# Patient Record
Sex: FEMALE | Race: WHITE | NOT HISPANIC OR LATINO | Employment: OTHER | URBAN - METROPOLITAN AREA
[De-identification: names, ages, dates, MRNs, and addresses within clinical notes are randomized per-mention and may not be internally consistent; named-entity substitution may affect disease eponyms.]

---

## 2022-03-02 ENCOUNTER — APPOINTMENT (OUTPATIENT)
Dept: GENERAL RADIOLOGY | Facility: HOSPITAL | Age: 62
End: 2022-03-02

## 2022-03-02 ENCOUNTER — APPOINTMENT (OUTPATIENT)
Dept: MRI IMAGING | Facility: HOSPITAL | Age: 62
End: 2022-03-02

## 2022-03-02 ENCOUNTER — APPOINTMENT (OUTPATIENT)
Dept: CT IMAGING | Facility: HOSPITAL | Age: 62
End: 2022-03-02

## 2022-03-02 ENCOUNTER — HOSPITAL ENCOUNTER (INPATIENT)
Facility: HOSPITAL | Age: 62
LOS: 6 days | Discharge: NURSING, REHAB OR LONG-TERM CARE | End: 2022-03-08
Attending: EMERGENCY MEDICINE | Admitting: INTERNAL MEDICINE

## 2022-03-02 DIAGNOSIS — R47.01 EXPRESSIVE APHASIA: ICD-10-CM

## 2022-03-02 DIAGNOSIS — I69.392: ICD-10-CM

## 2022-03-02 DIAGNOSIS — I63.9 ACUTE ISCHEMIC STROKE: ICD-10-CM

## 2022-03-02 DIAGNOSIS — R44.9 RIGHT-SIDED SENSORY DEFICIT PRESENT: ICD-10-CM

## 2022-03-02 DIAGNOSIS — R13.12 OROPHARYNGEAL DYSPHAGIA: Primary | ICD-10-CM

## 2022-03-02 PROBLEM — F32.A DEPRESSION: Status: ACTIVE | Noted: 2022-03-02

## 2022-03-02 PROBLEM — G40.909 SEIZURE DISORDER: Chronic | Status: ACTIVE | Noted: 2022-03-02

## 2022-03-02 PROBLEM — E78.5 DYSLIPIDEMIA: Chronic | Status: ACTIVE | Noted: 2022-03-02

## 2022-03-02 PROBLEM — F32.A DEPRESSION: Chronic | Status: ACTIVE | Noted: 2022-03-02

## 2022-03-02 PROBLEM — I10 HYPERTENSION: Status: ACTIVE | Noted: 2022-03-02

## 2022-03-02 PROBLEM — E11.9 TYPE 2 DIABETES MELLITUS: Status: ACTIVE | Noted: 2022-03-02

## 2022-03-02 PROBLEM — E11.9 TYPE 2 DIABETES MELLITUS: Chronic | Status: ACTIVE | Noted: 2022-03-02

## 2022-03-02 PROBLEM — E78.5 DYSLIPIDEMIA: Status: ACTIVE | Noted: 2022-03-02

## 2022-03-02 PROBLEM — I10 HYPERTENSION: Chronic | Status: ACTIVE | Noted: 2022-03-02

## 2022-03-02 PROBLEM — G40.909 SEIZURE DISORDER: Status: ACTIVE | Noted: 2022-03-02

## 2022-03-02 LAB
AMPHET+METHAMPHET UR QL: NEGATIVE
AMPHETAMINES UR QL: NEGATIVE
ANION GAP SERPL CALCULATED.3IONS-SCNC: 14 MMOL/L (ref 5–15)
APTT PPP: 30.7 SECONDS (ref 22–39)
BARBITURATES UR QL SCN: NEGATIVE
BASOPHILS # BLD AUTO: 0.04 10*3/MM3 (ref 0–0.2)
BASOPHILS # BLD AUTO: 0.07 10*3/MM3 (ref 0–0.2)
BASOPHILS NFR BLD AUTO: 0.4 % (ref 0–1.5)
BASOPHILS NFR BLD AUTO: 1.1 % (ref 0–1.5)
BENZODIAZ UR QL SCN: NEGATIVE
BUN SERPL-MCNC: 9 MG/DL (ref 8–23)
BUN/CREAT SERPL: 14.3 (ref 7–25)
BUPRENORPHINE SERPL-MCNC: NEGATIVE NG/ML
CALCIUM SPEC-SCNC: 9.8 MG/DL (ref 8.6–10.5)
CANNABINOIDS SERPL QL: NEGATIVE
CHLORIDE SERPL-SCNC: 98 MMOL/L (ref 98–107)
CO2 SERPL-SCNC: 23 MMOL/L (ref 22–29)
COCAINE UR QL: NEGATIVE
CREAT BLDA-MCNC: 0.5 MG/DL (ref 0.6–1.3)
CREAT SERPL-MCNC: 0.63 MG/DL (ref 0.57–1)
DEPRECATED RDW RBC AUTO: 40.3 FL (ref 37–54)
DEPRECATED RDW RBC AUTO: 40.6 FL (ref 37–54)
EGFRCR SERPLBLD CKD-EPI 2021: 100.4 ML/MIN/1.73
EOSINOPHIL # BLD AUTO: 0.05 10*3/MM3 (ref 0–0.4)
EOSINOPHIL # BLD AUTO: 0.06 10*3/MM3 (ref 0–0.4)
EOSINOPHIL NFR BLD AUTO: 0.6 % (ref 0.3–6.2)
EOSINOPHIL NFR BLD AUTO: 1 % (ref 0.3–6.2)
ERYTHROCYTE [DISTWIDTH] IN BLOOD BY AUTOMATED COUNT: 12 % (ref 12.3–15.4)
ERYTHROCYTE [DISTWIDTH] IN BLOOD BY AUTOMATED COUNT: 12.2 % (ref 12.3–15.4)
GLUCOSE BLDC GLUCOMTR-MCNC: 197 MG/DL (ref 70–130)
GLUCOSE BLDC GLUCOMTR-MCNC: 226 MG/DL (ref 70–130)
GLUCOSE SERPL-MCNC: 249 MG/DL (ref 65–99)
HCT VFR BLD AUTO: 41.1 % (ref 34–46.6)
HCT VFR BLD AUTO: 43.9 % (ref 34–46.6)
HGB BLD-MCNC: 14 G/DL (ref 12–15.9)
HGB BLD-MCNC: 14.9 G/DL (ref 12–15.9)
HOLD SPECIMEN: NORMAL
IMM GRANULOCYTES # BLD AUTO: 0.03 10*3/MM3 (ref 0–0.05)
IMM GRANULOCYTES # BLD AUTO: 0.03 10*3/MM3 (ref 0–0.05)
IMM GRANULOCYTES NFR BLD AUTO: 0.3 % (ref 0–0.5)
IMM GRANULOCYTES NFR BLD AUTO: 0.5 % (ref 0–0.5)
INR PPP: 0.9 (ref 0.8–1.2)
LYMPHOCYTES # BLD AUTO: 1.7 10*3/MM3 (ref 0.7–3.1)
LYMPHOCYTES # BLD AUTO: 1.78 10*3/MM3 (ref 0.7–3.1)
LYMPHOCYTES NFR BLD AUTO: 18.9 % (ref 19.6–45.3)
LYMPHOCYTES NFR BLD AUTO: 28.6 % (ref 19.6–45.3)
MCH RBC QN AUTO: 31 PG (ref 26.6–33)
MCH RBC QN AUTO: 31.2 PG (ref 26.6–33)
MCHC RBC AUTO-ENTMCNC: 33.9 G/DL (ref 31.5–35.7)
MCHC RBC AUTO-ENTMCNC: 34.1 G/DL (ref 31.5–35.7)
MCV RBC AUTO: 90.9 FL (ref 79–97)
MCV RBC AUTO: 92 FL (ref 79–97)
METHADONE UR QL SCN: NEGATIVE
MONOCYTES # BLD AUTO: 0.41 10*3/MM3 (ref 0.1–0.9)
MONOCYTES # BLD AUTO: 0.42 10*3/MM3 (ref 0.1–0.9)
MONOCYTES NFR BLD AUTO: 4.6 % (ref 5–12)
MONOCYTES NFR BLD AUTO: 6.8 % (ref 5–12)
NEUTROPHILS NFR BLD AUTO: 3.86 10*3/MM3 (ref 1.7–7)
NEUTROPHILS NFR BLD AUTO: 6.77 10*3/MM3 (ref 1.7–7)
NEUTROPHILS NFR BLD AUTO: 62 % (ref 42.7–76)
NEUTROPHILS NFR BLD AUTO: 75.2 % (ref 42.7–76)
NRBC BLD AUTO-RTO: 0 /100 WBC (ref 0–0.2)
NRBC BLD AUTO-RTO: 0 /100 WBC (ref 0–0.2)
OPIATES UR QL: NEGATIVE
OXYCODONE UR QL SCN: NEGATIVE
PCP UR QL SCN: NEGATIVE
PLATELET # BLD AUTO: 255 10*3/MM3 (ref 140–450)
PLATELET # BLD AUTO: 262 10*3/MM3 (ref 140–450)
PMV BLD AUTO: 10.1 FL (ref 6–12)
PMV BLD AUTO: 9.9 FL (ref 6–12)
POTASSIUM SERPL-SCNC: 4.2 MMOL/L (ref 3.5–5.2)
PROPOXYPH UR QL: NEGATIVE
PROTHROMBIN TIME: 11.4 SECONDS (ref 12.8–15.2)
RBC # BLD AUTO: 4.52 10*6/MM3 (ref 3.77–5.28)
RBC # BLD AUTO: 4.77 10*6/MM3 (ref 3.77–5.28)
SARS-COV-2 RDRP RESP QL NAA+PROBE: NORMAL
SODIUM SERPL-SCNC: 135 MMOL/L (ref 136–145)
TRICYCLICS UR QL SCN: NEGATIVE
TROPONIN T SERPL-MCNC: <0.01 NG/ML (ref 0–0.03)
WBC NRBC COR # BLD: 6.22 10*3/MM3 (ref 3.4–10.8)
WBC NRBC COR # BLD: 9 10*3/MM3 (ref 3.4–10.8)
WHOLE BLOOD HOLD SPECIMEN: NORMAL
WHOLE BLOOD HOLD SPECIMEN: NORMAL

## 2022-03-02 PROCEDURE — 61645 PERQ ART M-THROMBECT &/NFS: CPT | Performed by: STUDENT IN AN ORGANIZED HEALTH CARE EDUCATION/TRAINING PROGRAM

## 2022-03-02 PROCEDURE — 85610 PROTHROMBIN TIME: CPT

## 2022-03-02 PROCEDURE — 70496 CT ANGIOGRAPHY HEAD: CPT

## 2022-03-02 PROCEDURE — 70450 CT HEAD/BRAIN W/O DYE: CPT

## 2022-03-02 PROCEDURE — 93005 ELECTROCARDIOGRAM TRACING: CPT | Performed by: EMERGENCY MEDICINE

## 2022-03-02 PROCEDURE — 0042T HC CT CEREBRAL PERFUSION W/WO CONTRAST: CPT

## 2022-03-02 PROCEDURE — 25010000002 HEPARIN (PORCINE) 25000-0.45 UT/250ML-% SOLUTION: Performed by: NURSE PRACTITIONER

## 2022-03-02 PROCEDURE — C1773 RET DEV, INSERTABLE: HCPCS | Performed by: STUDENT IN AN ORGANIZED HEALTH CARE EDUCATION/TRAINING PROGRAM

## 2022-03-02 PROCEDURE — 25010000002 MIDAZOLAM PER 1 MG: Performed by: STUDENT IN AN ORGANIZED HEALTH CARE EDUCATION/TRAINING PROGRAM

## 2022-03-02 PROCEDURE — 99284 EMERGENCY DEPT VISIT MOD MDM: CPT

## 2022-03-02 PROCEDURE — 87635 SARS-COV-2 COVID-19 AMP PRB: CPT | Performed by: EMERGENCY MEDICINE

## 2022-03-02 PROCEDURE — C1769 GUIDE WIRE: HCPCS | Performed by: STUDENT IN AN ORGANIZED HEALTH CARE EDUCATION/TRAINING PROGRAM

## 2022-03-02 PROCEDURE — C1887 CATHETER, GUIDING: HCPCS | Performed by: STUDENT IN AN ORGANIZED HEALTH CARE EDUCATION/TRAINING PROGRAM

## 2022-03-02 PROCEDURE — 74018 RADEX ABDOMEN 1 VIEW: CPT

## 2022-03-02 PROCEDURE — 0 IOPAMIDOL PER 1 ML: Performed by: EMERGENCY MEDICINE

## 2022-03-02 PROCEDURE — B31R1ZZ FLUOROSCOPY OF INTRACRANIAL ARTERIES USING LOW OSMOLAR CONTRAST: ICD-10-PCS | Performed by: STUDENT IN AN ORGANIZED HEALTH CARE EDUCATION/TRAINING PROGRAM

## 2022-03-02 PROCEDURE — 82565 ASSAY OF CREATININE: CPT

## 2022-03-02 PROCEDURE — 80048 BASIC METABOLIC PNL TOTAL CA: CPT | Performed by: NURSE PRACTITIONER

## 2022-03-02 PROCEDURE — 0 IODIXANOL PER 1 ML: Performed by: STUDENT IN AN ORGANIZED HEALTH CARE EDUCATION/TRAINING PROGRAM

## 2022-03-02 PROCEDURE — 70551 MRI BRAIN STEM W/O DYE: CPT

## 2022-03-02 PROCEDURE — 84484 ASSAY OF TROPONIN QUANT: CPT | Performed by: EMERGENCY MEDICINE

## 2022-03-02 PROCEDURE — 25010000002 HEPARIN (PORCINE) PER 1000 UNITS: Performed by: STUDENT IN AN ORGANIZED HEALTH CARE EDUCATION/TRAINING PROGRAM

## 2022-03-02 PROCEDURE — 82962 GLUCOSE BLOOD TEST: CPT

## 2022-03-02 PROCEDURE — 85025 COMPLETE CBC W/AUTO DIFF WBC: CPT | Performed by: NURSE PRACTITIONER

## 2022-03-02 PROCEDURE — 99152 MOD SED SAME PHYS/QHP 5/>YRS: CPT | Performed by: STUDENT IN AN ORGANIZED HEALTH CARE EDUCATION/TRAINING PROGRAM

## 2022-03-02 PROCEDURE — 99223 1ST HOSP IP/OBS HIGH 75: CPT | Performed by: NURSE PRACTITIONER

## 2022-03-02 PROCEDURE — 99223 1ST HOSP IP/OBS HIGH 75: CPT | Performed by: INTERNAL MEDICINE

## 2022-03-02 PROCEDURE — 63710000001 INSULIN REGULAR HUMAN PER 5 UNITS: Performed by: INTERNAL MEDICINE

## 2022-03-02 PROCEDURE — C1894 INTRO/SHEATH, NON-LASER: HCPCS | Performed by: STUDENT IN AN ORGANIZED HEALTH CARE EDUCATION/TRAINING PROGRAM

## 2022-03-02 PROCEDURE — 85730 THROMBOPLASTIN TIME PARTIAL: CPT | Performed by: EMERGENCY MEDICINE

## 2022-03-02 PROCEDURE — 85025 COMPLETE CBC W/AUTO DIFF WBC: CPT | Performed by: EMERGENCY MEDICINE

## 2022-03-02 PROCEDURE — 70498 CT ANGIOGRAPHY NECK: CPT

## 2022-03-02 PROCEDURE — 03CG3Z7 EXTIRPATION OF MATTER FROM INTRACRANIAL ARTERY USING STENT RETRIEVER, PERCUTANEOUS APPROACH: ICD-10-PCS | Performed by: STUDENT IN AN ORGANIZED HEALTH CARE EDUCATION/TRAINING PROGRAM

## 2022-03-02 PROCEDURE — C1787 PATIENT PROGR, NEUROSTIM: HCPCS | Performed by: STUDENT IN AN ORGANIZED HEALTH CARE EDUCATION/TRAINING PROGRAM

## 2022-03-02 PROCEDURE — 25010000002 FENTANYL CITRATE (PF) 50 MCG/ML SOLUTION: Performed by: STUDENT IN AN ORGANIZED HEALTH CARE EDUCATION/TRAINING PROGRAM

## 2022-03-02 PROCEDURE — 80306 DRUG TEST PRSMV INSTRMNT: CPT | Performed by: INTERNAL MEDICINE

## 2022-03-02 PROCEDURE — 99153 MOD SED SAME PHYS/QHP EA: CPT | Performed by: STUDENT IN AN ORGANIZED HEALTH CARE EDUCATION/TRAINING PROGRAM

## 2022-03-02 RX ORDER — OXCARBAZEPINE 150 MG/1
300 TABLET, FILM COATED ORAL 2 TIMES DAILY
Status: DISCONTINUED | OUTPATIENT
Start: 2022-03-02 | End: 2022-03-08 | Stop reason: HOSPADM

## 2022-03-02 RX ORDER — OMEPRAZOLE 20 MG/1
20 CAPSULE, DELAYED RELEASE ORAL DAILY
COMMUNITY
End: 2022-03-08 | Stop reason: HOSPADM

## 2022-03-02 RX ORDER — DEXTROSE MONOHYDRATE 25 G/50ML
25 INJECTION, SOLUTION INTRAVENOUS
Status: DISCONTINUED | OUTPATIENT
Start: 2022-03-02 | End: 2022-03-03

## 2022-03-02 RX ORDER — MIDAZOLAM HYDROCHLORIDE 1 MG/ML
INJECTION INTRAMUSCULAR; INTRAVENOUS AS NEEDED
Status: DISCONTINUED | OUTPATIENT
Start: 2022-03-02 | End: 2022-03-02 | Stop reason: HOSPADM

## 2022-03-02 RX ORDER — SODIUM CHLORIDE 0.9 % (FLUSH) 0.9 %
10 SYRINGE (ML) INJECTION EVERY 12 HOURS SCHEDULED
Status: DISCONTINUED | OUTPATIENT
Start: 2022-03-02 | End: 2022-03-08 | Stop reason: HOSPADM

## 2022-03-02 RX ORDER — FAMOTIDINE 20 MG/1
20 TABLET, FILM COATED ORAL 2 TIMES DAILY
COMMUNITY

## 2022-03-02 RX ORDER — SODIUM CHLORIDE 0.9 % (FLUSH) 0.9 %
10 SYRINGE (ML) INJECTION AS NEEDED
Status: DISCONTINUED | OUTPATIENT
Start: 2022-03-02 | End: 2022-03-08 | Stop reason: HOSPADM

## 2022-03-02 RX ORDER — ASPIRIN 300 MG/1
300 SUPPOSITORY RECTAL DAILY
Status: DISCONTINUED | OUTPATIENT
Start: 2022-03-02 | End: 2022-03-04

## 2022-03-02 RX ORDER — EZETIMIBE 10 MG/1
10 TABLET ORAL DAILY
COMMUNITY

## 2022-03-02 RX ORDER — LIDOCAINE HYDROCHLORIDE 10 MG/ML
INJECTION, SOLUTION EPIDURAL; INFILTRATION; INTRACAUDAL; PERINEURAL AS NEEDED
Status: DISCONTINUED | OUTPATIENT
Start: 2022-03-02 | End: 2022-03-02 | Stop reason: HOSPADM

## 2022-03-02 RX ORDER — NICOTINE POLACRILEX 4 MG
15 LOZENGE BUCCAL
Status: DISCONTINUED | OUTPATIENT
Start: 2022-03-02 | End: 2022-03-03

## 2022-03-02 RX ORDER — HEPARIN SODIUM 10000 [USP'U]/100ML
14 INJECTION, SOLUTION INTRAVENOUS
Status: DISCONTINUED | OUTPATIENT
Start: 2022-03-02 | End: 2022-03-03

## 2022-03-02 RX ORDER — FENTANYL CITRATE 50 UG/ML
INJECTION, SOLUTION INTRAMUSCULAR; INTRAVENOUS AS NEEDED
Status: DISCONTINUED | OUTPATIENT
Start: 2022-03-02 | End: 2022-03-02 | Stop reason: HOSPADM

## 2022-03-02 RX ORDER — ACETAMINOPHEN 325 MG/1
650 TABLET ORAL EVERY 6 HOURS PRN
Status: DISCONTINUED | OUTPATIENT
Start: 2022-03-02 | End: 2022-03-08 | Stop reason: HOSPADM

## 2022-03-02 RX ORDER — ATORVASTATIN CALCIUM 40 MG/1
80 TABLET, FILM COATED ORAL NIGHTLY
Status: DISCONTINUED | OUTPATIENT
Start: 2022-03-02 | End: 2022-03-08 | Stop reason: HOSPADM

## 2022-03-02 RX ORDER — IODIXANOL 320 MG/ML
INJECTION, SOLUTION INTRAVASCULAR AS NEEDED
Status: DISCONTINUED | OUTPATIENT
Start: 2022-03-02 | End: 2022-03-02 | Stop reason: HOSPADM

## 2022-03-02 RX ORDER — DULOXETIN HYDROCHLORIDE 60 MG/1
60 CAPSULE, DELAYED RELEASE ORAL DAILY
COMMUNITY

## 2022-03-02 RX ORDER — ATORVASTATIN CALCIUM 20 MG/1
20 TABLET, FILM COATED ORAL DAILY
COMMUNITY
End: 2022-03-08 | Stop reason: HOSPADM

## 2022-03-02 RX ORDER — VERAPAMIL HYDROCHLORIDE 2.5 MG/ML
INJECTION, SOLUTION INTRAVENOUS AS NEEDED
Status: DISCONTINUED | OUTPATIENT
Start: 2022-03-02 | End: 2022-03-02 | Stop reason: HOSPADM

## 2022-03-02 RX ORDER — ASPIRIN 81 MG/1
81 TABLET, CHEWABLE ORAL DAILY
Status: DISCONTINUED | OUTPATIENT
Start: 2022-03-02 | End: 2022-03-04

## 2022-03-02 RX ORDER — OXCARBAZEPINE 300 MG/1
300 TABLET, FILM COATED ORAL 2 TIMES DAILY
COMMUNITY

## 2022-03-02 RX ADMIN — ASPIRIN 300 MG: 300 SUPPOSITORY RECTAL at 18:13

## 2022-03-02 RX ADMIN — IOPAMIDOL 115 ML: 755 INJECTION, SOLUTION INTRAVENOUS at 13:57

## 2022-03-02 RX ADMIN — Medication 10 ML: at 20:35

## 2022-03-02 RX ADMIN — ATORVASTATIN CALCIUM 80 MG: 40 TABLET, FILM COATED ORAL at 23:52

## 2022-03-02 RX ADMIN — INSULIN HUMAN 3 UNITS: 100 INJECTION, SOLUTION PARENTERAL at 18:24

## 2022-03-02 RX ADMIN — ACETAMINOPHEN 650 MG: 325 TABLET, FILM COATED ORAL at 23:53

## 2022-03-02 RX ADMIN — HEPARIN SODIUM 12 UNITS/KG/HR: 10000 INJECTION, SOLUTION INTRAVENOUS at 18:13

## 2022-03-02 RX ADMIN — NICARDIPINE HYDROCHLORIDE 10 MG/HR: 25 INJECTION, SOLUTION INTRAVENOUS at 17:10

## 2022-03-02 NOTE — H&P
"      Chief complaint:  Aphasia and right facial numbness     Subjective     Maryana Luevano is a 61 y.o. female who presents to [facility] ED on 3/2/22 with complaints of ongoing speech difficulty and right facial numbness concerning for stroke.      The patient is unable to participate in a thorough medical history based upon her expressive aphasia.  Her past medical history includes HTN, dyslipidemia, T2DM, and possible seizures (Trileptal) and depression (Cymbalta). Yesterday the patient was evaluated by [facility] for evaluation of episodic aphasia. Evidently some form of cerebral imaging was obtained with results unknown though may have represented a \"remote left frontal parietal infarct\" based upon outside records obtained by the stroke team. Today the patient was brought to the ED with ongoing severe expressive aphasia, right-sided vision loss, and right facial numbness. She presented with NIH 8. CT head negative for acute process with prior left parietal lobes infarcts. CTP showed a perfusion deficit within the left temporal and left parietal lobes. CTA head/neck reveals a left M2 occlusion. EKG NSR and -200s.  Patient was evaluated by our stroke neurology services and deemed not a candidate for thrombolytic therapy as she was outside the timeline window, therefore she was taken to the cath lab for mechanical thrombectomy by Dr. Nelson.     She presents to the ICU post thrombectomy.  NIH currently scored as an 11.  She has expressive aphasia but does follow verbal commands quite readily.  She has no clear focal deficits from a motor standpoint other than the left hand but she still has the TR band in place after catheterization.     COVID-19 testing on 3/2 negative. She has completed the Moderna vaccine series in May 2021    History  Past Medical History:   Diagnosis Date   • Depression 3/2/2022   • Dyslipidemia 3/2/2022   • Hypertension 3/2/2022   • Seizure disorder (on Trileptal) 3/2/2022 " "  • T2DM  3/2/2022     No past surgical history on file.  History reviewed. No pertinent family history.  Social History     Tobacco Use   • Smoking status: Never Smoker   • Smokeless tobacco: Never Used   Substance Use Topics   • Alcohol use: Not on file   • Drug use: Not on file     E-cigarette/Vaping     E-cigarette/Vaping Substances     Medications Prior to Admission   Medication Sig Dispense Refill Last Dose   • DULoxetine (CYMBALTA) 60 MG capsule Take 60 mg by mouth Daily.   3/2/2022 at Unknown time   • metFORMIN (GLUCOPHAGE) 500 MG tablet Take 500 mg by mouth 2 (Two) Times a Day With Meals.   3/2/2022 at Unknown time   • OXcarbazepine (TRILEPTAL) 300 MG tablet Take 300 mg by mouth 2 (Two) Times a Day.   3/2/2022 at Unknown time   • atorvastatin (LIPITOR) 20 MG tablet Take 20 mg by mouth Daily. (Patient not taking: Reported on 3/2/2022)   Not Taking at Unknown time   • ezetimibe (Zetia) 10 MG tablet Take 10 mg by mouth Daily. (Patient not taking: Reported on 3/2/2022)   Not Taking at Unknown time   • famotidine (PEPCID) 20 MG tablet Take 20 mg by mouth 2 (Two) Times a Day. (Patient not taking: Reported on 3/2/2022)   Not Taking at Unknown time   • omeprazole (priLOSEC) 20 MG capsule Take 20 mg by mouth Daily. (Patient not taking: Reported on 3/2/2022)   Not Taking at Unknown time     Allergies:  Patient has no known allergies.    Review of Systems   Review of systems could not be obtained due to   patient nonverbal.      Objective     Vital Signs  Temp:  [97.7 °F (36.5 °C)-98 °F (36.7 °C)] 98 °F (36.7 °C)  Heart Rate:  [] 87  Resp:  [16-18] 16  BP: (117-201)/() 121/70    Physical Exam:    Objective:  General Appearance:  In no acute distress.    Vital signs: (most recent): Blood pressure 121/70, pulse 87, temperature 98 °F (36.7 °C), temperature source Oral, resp. rate 16, height 167.6 cm (66\"), weight 77.1 kg (170 lb), SpO2 95 %.    HEENT: Normal HEENT exam.    Lungs:  Normal effort and normal " respiratory rate.  Breath sounds clear to auscultation.  She is not in respiratory distress.  No rales, wheezes or rhonchi.    Heart: Normal rate.  Regular rhythm.  S1 normal and S2 normal.  No murmur, gallop or friction rub.   Chest: Symmetric chest wall expansion.   Abdomen: Abdomen is soft and non-distended.  Bowel sounds are normal.   There is no abdominal tenderness.   There is no mass. There is no splenomegaly. There is no hepatomegaly.   Extremities: There is no deformity or dependent edema.    Neurological: Patient is alert and oriented to person, place and time.    Pupils:  Pupils are equal, round, and reactive to light.    Skin:  Warm and dry.             1a. Level of Consciousness: 0-->Alert, keenly responsive  1b. LOC Questions: 2-->Answers neither question correctly  1c. LOC Commands: 0-->Performs both tasks correctly  2. Best Gaze: 0-->Normal  3. Visual: 1-->Partial hemianopia  4. Facial Palsy: 1-->Minor paralysis (flattened nasolabial fold, asymmetry on smiling)  5a. Motor Arm, Left: 1-->Drift, limb holds 90 (or 45) degrees, but drifts down before full 10 seconds, does not hit bed or other support  5b. Motor Arm, Right: 0-->No drift, limb holds 90 (or 45) degrees for full 10 secs  6a. Motor Leg, Left: 0-->No drift, leg holds 30 degree position for full 5 secs  6b. Motor Leg, Right: 0-->No drift, leg holds 30 degree position for full 5 secs  7. Limb Ataxia: 0-->Absent  8. Sensory: 1-->Mild-to-moderate sensory loss, patient feels pinprick is less sharp or is dull on the affected side, or there is a loss of superficial pain with pinprick, but patient is aware of being touched  9. Best Language: 3-->Mute, global aphasia, no usable speech or auditory comprehension  10. Dysarthria: 2-->Severe dysarthria, patients speech is so slurred as to be unintelligible in the absence of or out of proportion to any dysphasia, or is mute/anarthric  11. Extinction and Inattention (formerly Neglect): 0-->No  abnormality    Total (NIH Stroke Scale): 11      Results Review:    I reviewed the patient's new clinical results.  I reviewed the patient's new imaging results and agree with the interpretation.  I personally viewed and interpreted the patient's EKG/Telemetry data    Assessment/Plan     Assessment:    Active Hospital Problems    Diagnosis    • **Acute ischemic stroke     • Hypertension    • Dyslipidemia    • T2DM     • Seizure disorder (on Trileptal)    • Depression        61-year-old female with a past medical history of hypertension, dyslipidemia, type 2 diabetes mellitus, and seizures.  She now presents with a left MCA territory stroke due to an M2 inferior division occlusion and has been taken for thrombectomy by Dr. Chang.  Plans are for a heparin drip as well as aspirin and a high-dose statin in addition to below.    Plan:    1. Neuro ICU admission  2. Heparin drip as head CT stable post procedure  3. Aspirin 81 mg  4. High-dose statin  5. Echocardiogram  6. Lipid panel and A1c  7. Follow-up head CT in a.m.  8. MRI brain  9. PT/OT/SLP  10. Goal systolic blood pressure less than 140 mmHg  11. Continue Trileptal    I discussed the patients findings and my recommendations with patient, nursing staff and niece.     Level of Risk High due to:  illness with threat to life or bodily function and abrupt change in neurological status     I have seen and examined patient, performing a face-to-face diagnostic evaluation with plan of care reviewed and developed with APRN and nursing staff. I have addended and modified the above history of present illness, physical examination, and assessment and plan to reflect my findings and impressions.    Jonnathan Loo MD  Pulmonary and Critical Care Medicine        Electronically signed by JODY Oliver, 03/02/22, 5:10 PM EST.

## 2022-03-02 NOTE — PROGRESS NOTES
HEPARIN INFUSION  Maryana Luevano is a  61 y.o. female receiving heparin infusion.             Therapy for (VTE/Cardiac):   cardiac  Patient Weight: 77.1kg  Initial Bolus (Y/N):   no  Any Bolus (Y/N):   no        Signs or Symptoms of Bleeding: new start       Cardiac or Other (Not VTE)   Initial Bolus: 60 units/kg (Max 4,000 units)  Initial rate: 12 units/kg/hr (Max 1,000 units/hr)   Anti-Xa (IU/mL) Bolus Dose Stop Infusion Rate Change Repeat Anti-Xa      ?0.19 60 units/kg 0 hrs Increase rate by   4 units/kg/hr 6 hrs       0.2 - 0.29  30 units/kg 0 hrs Increase rate by 2 units/kg/hr 6 hrs    0.3 - 0.7 0 0 hrs No change 6 hrs      0.71 - 0.99 0  0 hrs Decrease rate by 2 units/kg/hr 6 hrs            ?1 0 Hold 1 hr Decrease rate by 3 units/kg/hr 6 hrs      Recommend Xa every 6 hours.           Results from last 7 days   Lab Units 03/02/22  1338 03/02/22  1337   INR  0.9  --    HEMOGLOBIN g/dL  --  14.9   HEMATOCRIT %  --  43.9   PLATELETS 10*3/mm3  --  255          Date   Time   Anti-Xa Current Rate (Unit/kg/hr) Bolus   (Units) Rate Change   (Unit/kg/hr) New Rate (Unit/kg/hr) Next   Anti-Xa Comments  Pump Check Daily   3/2 1743 pending -- -- +12 12 0000 HEATHER Kang, PharmD  3/2/2022  17:43 EST

## 2022-03-02 NOTE — CONSULTS
"Stroke Consult Note    Patient Name: Maryana Luevano   MRN: [id number]  Age: 61 y.o.  Sex: female  : 1960    Primary Care Physician: Son Choi DO  Referring Physician:  No ref. provider found    TIME STROKE TEAM CALLED: 1335 EST     TIME PATIENT SEEN: 1340 EST    Handedness: right   Race:       Chief Complaint/Reason for Consultation: expressive aphasia, right facial droop, right visual field loss, right side sensory loss     HPI:   Maryana Luevano is a 61-year-old  female with no confirmed medical diagnoses but on review of her medication bag appears she has seizures, hypertension, hyperlipidemia and type 2 diabetes.  She presents to [facility] ED via private vehicle accompanied by her niece with concerns for severe expressive aphasia, right sided visual loss and diminished sensation to the right cheek and leg that we believe began sometime between 5 and 7 PM yesterday evening.  Per report of the knees patient lives at home with her 2 grandchildren who she cares for and they noted that she was having difficulty speaking sometime after fixing dinner.  The actual time is not known.  She was taken to a local facility in [facility] where she was evaluated and received a CT scan of her head.  No records or discs were given the patient but based on document scanned to our facility it indicated a \"remote left frontal parietal infarct\".  She was discharged yesterday evening and told to follow-up with speech-language pathology on an outpatient basis.  Niitzel said this morning when she saw how bad her speech was she decided to bring her to [place] herself.  Patient does not take any anticoagulation or antiplatelets.  She nods her head no when asked if she has a history of TIA or stroke but does indicate she has a seizure disorder.    On arrival to our facility she was taken urgently for advanced imaging.  CT head without contrast revealed a age-indeterminate infarct of the left parietal with no " hemorrhage.  CTA head and neck indicate that she has a left M2 inferior division occlusion.  CT cerebral perfusion shows approximately 20 mL of ischemic tissue at risk with no core.  Discussed these images with Dr. Nelson who is the on-call neuro interventional list and it was determined that she would be taken to [facility] to attempt a mechanical thrombectomy.  Reviewed all this information with patient and her niece including risks and benefits and they both were in agreement that they would like to pursue.  At baseline patient has an MRS by 0 and is highly functioning.  She is the primary caregiver to 2 grandchildren at this time.  Unfortunately due to last known well being greater than 4.5 hours prior to presentation TNK was not a treatment option for this patient.  She will be taken to the neuro ICU after her procedure for close monitoring.    Last Known Normal Date/Time: between 8901-5234 EST 3/1/2022     Review of Systems   Unable to perform ROS: Patient nonverbal        Temp:  [97.7 °F (36.5 °C)] 97.7 °F (36.5 °C)  Heart Rate:  [] 106  Resp:  [16-18] 16  BP: (148-201)/() 201/119    Neurological Exam  Mental Status  Awake. Oriented only to person. Orientation: Can answer most of my yes/no questions and point . Severe dysarthria present. Mixed aphasia present. Language: Expressive greater than receptive. She seems to follow most commands . Attention and concentration are normal. Fund of knowledge is appropriate for level of education.    Cranial Nerves  CN II: Visual acuity is normal. Visual fields full to confrontation.  CN III, IV, VI: Extraocular movements intact bilaterally. Normal lids and orbits bilaterally. Pupils equal round and reactive to light bilaterally.  CN V: Facial sensation is normal.  CN VII: Full and symmetric facial movement.  CN VIII: Hearing is normal to speech .  CN XI: Shoulder shrug strength is normal.  CN XII: Tongue midline without atrophy or  fasciculations.    Motor  Normal muscle bulk throughout. No fasciculations present. Normal muscle tone. Strength is 5/5 throughout all four extremities.    Sensory  Light touch is normal in upper and lower extremities. Pinprick is normal in upper and lower extremities.     Reflexes                                           Right                      Left  Plantar                           Downgoing                Downgoing    Coordination  Finger-to-nose, rapid alternating movements and heel-to-shin normal bilaterally without dysmetria.  No obvious dysmetria .    Gait  Casual gait is normal including stance, stride, and arm swing.      Physical Exam  Constitutional:       General: She is awake.   Eyes:      General: Lids are normal.      Extraocular Movements: Extraocular movements intact.      Pupils: Pupils are equal, round, and reactive to light.   Neurological:      Cranial Nerves: Dysarthria present.      Coordination: Coordination is intact.      Deep Tendon Reflexes: Strength normal.         Acute Stroke Data    TNK Inclusion / Exclusion Criteria    Time: 15:07 EST  Person Administering Scale: JODY Rodriguez    Inclusion Criteria  []   18 years of age or greater   []   Onset of symptoms < 4.5 hours before beginning treatment (stroke onset = time patient was last seen well or without symptoms).   []   Diagnosis of acute ischemic stroke causing measurable disabling deficit (Complete Hemianopia, Any Aphasia, Visual or Sensory Extinction, Any weakness limiting sustained effort against gravity)   []   Any remaining deficit considered potentially disabling in view of patient and practitioner   Exclusion criteria (Do not proceed with Alteplase if any are checked under exclusion criteria)  [x]   Onset unknown or GREATER than 4.5 hours   []   ICH on CT/MRI   []   CT demonstrates hypodensity representing acute or subacute infarct   []   Significant head trauma or prior stroke in the previous 3 months   []    Symptoms suggestive of subarachnoid hemorrhage   []   History of un-ruptured intracranial aneurysm GREATER than 10 mm   []   Recent intracranial or intraspinal surgery within the last 3 months   []   Arterial puncture at a non-compressible site in the previous 7 days   []   Active internal bleeding   []   Acute bleeding tendency   []   Platelet count LESS than 100,000 for known hematological diseases such as leukemia, thrombocytopenia or chronic cirrhosis   []   Current use of anticoagulant with INR GREATER than 1.7 or PT GREATER than 15 seconds, aPTT GREATER than 40 seconds   []   Heparin received within 48 hours, resulting in abnormally elevated aPTT GREATER than upper limit of normal   []   Current use of direct thrombin inhibitors or direct factor Xa inhibitors in the past 48 hours   []   Elevated blood pressure refractory to treatment (systolic GREATER than 185 mm/Hg or diastolic  GREATER than 110 mm/Hg   []   Suspected infective endocarditis and aortic arch dissection   []   Current use of therapeutic treatment dose of low-molecular-weight heparin (LMWH) within the previous 24 hours   []   Structural GI malignancy or bleed   Relative exclusion for all patients  []   Only minor non-disabling symptoms   []   Pregnancy   []   Seizure at onset with postictal residual neurological impairments   []   Major surgery or previous trauma within past 14 days   []   History of previous spontaneous ICH, intracranial neoplasm, or AV malformation   []   Postpartum (within previous 14 days)   []   Recent GI or urinary tract hemorrhage (within previous 21 days)   []   Recent acute MI (within previous 3 months)   []   History of un-ruptured intracranial aneurysm LESS than 10 mm   []   History of ruptured intracranial aneurysm   []   Blood glucose LESS than 50 mg/dL (2.7 mmol/L)   []   Dural puncture within the last 7 days   []   Known GREATER than 10 cerebral microbleeds   Additional exclusions for patients with symptoms onset  between 3 and 4.5 hours.  []   Age > 80.   []   On any anticoagulants regardless of INR  >>> Warfarin (Coumadin), Heparin, Enoxaparin (Lovenox), fondaparinux (Arixtra), bivalirudin (Angiomax), Argatroban, dabigatran (Pradaxa), rivaroxaban (Xarelto), or apixaban (Eliquis)   []   Severe stroke (NIHSS > 25).   []   History of BOTH diabetes and previous ischemic stroke.   []   The risks and benefits have been discussed with the patient or family related to the administration of IV Alteplase for stroke symptoms.   []   I have discussed and reviewed the patient's case and imaging with the attending prior to IV Alteplase.   Not given  Time Alteplase administered       No past medical history on file.  No past surgical history on file.  No family history on file.  Social History     Socioeconomic History   • Marital status:      No Known Allergies  Prior to Admission medications    Medication Sig Start Date End Date Taking? Authorizing Provider   atorvastatin (LIPITOR) 20 MG tablet Take 20 mg by mouth Daily.   Yes Marjorie Tan MD   DULoxetine (CYMBALTA) 60 MG capsule Take 60 mg by mouth Daily.   Yes Marjorie Tan MD   ezetimibe (Zetia) 10 MG tablet Take 10 mg by mouth Daily.   Yes Marjorie Tan MD   famotidine (PEPCID) 20 MG tablet Take 20 mg by mouth 2 (Two) Times a Day.   Yes Marjorie Tan MD   metFORMIN (GLUCOPHAGE) 500 MG tablet Take 500 mg by mouth 2 (Two) Times a Day With Meals.   Yes Marjorie Tan MD   omeprazole (priLOSEC) 20 MG capsule Take 20 mg by mouth Daily.   Yes Marjorie Tan MD   OXcarbazepine (TRILEPTAL) 300 MG tablet Take 300 mg by mouth 2 (Two) Times a Day.   Yes Marjorie Tan MD       Hospital Meds:  Scheduled-    Infusions-     PRNs- •  fentaNYL citrate (PF)  •  lidocaine PF 1%  •  midazolam  •  niCARdipine + nitroglycerin + heparin radial artery injection  •  O2  •  [MAR Hold] sodium chloride    Functional Status Prior to Current  Stroke/Kia Score: 0    NIH Stroke Scale  Time: 15:07 EST  Person Administering Scale: JODY Rodriguez       1a. Level of Consciousness: 0-->Alert, keenly responsive  1b. LOC Questions: 2-->Answers neither question correctly  1c. LOC Commands: 0-->Performs both tasks correctly  2. Best Gaze: 0-->Normal  3. Visual: 1-->Partial hemianopia  4. Facial Palsy: 1-->Minor paralysis (flattened nasolabial fold, asymmetry on smiling)  5a. Motor Arm, Left: 0-->No drift, limb holds 90 (or 45) degrees for full 10 secs  5b. Motor Arm, Right: 0-->No drift, limb holds 90 (or 45) degrees for full 10 secs  6a. Motor Leg, Left: 0-->No drift, leg holds 30 degree position for full 5 secs  6b. Motor Leg, Right: 0-->No drift, leg holds 30 degree position for full 5 secs  7. Limb Ataxia: 0-->Absent  8. Sensory: 1-->Mild-to-moderate sensory loss, patient feels pinprick is less sharp or is dull on the affected side, or there is a loss of superficial pain with pinprick, but patient is aware of being touched  9. Best Language: 3-->Mute, global aphasia, no usable speech or auditory comprehension  10. Dysarthria: 2-->Severe dysarthria, patients speech is so slurred as to be unintelligible in the absence of or out of proportion to any dysphasia, or is mute/anarthric  11. Extinction and Inattention (formerly Neglect): 0-->No abnormality    Total (NIH Stroke Scale): 10      Results Reviewed:  I have personally reviewed current lab, radiology, and data and agree with results.  Lab Results (last 24 hours)     Procedure Component Value Units Date/Time    COVID PRE-OP / PRE-PROCEDURE SCREENING ORDER (NO ISOLATION) - Swab, Nasopharynx [[id number]]  (Normal) Collected: 03/02/22 1434    Specimen: Swab from Nasopharynx Updated: 03/02/22 1456    Narrative:      The following orders were created for panel order COVID PRE-OP / PRE-PROCEDURE SCREENING ORDER (NO ISOLATION) - Swab, Nasopharynx.  Procedure                               Abnormality          Status                     ---------                               -----------         ------                     COVID-19, ABBOTT IN-HOUS...[[id number]]  Normal              Final result                 Please view results for these tests on the individual orders.    COVID-19, ABBOTT IN-HOUSE,NASAL Swab (NO TRANSPORT MEDIA) 2 HR TAT - Swab, Nasopharynx [[id number]]  (Normal) Collected: 03/02/22 1434    Specimen: Swab from Nasopharynx Updated: 03/02/22 1456     COVID19 Presumptive Negative    Narrative:      Fact sheet for providers: https://www.fda.gov/media/778244/download     Fact sheet for patients: https://www.fda.gov/media/613646/download    Test performed by PCR.  If inconsistent with clinical signs and symptoms patient should be tested with different authorized molecular test.    [place] Draw [[id number]] Collected: 03/02/22 1337    Specimen: Blood Updated: 03/02/22 1445    Narrative:      The following orders were created for panel order [place] Draw.  Procedure                               Abnormality         Status                     ---------                               -----------         ------                     Green Top (Gel)[[id number]]                                  Final result               Lavender Top[[id number]]                                     Final result               Gold Top - SST[[id number]]                                   Final result               Valdez Top[[id number]]                                         In process                 Light Blue Top[[id number]]                                   Final result                 Please view results for these tests on the individual orders.    Green Top (Gel) [[id number]] Collected: 03/02/22 1337    Specimen: Blood Updated: 03/02/22 1445     Extra Tube Hold for add-ons.     Comment: Auto resulted.       Lavender Top [[id number]] Collected: 03/02/22 1337    Specimen: Blood Updated: 03/02/22 1445     Extra Tube hold for add-on     Comment:  Auto resulted       Gold Top - SST [[id number]] Collected: 03/02/22 1337    Specimen: Blood Updated: 03/02/22 1445     Extra Tube Hold for add-ons.     Comment: Auto resulted.       Light Blue Top [[id number]] Collected: 03/02/22 1337    Specimen: Blood Updated: 03/02/22 1445     Extra Tube hold for add-on     Comment: Auto resulted       Troponin [[id number]]  (Normal) Collected: 03/02/22 1337    Specimen: Blood Updated: 03/02/22 1405     Troponin T <0.010 ng/mL     Narrative:      Troponin T Reference Range:  <= 0.03 ng/mL-   Negative for AMI  >0.03 ng/mL-     Abnormal for myocardial necrosis.  Clinicians would have to utilize clinical acumen, EKG, Troponin and serial changes to determine if it is an Acute Myocardial Infarction or myocardial injury due to an underlying chronic condition.       Results may be falsely decreased if patient taking Biotin.      aPTT [[id number]]  (Normal) Collected: 03/02/22 1337    Specimen: Blood Updated: 03/02/22 1404     PTT 30.7 seconds     Narrative:      PTT = The equivalent PTT values for the therapeutic range of heparin levels at 0.3 to 0.5 U/ml are 55 to 70 seconds.    POC Creatinine [[id number]]  (Abnormal) Collected: 03/02/22 1339    Specimen: Blood Updated: 03/02/22 1359     Creatinine 0.50 mg/dL      Comment: Serial Number: [id number]:  [id number]       CBC & Differential [[id number]]  (Abnormal) Collected: 03/02/22 1337    Specimen: Blood Updated: 03/02/22 1348    Narrative:      The following orders were created for panel order CBC & Differential.  Procedure                               Abnormality         Status                     ---------                               -----------         ------                     CBC Auto Differential[[id number]]        Abnormal            Final result                 Please view results for these tests on the individual orders.    CBC Auto Differential [[id number]]  (Abnormal) Collected: 03/02/22 1337    Specimen: Blood Updated: 03/02/22  1348     WBC 6.22 10*3/mm3      RBC 4.77 10*6/mm3      Hemoglobin 14.9 g/dL      Hematocrit 43.9 %      MCV 92.0 fL      MCH 31.2 pg      MCHC 33.9 g/dL      RDW 12.0 %      RDW-SD 40.3 fl      MPV 10.1 fL      Platelets 255 10*3/mm3      Neutrophil % 62.0 %      Lymphocyte % 28.6 %      Monocyte % 6.8 %      Eosinophil % 1.0 %      Basophil % 1.1 %      Immature Grans % 0.5 %      Neutrophils, Absolute 3.86 10*3/mm3      Lymphocytes, Absolute 1.78 10*3/mm3      Monocytes, Absolute 0.42 10*3/mm3      Eosinophils, Absolute 0.06 10*3/mm3      Basophils, Absolute 0.07 10*3/mm3      Immature Grans, Absolute 0.03 10*3/mm3      nRBC 0.0 /100 WBC     POC Protime / INR [[id number]]  (Abnormal) Collected: 03/02/22 1338    Specimen: Blood Updated: 03/02/22 1344     Protime 11.4 seconds      INR 0.9     Comment: Serial Number: [id number]:  [id number]       Valdez Top [[id number]] Collected: 03/02/22 1337    Specimen: Blood Updated: 03/02/22 1342        Imaging Results (Last 24 Hours)     Procedure Component Value Units Date/Time    XR Chest 1 View [[id number]] Resulted: 03/02/22 1338     Updated: 03/02/22 1440    CT Angiogram Head w AI Analysis of LVO [[id number]] Collected: 03/02/22 1416     Updated: 03/02/22 1428    Narrative:         DATE OF EXAM:  3/2/2022 1:43 PM     PROCEDURE:  CT ANGIOGRAM NECK-, CT ANGIOGRAM HEAD W AI ANALYSIS OF LVO-     INDICATIONS:   Stroke, follow up     COMPARISON:   No Comparisons Available     TECHNIQUE:  CTA of the head and CTA of the neck was performed after the intravenous  administration of 115 mL Isovue 370. Reconstructed coronal and sagittal  images were also obtained. In addition, a 3 D volume rendered image was  obtained after post processing. Automated exposure control and iterative  reconstruction methods were used. AI analysis of LVO was utilized for  the CTA Head imaging portion of the study.      FINDINGS:     CTA neck:  Aortic arch appears within normal limits.  Minimal calcified plaque  is  seen.  The right brachycephalic artery is patent without focal stenosis.   Visualized portions of the right subclavian artery are also patent  without focal stenosis.  The right common carotid artery is patent without focal stenosis.  No  significant plaque identified.  There is no significant plaque at the origin of the right internal  carotid artery.  No significant stenosis identified.  The remaining  portions of the right internal and external carotid arteries are patent  without focal stenosis.     The left common carotid artery is patent without focal stenosis.  Left  common carotid arises from common trunk with the brachiocephalic artery.   No significant plaque identified.  There is calcified plaque of proximal left internal carotid artery.  No  significant stenosis.  The remaining portions of the left interna and  external carotid arteries appear patent without focal stenosis.     The visualized portions of the left clavian artery are patent without  focal stenosis.     The vertebral arteries are patent bilaterally.  No focal stenosis  identified.        Soft tissues of the neck appear within normal limits.  No cervical  lymphadenopathy.        CTA head:  Intracranial portions of the internal carotid arteries are patent  without focal stenosis.  The left anterior cerebral artery is patent without focal stenosis.   There is complete occlusion of the inferior division of the left M2  segment of the middle cerebral artery.  M1 segment of the left middle  cerebral artery appears patent without focal stenosis.  The right  anterior cerebral and middle cerebral arteries appear patent without  focal stenosis.  There is fetal-type origin of the right posterior  cerebral artery seen as anatomic variant.  There is hypoplasia of the P1  segment of the left posterior cerebral artery with a prominent left  posterior communicating artery.  Posterior cerebral arteries are  otherwise patent without focal stenosis.  The  right vertebral artery appears to terminate in a PICA seen as  anatomic variant.  Left vertebral artery is patent to the basilar.   Basilar artery is patent without focal stenosis.       No pathologic intracranial contrast enhancement identified.                      Impression:         1.  Occlusion of the inferior division of the M2 segment of the left  middle cerebral artery.  2.  No other intracranial mass or stenosis or occlusion.  3.  No evidence of carotid or vertebral artery stenosis.  Findings personally called to [place] of the stroke team at 2:16 PM on  3/2/2022     This report was finalized on 3/2/2022 2:25 PM by Vignesh Scott MD.       CT Angiogram Neck [[id number]] Collected: 03/02/22 1416     Updated: 03/02/22 1428    Narrative:         DATE OF EXAM:  3/2/2022 1:43 PM     PROCEDURE:  CT ANGIOGRAM NECK-, CT ANGIOGRAM HEAD W AI ANALYSIS OF LVO-     INDICATIONS:   Stroke, follow up     COMPARISON:   No Comparisons Available     TECHNIQUE:  CTA of the head and CTA of the neck was performed after the intravenous  administration of 115 mL Isovue 370. Reconstructed coronal and sagittal  images were also obtained. In addition, a 3 D volume rendered image was  obtained after post processing. Automated exposure control and iterative  reconstruction methods were used. AI analysis of LVO was utilized for  the CTA Head imaging portion of the study.      FINDINGS:     CTA neck:  Aortic arch appears within normal limits.  Minimal calcified plaque is  seen.  The right brachycephalic artery is patent without focal stenosis.   Visualized portions of the right subclavian artery are also patent  without focal stenosis.  The right common carotid artery is patent without focal stenosis.  No  significant plaque identified.  There is no significant plaque at the origin of the right internal  carotid artery.  No significant stenosis identified.  The remaining  portions of the right internal and external carotid arteries are  patent  without focal stenosis.     The left common carotid artery is patent without focal stenosis.  Left  common carotid arises from common trunk with the brachiocephalic artery.   No significant plaque identified.  There is calcified plaque of proximal left internal carotid artery.  No  significant stenosis.  The remaining portions of the left interna and  external carotid arteries appear patent without focal stenosis.     The visualized portions of the left clavian artery are patent without  focal stenosis.     The vertebral arteries are patent bilaterally.  No focal stenosis  identified.        Soft tissues of the neck appear within normal limits.  No cervical  lymphadenopathy.        CTA head:  Intracranial portions of the internal carotid arteries are patent  without focal stenosis.  The left anterior cerebral artery is patent without focal stenosis.   There is complete occlusion of the inferior division of the left M2  segment of the middle cerebral artery.  M1 segment of the left middle  cerebral artery appears patent without focal stenosis.  The right  anterior cerebral and middle cerebral arteries appear patent without  focal stenosis.  There is fetal-type origin of the right posterior  cerebral artery seen as anatomic variant.  There is hypoplasia of the P1  segment of the left posterior cerebral artery with a prominent left  posterior communicating artery.  Posterior cerebral arteries are  otherwise patent without focal stenosis.  The right vertebral artery appears to terminate in a PICA seen as  anatomic variant.  Left vertebral artery is patent to the basilar.   Basilar artery is patent without focal stenosis.       No pathologic intracranial contrast enhancement identified.                      Impression:         1.  Occlusion of the inferior division of the M2 segment of the left  middle cerebral artery.  2.  No other intracranial mass or stenosis or occlusion.  3.  No evidence of carotid or  vertebral artery stenosis.  Findings personally called to [place] of the stroke team at 2:16 PM on  3/2/2022     This report was finalized on 3/2/2022 2:25 PM by Vignesh Scott MD.       CT CEREBRAL PERFUSION WITH & WITHOUT CONTRAST [[id number]] Collected: 03/02/22 1412     Updated: 03/02/22 1417    Narrative:      CT CEREBRAL PERFUSION W WO CONTRAST-     Date of Exam: 3/2/2022 1:43 PM     Indication: Neuro deficit, acute, stroke suspected.     Comparison Exams: None available.        TECHNIQUE:  Axial images through the head without contrast. CT perfusion  images through the brain. 100 mL Isovue 370 given IV. Low-dose CT  acquisition technique included one of the following options: 1.  Automated exposure control, 2. Adjustment of mA   and/or KV according to patient's size and/or 3. Use of iterative  reconstruction.           CT PERFUSION BRAIN:     There is increased mean transit time and time to peak involving the  posterior left temporal lobe and portions of the left parietal lobe.     CBF<30% volume: 0 mL  Tmax>6sec volume: 19 mL  Mismatch volume: 19 mL  Mismatch ratio: Infant                Impression:            1.  Abnormal increased mean transit time and time to peak involving the  left temporal and left parietal lobes compatible with ischemia.  No  blood flow less than 30% to suggest core infarct at this time.  2.  Findings communicated to Sobia of this stroke team at 2:10 PM on  3/2/2022     This report was finalized on 3/2/2022 2:14 PM by Vignesh Scott MD.       CT Head Without Contrast Stroke Protocol [[id number]] Collected: 03/02/22 1353     Updated: 03/02/22 1401    Narrative:      CT HEAD WO CONTRAST STROKE PROTOCOL-     Date of Exam: 3/2/2022 1:42 PM     Indication: Neuro deficit, acute, stroke suspected.     Comparison Exams: None available.     Technique: Multiple axial images were obtained from the skull base to  the vertex without the administration of IV contrast. The axial data was  used to  generate reformatted images in the coronal and sagittal planes.  Automated exposure control and iterative reconstruction methods were  used.     FINDINGS:  There is no evidence of acute intracranial hemorrhage.  There are patchy  areas of low-attenuation involving the left parietal lobe which are  age-indeterminate.  There appears to be a smaller area of  encephalomalacia within the posterior left parietal lobe suggesting old  infarct.  No abnormal extra-axial fluid collections are seen.   There is  no mass effect or hydrocephalus.     There is no evidence of skull fracture.   Visualized paranasal sinuses  and mastoid air cells are clear.      The globes and orbits are within normal limits.       Impression:            1.  No evidence of intracranial hemorrhage.  2.  Patchy areas of low-attenuation within the left parietal lobe cortex  compatible with age-indeterminate infarct.  3.  Small area of encephalomalacia involving the posterior left parietal  lobe compatible with old infarct.     4.  Findings personally communicated to the stroke navigator at 1:47 PM  on 3/2/2022        This report was finalized on 3/2/2022 1:56 PM by Vignesh Scott MD.               Assessment/Plan:  This is a 61-year-old  female with no confirmed medical diagnoses but on review of her medication bag appears she has seizures, hypertension, hyperlipidemia and type 2 diabetes.  She presents to [facility] ED via private vehicle accompanied by her niece with concerns for severe expressive aphasia, right sided visual loss and diminished sensation to the right cheek and leg that we believe began sometime between 5 and 7 PM yesterday evening. She is not a candidate for TNK based on time. She was found to have a L M2 occlusion and was taken to cath lab for possible mechanical thrombectomy.       1. L MCA territory stroke 2/2 L M2 inferior division occlusion   -reviewed above imaging along with Dr. Nelson and patient was subsequently taken to  the cath lab for possible thrombectomy   -Stat [employer] CT head immediately following procedure  -if CTH stable then we will proceed with heparin gtt with no bolus ever  -ASA 81mg and Lipitor 80 mg for secondary stroke prevention   -obtain TTE, lipid panel and A1C  -Dual energy CT in AM  -MRI brain w/o contrast ordered  -PT/OT/SLP consulted and may get patient up in the morning       2. HTN  -SBP goal <140   -cardene gtt PRN to achieve this       3. HLD  -check lipid panel   -recommend Lipitor 80 mg nightly when able to take PO    4. Seizure disorder?  -patient takes Triletpal 300 mg BID at home and after discussion with Dr. Loza it would be best to keep her on this   -please insert NGT so that patient can continue taking     Discussed plan of care with patient, her niece, Dr. Zepeda in ED and Dr. Nelson. Stroke neurology team will continue to follow this patient. Thank you for this consult.     Jeri Layton, APRN  March 2, 2022  15:07 EST      Addendum:   Dual energy CT head without contrast was stable and negative for any hemorrhage.  Heparin drip with no bolus ever has been ordered per Dr. Randall

## 2022-03-02 NOTE — BRIEF OP NOTE
CAROTID CEREBRAL ANGIOGRAM BILATERAL  Progress Note    Maryana EL Bassem  3/2/2022    Pre-op Diagnosis:   Left M2 occlusion       Post-Op Diagnosis Codes:  Same as preoperative    Procedure/CPT® Codes:        Procedure(s):  CAROTID CEREBRAL ANGIOGRAM  Thrombectomy    Surgeon(s):  Hamilton Nelson MD    Anesthesia: Local    Staff:   Scrub Person: Татьяна Alvarez  Documenter: Nany Hair  Invasive Nurse: Cryer, Felicia Dawn, RN         Estimated Blood Loss: minimal    Urine Voided: * No values recorded between 3/2/2022  2:39 PM and 3/2/2022  4:06 PM *    Specimens:                None         Findings: TICI III thrombectomy    Complications: None apparent          Hamilton Nelson MD     Date: 3/2/2022  Time: 16:09 EST

## 2022-03-03 ENCOUNTER — APPOINTMENT (OUTPATIENT)
Dept: CARDIOLOGY | Facility: HOSPITAL | Age: 62
End: 2022-03-03

## 2022-03-03 ENCOUNTER — ANCILLARY PROCEDURE (OUTPATIENT)
Dept: SPEECH THERAPY | Facility: HOSPITAL | Age: 62
End: 2022-03-03

## 2022-03-03 ENCOUNTER — APPOINTMENT (OUTPATIENT)
Dept: GENERAL RADIOLOGY | Facility: HOSPITAL | Age: 62
End: 2022-03-03

## 2022-03-03 LAB
ANION GAP SERPL CALCULATED.3IONS-SCNC: 12 MMOL/L (ref 5–15)
APTT PPP: 22.6 SECONDS (ref 50–95)
BH CV ECHO MEAS - AO MAX PG (FULL): 3.1 MMHG
BH CV ECHO MEAS - AO MAX PG: 9 MMHG
BH CV ECHO MEAS - AO MEAN PG (FULL): 2 MMHG
BH CV ECHO MEAS - AO MEAN PG: 5.5 MMHG
BH CV ECHO MEAS - AO ROOT AREA (BSA CORRECTED): 1.7
BH CV ECHO MEAS - AO ROOT AREA: 8.2 CM^2
BH CV ECHO MEAS - AO ROOT DIAM: 3.2 CM
BH CV ECHO MEAS - AO V2 MAX: 146.6 CM/SEC
BH CV ECHO MEAS - AO V2 MEAN: 112.6 CM/SEC
BH CV ECHO MEAS - AO V2 VTI: 26.6 CM
BH CV ECHO MEAS - ASC AORTA: 3.1 CM
BH CV ECHO MEAS - AVA(I,A): 2.9 CM^2
BH CV ECHO MEAS - AVA(I,D): 2.9 CM^2
BH CV ECHO MEAS - AVA(V,A): 2.6 CM^2
BH CV ECHO MEAS - AVA(V,D): 2.6 CM^2
BH CV ECHO MEAS - BSA(HAYCOCK): 1.9 M^2
BH CV ECHO MEAS - BSA: 1.9 M^2
BH CV ECHO MEAS - BZI_BMI: 27.4 KILOGRAMS/M^2
BH CV ECHO MEAS - BZI_METRIC_HEIGHT: 167.6 CM
BH CV ECHO MEAS - BZI_METRIC_WEIGHT: 77.1 KG
BH CV ECHO MEAS - EDV(CUBED): 58.4 ML
BH CV ECHO MEAS - EDV(MOD-SP2): 55.2 ML
BH CV ECHO MEAS - EDV(MOD-SP4): 67.6 ML
BH CV ECHO MEAS - EDV(TEICH): 65.1 ML
BH CV ECHO MEAS - EF(CUBED): 64.4 %
BH CV ECHO MEAS - EF(MOD-BP): 57.1 %
BH CV ECHO MEAS - EF(MOD-SP2): 57.4 %
BH CV ECHO MEAS - EF(MOD-SP4): 57 %
BH CV ECHO MEAS - EF(TEICH): 56.6 %
BH CV ECHO MEAS - ESV(CUBED): 20.8 ML
BH CV ECHO MEAS - ESV(MOD-SP2): 23.5 ML
BH CV ECHO MEAS - ESV(MOD-SP4): 29.1 ML
BH CV ECHO MEAS - ESV(TEICH): 28.3 ML
BH CV ECHO MEAS - FS: 29.1 %
BH CV ECHO MEAS - IVS/LVPW: 1.3
BH CV ECHO MEAS - IVSD: 0.88 CM
BH CV ECHO MEAS - LA DIMENSION: 3.4 CM
BH CV ECHO MEAS - LA/AO: 1.1
BH CV ECHO MEAS - LAD MAJOR: 4.5 CM
BH CV ECHO MEAS - LAT PEAK E' VEL: 8.1 CM/SEC
BH CV ECHO MEAS - LATERAL E/E' RATIO: 9.6
BH CV ECHO MEAS - LV DIASTOLIC VOL/BSA (35-75): 36.2 ML/M^2
BH CV ECHO MEAS - LV IVRT: 0.06 SEC
BH CV ECHO MEAS - LV MASS(C)D: 86.9 GRAMS
BH CV ECHO MEAS - LV MASS(C)DI: 46.5 GRAMS/M^2
BH CV ECHO MEAS - LV MAX PG: 5.9 MMHG
BH CV ECHO MEAS - LV MEAN PG: 3.5 MMHG
BH CV ECHO MEAS - LV SYSTOLIC VOL/BSA (12-30): 15.6 ML/M^2
BH CV ECHO MEAS - LV V1 MAX: 121.2 CM/SEC
BH CV ECHO MEAS - LV V1 MEAN: 86.9 CM/SEC
BH CV ECHO MEAS - LV V1 VTI: 23.9 CM
BH CV ECHO MEAS - LVIDD: 3.9 CM
BH CV ECHO MEAS - LVIDS: 2.8 CM
BH CV ECHO MEAS - LVLD AP2: 7.2 CM
BH CV ECHO MEAS - LVLD AP4: 7.3 CM
BH CV ECHO MEAS - LVLS AP2: 6 CM
BH CV ECHO MEAS - LVLS AP4: 5.8 CM
BH CV ECHO MEAS - LVOT AREA (M): 3.1 CM^2
BH CV ECHO MEAS - LVOT AREA: 3.2 CM^2
BH CV ECHO MEAS - LVOT DIAM: 2 CM
BH CV ECHO MEAS - LVPWD: 0.7 CM
BH CV ECHO MEAS - MED PEAK E' VEL: 6.2 CM/SEC
BH CV ECHO MEAS - MEDIAL E/E' RATIO: 12.5
BH CV ECHO MEAS - MV A MAX VEL: 111.1 CM/SEC
BH CV ECHO MEAS - MV DEC SLOPE: 260.8 CM/SEC^2
BH CV ECHO MEAS - MV DEC TIME: 0.3 SEC
BH CV ECHO MEAS - MV E MAX VEL: 77.1 CM/SEC
BH CV ECHO MEAS - MV E/A: 0.69
BH CV ECHO MEAS - PA ACC TIME: 0.08 SEC
BH CV ECHO MEAS - PA PR(ACCEL): 44.1 MMHG
BH CV ECHO MEAS - SI(AO): 116.3 ML/M^2
BH CV ECHO MEAS - SI(CUBED): 20.2 ML/M^2
BH CV ECHO MEAS - SI(LVOT): 40.9 ML/M^2
BH CV ECHO MEAS - SI(MOD-SP2): 17 ML/M^2
BH CV ECHO MEAS - SI(MOD-SP4): 20.6 ML/M^2
BH CV ECHO MEAS - SI(TEICH): 19.7 ML/M^2
BH CV ECHO MEAS - SV(AO): 217 ML
BH CV ECHO MEAS - SV(CUBED): 37.6 ML
BH CV ECHO MEAS - SV(LVOT): 76.4 ML
BH CV ECHO MEAS - SV(MOD-SP2): 31.7 ML
BH CV ECHO MEAS - SV(MOD-SP4): 38.5 ML
BH CV ECHO MEAS - SV(TEICH): 36.9 ML
BH CV ECHO MEAS - TAPSE (>1.6): 1.7 CM
BH CV ECHO MEASUREMENTS AVERAGE E/E' RATIO: 10.78
BH CV VAS TCD LEFT DISTAL M1: 117 CM/SEC
BH CV VAS TCD LEFT MID M1: 54 CM/SEC
BH CV VAS TCD LEFT PROXIMAL M1: 62 CM/SEC
BH CV VAS TCD LEFT TERMINAL ICA: 55 CM/SEC
BH CV VAS TCD RIGHT DISTAL M1: 42 CM/SEC
BH CV VAS TCD RIGHT MID M1: 35 CM/SEC
BH CV VAS TCD RIGHT PROXIMAL M1: 53 CM/SEC
BH CV VAS TCD RIGHT TERMINAL ICA: 40 CM/SEC
BH CV XLRA - RV BASE: 2.8 CM
BH CV XLRA - RV LENGTH: 6.6 CM
BH CV XLRA - RV MID: 2 CM
BH CV XLRA - TDI S': 13.1 CM/SEC
BUN SERPL-MCNC: 8 MG/DL (ref 8–23)
BUN/CREAT SERPL: 15.7 (ref 7–25)
CALCIUM SPEC-SCNC: 9.5 MG/DL (ref 8.6–10.5)
CHLORIDE SERPL-SCNC: 95 MMOL/L (ref 98–107)
CHOLEST SERPL-MCNC: 268 MG/DL (ref 0–200)
CO2 SERPL-SCNC: 23 MMOL/L (ref 22–29)
CREAT SERPL-MCNC: 0.51 MG/DL (ref 0.57–1)
DEPRECATED RDW RBC AUTO: 41.7 FL (ref 37–54)
EGFRCR SERPLBLD CKD-EPI 2021: 105.7 ML/MIN/1.73
ERYTHROCYTE [DISTWIDTH] IN BLOOD BY AUTOMATED COUNT: 12.1 % (ref 12.3–15.4)
GLUCOSE BLDC GLUCOMTR-MCNC: 167 MG/DL (ref 70–130)
GLUCOSE BLDC GLUCOMTR-MCNC: 180 MG/DL (ref 70–130)
GLUCOSE BLDC GLUCOMTR-MCNC: 188 MG/DL (ref 70–130)
GLUCOSE BLDC GLUCOMTR-MCNC: 201 MG/DL (ref 70–130)
GLUCOSE BLDC GLUCOMTR-MCNC: 221 MG/DL (ref 70–130)
GLUCOSE BLDC GLUCOMTR-MCNC: 243 MG/DL (ref 70–130)
GLUCOSE SERPL-MCNC: 214 MG/DL (ref 65–99)
HBA1C MFR BLD: 11 % (ref 4.8–5.6)
HCT VFR BLD AUTO: 38 % (ref 34–46.6)
HDLC SERPL-MCNC: 49 MG/DL (ref 40–60)
HGB BLD-MCNC: 12.9 G/DL (ref 12–15.9)
INR PPP: 0.96 (ref 0.85–1.16)
LDLC SERPL CALC-MCNC: 184 MG/DL (ref 0–100)
LDLC/HDLC SERPL: 3.71 {RATIO}
LEFT ATRIUM VOLUME INDEX: 22 ML/M^2
LEFT ATRIUM VOLUME: 41 ML
LV EF 2D ECHO EST: 60 %
MAGNESIUM SERPL-MCNC: 1.8 MG/DL (ref 1.6–2.4)
MCH RBC QN AUTO: 31.5 PG (ref 26.6–33)
MCHC RBC AUTO-ENTMCNC: 33.9 G/DL (ref 31.5–35.7)
MCV RBC AUTO: 92.9 FL (ref 79–97)
PHOSPHATE SERPL-MCNC: 3.1 MG/DL (ref 2.5–4.5)
PLATELET # BLD AUTO: 243 10*3/MM3 (ref 140–450)
PMV BLD AUTO: 10 FL (ref 6–12)
POTASSIUM SERPL-SCNC: 3.5 MMOL/L (ref 3.5–5.2)
POTASSIUM SERPL-SCNC: 4.1 MMOL/L (ref 3.5–5.2)
PROTHROMBIN TIME: 12.5 SECONDS (ref 11.4–14.4)
QT INTERVAL: 390 MS
QTC INTERVAL: 455 MS
RBC # BLD AUTO: 4.09 10*6/MM3 (ref 3.77–5.28)
SODIUM SERPL-SCNC: 130 MMOL/L (ref 136–145)
TRIGL SERPL-MCNC: 187 MG/DL (ref 0–150)
UFH PPP CHRO-ACNC: 0.1 IU/ML (ref 0.3–0.7)
UFH PPP CHRO-ACNC: 0.73 IU/ML (ref 0.3–0.7)
VLDLC SERPL-MCNC: 35 MG/DL (ref 5–40)
WBC NRBC COR # BLD: 6.95 10*3/MM3 (ref 3.4–10.8)

## 2022-03-03 PROCEDURE — 63710000001 INSULIN REGULAR HUMAN PER 5 UNITS: Performed by: INTERNAL MEDICINE

## 2022-03-03 PROCEDURE — 85520 HEPARIN ASSAY: CPT | Performed by: INTERNAL MEDICINE

## 2022-03-03 PROCEDURE — 97530 THERAPEUTIC ACTIVITIES: CPT

## 2022-03-03 PROCEDURE — 84100 ASSAY OF PHOSPHORUS: CPT | Performed by: INTERNAL MEDICINE

## 2022-03-03 PROCEDURE — 80061 LIPID PANEL: CPT | Performed by: INTERNAL MEDICINE

## 2022-03-03 PROCEDURE — 84132 ASSAY OF SERUM POTASSIUM: CPT | Performed by: NURSE PRACTITIONER

## 2022-03-03 PROCEDURE — 85520 HEPARIN ASSAY: CPT

## 2022-03-03 PROCEDURE — 71045 X-RAY EXAM CHEST 1 VIEW: CPT

## 2022-03-03 PROCEDURE — 93306 TTE W/DOPPLER COMPLETE: CPT

## 2022-03-03 PROCEDURE — 93306 TTE W/DOPPLER COMPLETE: CPT | Performed by: INTERNAL MEDICINE

## 2022-03-03 PROCEDURE — 99291 CRITICAL CARE FIRST HOUR: CPT | Performed by: STUDENT IN AN ORGANIZED HEALTH CARE EDUCATION/TRAINING PROGRAM

## 2022-03-03 PROCEDURE — 80048 BASIC METABOLIC PNL TOTAL CA: CPT | Performed by: INTERNAL MEDICINE

## 2022-03-03 PROCEDURE — 83735 ASSAY OF MAGNESIUM: CPT | Performed by: INTERNAL MEDICINE

## 2022-03-03 PROCEDURE — 99233 SBSQ HOSP IP/OBS HIGH 50: CPT | Performed by: INTERNAL MEDICINE

## 2022-03-03 PROCEDURE — 92612 ENDOSCOPY SWALLOW (FEES) VID: CPT | Performed by: SPEECH-LANGUAGE PATHOLOGIST

## 2022-03-03 PROCEDURE — 97535 SELF CARE MNGMENT TRAINING: CPT

## 2022-03-03 PROCEDURE — 85610 PROTHROMBIN TIME: CPT | Performed by: INTERNAL MEDICINE

## 2022-03-03 PROCEDURE — 82962 GLUCOSE BLOOD TEST: CPT

## 2022-03-03 PROCEDURE — 85730 THROMBOPLASTIN TIME PARTIAL: CPT | Performed by: INTERNAL MEDICINE

## 2022-03-03 PROCEDURE — 83036 HEMOGLOBIN GLYCOSYLATED A1C: CPT | Performed by: INTERNAL MEDICINE

## 2022-03-03 PROCEDURE — 93893 TCD STD ICR ART VEN-ART SHNT: CPT

## 2022-03-03 PROCEDURE — 85027 COMPLETE CBC AUTOMATED: CPT | Performed by: INTERNAL MEDICINE

## 2022-03-03 PROCEDURE — 97166 OT EVAL MOD COMPLEX 45 MIN: CPT

## 2022-03-03 PROCEDURE — 97162 PT EVAL MOD COMPLEX 30 MIN: CPT

## 2022-03-03 PROCEDURE — 25010000002 HEPARIN (PORCINE) 25000-0.45 UT/250ML-% SOLUTION

## 2022-03-03 PROCEDURE — 63710000001 INSULIN LISPRO (HUMAN) PER 5 UNITS: Performed by: INTERNAL MEDICINE

## 2022-03-03 PROCEDURE — 92523 SPEECH SOUND LANG COMPREHEN: CPT

## 2022-03-03 PROCEDURE — 63710000001 INSULIN DETEMIR PER 5 UNITS: Performed by: INTERNAL MEDICINE

## 2022-03-03 PROCEDURE — 92610 EVALUATE SWALLOWING FUNCTION: CPT | Performed by: SPEECH-LANGUAGE PATHOLOGIST

## 2022-03-03 RX ORDER — POTASSIUM CHLORIDE 750 MG/1
40 CAPSULE, EXTENDED RELEASE ORAL AS NEEDED
Status: DISCONTINUED | OUTPATIENT
Start: 2022-03-03 | End: 2022-03-08 | Stop reason: HOSPADM

## 2022-03-03 RX ADMIN — ASPIRIN 81 MG 81 MG: 81 TABLET ORAL at 11:24

## 2022-03-03 RX ADMIN — INSULIN HUMAN 5 UNITS: 100 INJECTION, SOLUTION PARENTERAL at 11:24

## 2022-03-03 RX ADMIN — POTASSIUM CHLORIDE 40 MEQ: 750 CAPSULE, EXTENDED RELEASE ORAL at 12:05

## 2022-03-03 RX ADMIN — INSULIN DETEMIR 10 UNITS: 100 INJECTION, SOLUTION SUBCUTANEOUS at 20:52

## 2022-03-03 RX ADMIN — ACETAMINOPHEN 650 MG: 325 TABLET, FILM COATED ORAL at 06:23

## 2022-03-03 RX ADMIN — OXCARBAZEPINE 300 MG: 300 TABLET, FILM COATED ORAL at 00:43

## 2022-03-03 RX ADMIN — INSULIN LISPRO 3 UNITS: 100 INJECTION, SOLUTION INTRAVENOUS; SUBCUTANEOUS at 16:54

## 2022-03-03 RX ADMIN — INSULIN DETEMIR 10 UNITS: 100 INJECTION, SOLUTION SUBCUTANEOUS at 10:08

## 2022-03-03 RX ADMIN — INSULIN HUMAN 3 UNITS: 100 INJECTION, SOLUTION PARENTERAL at 00:46

## 2022-03-03 RX ADMIN — HEPARIN SODIUM 16 UNITS/KG/HR: 10000 INJECTION, SOLUTION INTRAVENOUS at 00:05

## 2022-03-03 RX ADMIN — ACETAMINOPHEN 650 MG: 325 TABLET, FILM COATED ORAL at 16:05

## 2022-03-03 RX ADMIN — OXCARBAZEPINE 300 MG: 300 TABLET, FILM COATED ORAL at 20:45

## 2022-03-03 RX ADMIN — Medication 10 ML: at 20:50

## 2022-03-03 RX ADMIN — INSULIN HUMAN 2 UNITS: 100 INJECTION, SOLUTION PARENTERAL at 06:23

## 2022-03-03 RX ADMIN — OXCARBAZEPINE 300 MG: 300 TABLET, FILM COATED ORAL at 11:24

## 2022-03-03 RX ADMIN — INSULIN LISPRO 2 UNITS: 100 INJECTION, SOLUTION INTRAVENOUS; SUBCUTANEOUS at 20:52

## 2022-03-03 RX ADMIN — ATORVASTATIN CALCIUM 80 MG: 40 TABLET, FILM COATED ORAL at 20:45

## 2022-03-03 RX ADMIN — NICARDIPINE HYDROCHLORIDE 2.5 MG/HR: 25 INJECTION, SOLUTION INTRAVENOUS at 11:07

## 2022-03-03 NOTE — PROGRESS NOTES
INTENSIVIST   PROGRESS NOTE     Hospital:  LOS: 1 day      MYNOR Mijares 61 y.o. female is followed for: Speech Problem       AIS    Hypertension    Dyslipidemia    T2DM     Seizure disorder (on Trileptal)    Depression    As an Intensivist, we provide an integrated approach to the ICU patient and family, medical management of comorbid conditions, including but not limited to electrolytes, glycemic control, organ dysfunction, lead interdisciplinary rounds and coordinate the care with all other services, including those from other specialists.     Interval History:  POD: 1 Day Post-Op (Cerebral angiogram)    Doing fair. Still aphasic.    The patient qualifies to receive the vaccine, but they have not yet received it.     Temp  Min: 98 °F (36.7 °C)  Max: 98.6 °F (37 °C)       History     Last Reviewed by Ron Alvarez, PT on 3/3/2022 at 10:54 AM    Sections Reviewed    Medical, Surgical, Family, Tobacco, Custom, Alcohol, Drug Use, Sexual   Activity    Problem list reviewed by Kailash Conrad MD on 3/3/2022 at  7:46 AM  Medicines reviewed by Kailash Conrad MD on 3/3/2022 at  7:46 AM  Allergies reviewed by Kailash Conrad MD on 3/3/2022 at  7:46 AM       The patient's relevant past medical, surgical and social history were reviewed and updated in Epic as appropriate.        O     Vitals:  Temp: 98.4 °F (36.9 °C) (03/03/22 1200) Temp  Min: 98 °F (36.7 °C)  Max: 98.6 °F (37 °C)   Temp core:      BP: 135/69 (03/03/22 1245) BP  Min: 113/67  Max: 201/119   Pulse: 93 (03/03/22 1300) Pulse  Min: 79  Max: 106   Resp: 16 (03/03/22 1200) Resp  Min: 16  Max: 20   SpO2: 95 % (03/03/22 1300) SpO2  Min: 92 %  Max: 99 %   Device: room air (03/03/22 1200)    Flow Rate:   No data recorded     Intake/Ouptut 24 hrs (7:00AM - 6:59 AM)  Intake & Output (last 3 days)       02/28 0701 03/01 0700 03/01 0701  03/02 0700 03/02 0701 03/03 0700 03/03 0701  03/04 0700    I.V. (mL/kg)   490.9 (6.4) 50 (0.6)    Other   40     Total Intake(mL/kg)    530.9 (6.9) 50 (0.6)    Urine (mL/kg/hr)   900 600 (1.2)    Total Output   900 600    Net   -369.2 -550            Urine Unmeasured Occurrence    1 x          Wt Readings from Last 3 Encounters:   03/03/22 77.1 kg (169 lb 15.6 oz)       Medications (drips):  niCARdipine, Last Rate: 2.5 mg/hr (03/03/22 1107)        Physical Examination  Telemetry:  Rhythm: normal sinus rhythm (03/03/22 1200)         Constitutional:  No acute distress.   Cardiovascular: RRR.   Normal heart sounds.  No murmurs, gallop or rub.   Respiratory: Normal breath sounds  No adventitious sounds.   Abdominal:  Soft with no tenderness.  No distension.   No HSM.   Extremities: Warm.  Dry.  No cyanosis.  No Edema   Neurological:   Awake.   Aphasia.  Best Eye Response: 4-->(E4) spontaneous (03/03/22 1200)  Best Motor Response: 6-->(M6) obeys commands (03/03/22 1200)  Best Verbal Response: 2-->(V2) incomprehensible speech (03/03/22 1200)  Marshall Coma Scale Score: 12 (03/03/22 1200)       NIH Stroke Scale  1a. Level of Consciousness: 0-->Alert, keenly responsive (03/03/22 0700)  1b. LOC Questions: 2-->Answers neither question correctly (03/03/22 0700)  1c. LOC Commands: 0-->Performs both tasks correctly (03/03/22 0700)  2. Best Gaze: 0-->Normal (03/03/22 0700)  3. Visual: 0-->No visual loss (03/03/22 0700)  4. Facial Palsy: 1-->Minor paralysis (flattened nasolabial fold, asymmetry on smiling) (03/03/22 0700)  5a. Motor Arm, Left: 0-->No drift, limb holds 90 (or 45) degrees for full 10 secs (03/03/22 0700)  5b. Motor Arm, Right: 0-->No drift, limb holds 90 (or 45) degrees for full 10 secs (03/03/22 0700)  6a. Motor Leg, Left: 0-->No drift, leg holds 30 degree position for full 5 secs (03/03/22 0700)  6b. Motor Leg, Right: 0-->No drift, leg holds 30 degree position for full 5 secs (03/03/22 0700)  7. Limb Ataxia: 0-->Absent (03/03/22 0700)  8. Sensory: 0-->Normal, no sensory loss (03/03/22 0700)  9. Best Language: 3-->Mute, global aphasia, no usable  speech or auditory comprehension (03/03/22 0700)  10. Dysarthria: 2-->Severe dysarthria, patients speech is so slurred as to be unintelligible in the absence of or out of proportion to any dysphasia, or is mute/anarthric (03/03/22 0700)  11. Extinction and Inattention (formerly Neglect): 0-->No abnormality (03/03/22 0700)  Total (NIH Stroke Scale): 8 (03/03/22 0700)    Results Reviewed:  Laboratory  Microbiology  Radiology  Pathology    Hematology:  Results from last 7 days   Lab Units 03/03/22  0712 03/02/22  1855 03/02/22  1337 03/02/22  1337   WBC 10*3/mm3 6.95 9.00   < > 6.22   HEMOGLOBIN g/dL 12.9 14.0   < > 14.9   MCV fL 92.9 90.9   < > 92.0   PLATELETS 10*3/mm3 243 262   < > 255   NEUTROS ABS 10*3/mm3  --  6.77  --  3.86   LYMPHS ABS 10*3/mm3  --  1.70  --  1.78   EOS ABS 10*3/mm3  --  0.05  --  0.06    < > = values in this interval not displayed.     Chemistry:  Estimated Creatinine Clearance: 119.9 mL/min (A) (by C-G formula based on SCr of 0.51 mg/dL (L)).  Results from last 7 days   Lab Units 03/03/22  0712 03/02/22  1339 03/02/22  1337   SODIUM mmol/L 130*  --  135*   POTASSIUM mmol/L 3.5  --  4.2   CHLORIDE mmol/L 95*  --  98   CO2 mmol/L 23.0  --  23.0   BUN mg/dL 8  --  9   CREATININE mg/dL 0.51* 0.50* 0.63   GLUCOSE mg/dL 214*  --  249*     Results from last 7 days   Lab Units 03/03/22  0712 03/02/22  1337   CALCIUM mg/dL 9.5 9.8   MAGNESIUM mg/dL 1.8  --    PHOSPHORUS mg/dL 3.1  --          Coagulation Labs:  Results from last 7 days   Lab Units 03/03/22  0019 03/02/22  1338 03/02/22  1337   PROTIME Seconds 12.5 11.4*  --    INR  0.96 0.9  --    APTT seconds 22.6*  --  30.7      COVID-19  Lab Results   Component Value Date    COVID19 Presumptive Negative 03/02/2022       Images:  CT Head Without Contrast    Result Date: 3/2/2022  Generally stable appearance of the patient's likely recent/subacute left parietal infarct with trace amount of adjacent iodine contrast staining. No significant new  intracranial disease compared to 1:44 PM exam.   This report was finalized on 3/2/2022 5:28 PM by Dr. Markell Briscoe MD.      CT Angiogram Neck    Result Date: 3/2/2022   1.  Occlusion of the inferior division of the M2 segment of the left middle cerebral artery. 2.  No other intracranial mass or stenosis or occlusion. 3.  No evidence of carotid or vertebral artery stenosis. Findings personally called to Sobia of the stroke team at 2:16 PM on 3/2/2022  This report was finalized on 3/2/2022 2:25 PM by Vignesh Scott MD.      MRI Brain Without Contrast    Result Date: 3/3/2022  Extensive left MCA territory infarct as noted and generally corresponding to patient's CT images. No evidence of involvement of other infarct territory. No evidence of hemorrhage.  This report was finalized on 3/3/2022 8:56 AM by Dr. Markell Briscoe MD.      XR Chest 1 View    Result Date: 3/3/2022  1. Esophagogastric tube below the diaphragm. 2. Clear lungs.  This report was finalized on 3/3/2022 8:10 AM by Rudi Ochoa MD.      CT Head Without Contrast Stroke Protocol    Result Date: 3/2/2022    1.  No evidence of intracranial hemorrhage. 2.  Patchy areas of low-attenuation within the left parietal lobe cortex compatible with age-indeterminate infarct. 3.  Small area of encephalomalacia involving the posterior left parietal lobe compatible with old infarct.  4.  Findings personally communicated to the stroke navigator at 1:47 PM on 3/2/2022   This report was finalized on 3/2/2022 1:56 PM by Vignesh Scott MD.      XR Abdomen KUB    Result Date: 3/2/2022  Feeding tube redundantly coiled in the stomach.    This report was finalized on 3/2/2022 10:24 PM by Dr. Markell Briscoe MD.      CT Angiogram Head w AI Analysis of LVO    Result Date: 3/2/2022   1.  Occlusion of the inferior division of the M2 segment of the left middle cerebral artery. 2.  No other intracranial mass or stenosis or occlusion. 3.  No evidence of carotid or vertebral artery stenosis.  Findings personally called to Sobia of the stroke team at 2:16 PM on 3/2/2022  This report was finalized on 3/2/2022 2:25 PM by Vignesh Scott MD.      CT CEREBRAL PERFUSION WITH & WITHOUT CONTRAST    Result Date: 3/2/2022    1.  Abnormal increased mean transit time and time to peak involving the left temporal and left parietal lobes compatible with ischemia.  No blood flow less than 30% to suggest core infarct at this time. 2.  Findings communicated to Sobia of this stroke team at 2:10 PM on 3/2/2022  This report was finalized on 3/2/2022 2:14 PM by Vignesh Scott MD.      Invasive peripheral vascular study    Result Date: 3/3/2022   TICI 3 thrombectomy of an acute left M2 occlusion.  Additionally, successful vasospasm treatment of the cervical segment of the ICA with intra-arterial verapamil.  No complications noted.       Echo:      Results: Reviewed.  I reviewed the patient's new laboratory and imaging results.  I independently reviewed the patient's new images.    Medications: Reviewed.    Assessment/Plan   A / P     Maryana is a 61 y.o. female admitted on 3/2/2022 with Acute CVA (cerebrovascular accident) (HCC) [I63.9]:    1. AIS: Left temporal and left parietal lobes.  1. S/P Cerebral angiogram: Mechanical thrombectomy 03/02/22  2. HTN  3. Dyslipidemia - statin  4. PMH: Seizure on Oxcarbazepine  5. Depression on  Cymbalta.  6. T2 Diabetes      Results from last 7 days   Lab Units 03/03/22  1124 03/03/22  0515 03/03/22  0012 03/02/22  2336 03/02/22  1742   GLUCOSE mg/dL 243* 180* 201* 197* 226*     Lab Results   Lab Value Date/Time    HGBA1C 11.00 (H) 03/03/2022 0712       Nutrition Support: Patient isn't on Tube Feeding   Modulars: Patient doesn't have any tube feeding modular orders   Diet: Diet Dysphagia; IV - Mechanical Soft No Mixed Consistencies; Nectar / Syrup Thick; Cardiac, Consistent Carbohydrate   Advance Directives: Code Status and Medical Interventions:   Ordered at: 03/02/22 1082     Code Status  (Patient has no pulse and is not breathing):    CPR (Attempt to Resuscitate)     Medical Interventions (Patient has pulse or is breathing):    Full Support        Plan:    1. Discussed with Dr. Jose Eduardo Garcia (Vascular Neurologist).   1. Discontinue anticoagulation with UFH  2. Start PO intake as per ST.  3. LINDA  4. Goal: Glucose < 180 mg/dL.  1. Insulin basal + bolus. Re-assess when she starts eating.  5. Disposition: Keep in ICU.  } To floor when OK with Stroke Team and Neurosurgery.    Plan of care and goals reviewed during interdisciplinary rounds.  I discussed the patient's findings and my recommendations with patient    Level of Risk is High due to:  illness with threat to life or bodily function and abrupt change in neurological status.     Time: 35 minutes, in direct patient care, with the patient and/or on the tripathi coordinating care with other health care providers.     I have spent > 50% percent of this time, counseling and discussing treatment options.     [x]  Primary Attending  []  Consultant

## 2022-03-03 NOTE — PLAN OF CARE
Goal Outcome Evaluation:  Plan of Care Reviewed With: patient        Progress: no change  Outcome Summary: PT evaluation complete on patient s/p thrombectomy presenting with R visual field deficits, decreased balance, and decreased independence. Pt ambulated 60 ft with CGA x1 +1, frequent VCs for R visual scanning. Pt decreased independence warrants skilled IP PT interventions. Recommend D/C to IRF.

## 2022-03-03 NOTE — PLAN OF CARE
Neurointerventional Metrics    Last Known Well:  1700 on 3/1/22    [facility] Arrival (ED/transfer):  1242 on 3/2/22    Code Stroke Initiated (Inpatient):  n/a    Initial NIHSS: 10    Baseline MRS:  0    IV tPA:  No    CT Head:  1344    CT Perfusion: 1350    Arrival to Cath Lab:  1439    Groin Access: 1453    Initial Thrombectomy/Intervention (stent retriever deployment/aspiration/IA tPA):  1530    Reperfusion:  1535    Final Angiogram:  1554    End of Procedure:  1606    Pre-Procedure TICI flow:  0    Post-Procedure TICI flow:  3    Emboli to New Vascular Territory:  No

## 2022-03-03 NOTE — PLAN OF CARE
Goal Outcome Evaluation:    Patient passed speech and FEES. Placed on Dys 4 [place] diet. Pills in applesauce/pudding.     LINDA set for tomorrow. Consent signed and in chart. NPO at midnight.     Potassium 3.5. Replaced. Awaiting redraw results.

## 2022-03-03 NOTE — DISCHARGE INSTR - DIET
3/3/22  FEES Reason for Referral  Patient was referred for a FEES to assess the efficiency of his/her swallow function, rule out aspiration and make recommendations regarding safe dietary consistencies, effective compensatory strategies, and safe eating environment.         Recommendations/Treatment  SLP Swallowing Diagnosis: mild-moderate, oral dysphagia, pharyngeal dysphagia (03/03/22 1100)  Functional Impact: risk of aspiration/pneumonia (03/03/22 1100)  Rehab Potential/Prognosis, Swallowing: good, to achieve stated therapy goals (03/03/22 1100)  Swallow Criteria for Skilled Therapeutic Interventions Met: demonstrates skilled criteria (03/03/22 1100)  Therapy Frequency (Swallow): 5 days per week (03/03/22 1100)  SLP Diet Recommendation: mechanical soft with no mixed consistencies, nectar thick liquids (03/03/22 1100)  Recommended Diagnostics: FEES (03/03/22 0900)  Recommended Precautions and Strategies: upright posture during/after eating, small bites of food and sips of liquid, general aspiration precautions, reflux precautions, check mouth frequently for oral residue/pocketing (03/03/22 1100)  SLP Rec. for Method of Medication Administration: meds whole, meds crushed, with pudding or applesauce, as tolerated (03/03/22 1100)  Monitor for Signs of Aspiration: yes, notify SLP if any concerns (03/03/22 1100)  Anticipated Discharge Disposition (SLP): unknown, anticipate therapy at next level of care (03/03/22 1100)    Instrumental Set-up  Risks/Benefits Reviewed: risks/benefits explained, patient, agreed to eval (03/03/22 1100)  Nasal Entry: left: (03/03/22 1100)  Anatomic Considerations: no anatomic structural deviation (03/03/22 1100)  Consistencies Trialed: ice chips, thin liquids, nectar-thick liquids, pudding/puree, regular textures, spoon, cup, straw (03/03/22 1100)    Oral Preparation/ Oral Phase  Oral Phase: prolonged manipulation, anterior loss, prespill of liquids into pharynx (03/03/22 1100)    Pharyngeal  Phase  Initiation of Pharyngeal Swallow: bolus in pyriform sinuses (03/03/22 1100)  Pharyngeal Phase: impaired pharyngeal phase of swallowing (03/03/22 1100)  Aspiration During the Swallow: thin liquids, secondary to delayed swallow initiation or mistiming, secondary to reduced laryngeal elevation, secondary to reduced vestibular closure (03/03/22 1100)  Aspiration After the Swallow: thin liquids, all consistencies tested, in laryngeal vestibule, in pyriform sinuses (03/03/22 1100)  Response to Aspiration: no response, silent aspiration, response with cue only, could not clear subglottic material (03/03/22 1100)  Rosenbek's Scale: thin:, 7-->Level 7, nectar:, pudding/puree:, regular textures:, 1-->Level 1 (03/03/22 1100)  Residue: thin liquids, secondary to reduced posterior pharyngeal wall stripping, secondary to reduced laryngeal elevation, secondary to reduced hyolaryngeal excursion (03/03/22 1100)  Response to Residue: other (see comments) (partially cleared) (03/03/22 1100)  Attempted Compensatory Maneuvers: bolus presentation style, bolus size, additional subsequent swallow, multiple swallows, throat clear after swallow (03/03/22 1100)  Response to Attempted Compensatory Maneuvers: did not prevent penetration, did not prevent aspiration, reduced residue (03/03/22 1100)  FEES Summary: FEES completed this am. Mrs. Luevano is positioned upright and centered in bedside chair to accept po presentations of puree, solid cracker thin and nectar thic liquids. Oral phase is mild-moderately impaired w/ anterior loss w/ thin liquids. Prolonged manipulation w/ solids. Prespill w/ thin liquids to the pyriforms. Silent aspiration w/ thin liquids occurring during the swallow. Continued aspiration of thin liquid residue after the swallow. Cued cough ineffective to clear. No penetration or aspiration evidenced w/ puree solid or nectar thick liquids even when pushed. Per this evaluation Mrs. Luevano presents w/a mild-moderate  oropharyngeal dysphagia. Will most benefit from modified po diet of dysphagia level IV w/ nectar thick liquids. Meds whole in puree. SLP to f/u for dysphagia tx (03/03/22 1100)

## 2022-03-03 NOTE — MBS/VFSS/FEES
Acute Care - Speech Language Pathology   Swallow Initial Evaluation [facility]   Clinical Swallow Evaluation  Fiberoptic Endoscopic Evaluation of Swallowing (FEES)       Patient Name: Maryana Luevano  : 1960  MRN: [id number]  Today's Date: 3/3/2022               Admit Date: 3/2/2022    Visit Dx:     ICD-10-CM ICD-9-CM   1. Oropharyngeal dysphagia  R13.12 787.22     Patient Active Problem List   Diagnosis   • AIS   • Hypertension   • Dyslipidemia   • T2DM    • Seizure disorder (on Trileptal)   • Depression     Past Medical History:   Diagnosis Date   • Depression 3/2/2022   • Dyslipidemia 3/2/2022   • Hypertension 3/2/2022   • Seizure disorder (on Trileptal) 3/2/2022   • T2DM  3/2/2022     Past Surgical History:   Procedure Laterality Date   • INTERVENTIONAL RADIOLOGY PROCEDURE Bilateral 3/2/2022    Procedure: CAROTID CEREBRAL ANGIOGRAM BILATERAL;  Surgeon: Hamilton Nelson MD;  Location: [facility] INVASIVE LOCATION;  Service: Interventional Radiology;  Laterality: Bilateral;       SLP Recommendation and Plan  SLP Swallowing Diagnosis: mild-moderate, oral dysphagia, pharyngeal dysphagia (22 1100)  SLP Diet Recommendation: mechanical soft with no mixed consistencies, nectar thick liquids (22 1100)  Recommended Precautions and Strategies: upright posture during/after eating, small bites of food and sips of liquid, general aspiration precautions, reflux precautions, check mouth frequently for oral residue/pocketing (22 1100)  SLP Rec. for Method of Medication Administration: meds whole, meds crushed, with pudding or applesauce, as tolerated (22 1100)     Monitor for Signs of Aspiration: yes, notify SLP if any concerns (22 1100)  Recommended Diagnostics: FEES (22 0900)  Swallow Criteria for Skilled Therapeutic Interventions Met: demonstrates skilled criteria (22 1100)  Anticipated Discharge Disposition (SLP): unknown, anticipate therapy at next level of care (22  1100)  Rehab Potential/Prognosis, Swallowing: good, to achieve stated therapy goals (03/03/22 1100)  Therapy Frequency (Swallow): 5 days per week (03/03/22 1100)                                     Plan of Care Reviewed With: patient  Progress:  (initial eval)      SWALLOW EVALUATION (last 72 hours)     SLP Adult Swallow Evaluation     Row Name 03/03/22 1100       Rehab Evaluation    Document Type evaluation  -    Subjective Information no complaints  -CJ    Patient Observations alert; cooperative  -    Patient/Family/Caregiver Comments/Observations no family present  -CJ    Patient Effort good  -CJ    Symptoms Noted During/After Treatment none  -            General Information    Patient Profile Reviewed yes  -CJ    Pertinent History Of Current Problem see am eval; referred for FEES  -    Current Method of Nutrition NPO; nasogastric feedings  -    Precautions/Limitations, Vision other (see comments)  -    Precautions/Limitations, Hearing WFL; for purposes of eval  -    Prior Level of Function-Communication WFL  -    Prior Level of Function-Swallowing no diet consistency restrictions  -    Plans/Goals Discussed with patient; agreed upon  -    Barriers to Rehab none identified  -    Patient's Goals for Discharge patient did not state  -            Pain    Additional Documentation --            Pain Scale: FACES Pre/Post-Treatment    Pain: FACES Scale, Pretreatment 0-->no hurt  -CJ    Posttreatment Pain Rating 0-->no hurt  -            Oral Motor Structure and Function    Dentition Assessment --    Secretion Management --    Mucosal Quality --                    Fiberoptic Endoscopic Evaluation of Swallowing (FEES)    Risks/Benefits Reviewed risks/benefits explained; patient; agreed to eval  -    Nasal Entry left:  -    Scope serial number/identification [id number]  -    Special Considerations keofeed  -            Anatomy and Physiology    Anatomic Considerations no anatomic structural  deviation  -CJ    Velopharyngeal WFL  -CJ    Base of Tongue symmetrical  -CJ    Epiglottis WFL  -CJ    Laryngeal Function Breathing symmetrical  -CJ    Laryngeal Function Phonation symmetrical  -CJ    Laryngeal Function to Breath Hold TVF contact; sustained closure  -CJ    Secretion Rating Scale (Benito et al. 1996) 1- secretions present around the laryngeal vestibule  -CJ    Secretion Description thin; clear  -CJ    Ice Chips elicited swallow; partially cleared secretions  -CJ    Spontaneous Swallow frequency reduced  -CJ    Sensory sensed scope  -CJ    Consistencies Trialed ice chips; thin liquids; nectar-thick liquids; pudding/puree; regular textures; spoon; cup; straw  -CJ            FEES Interpretation    Oral Phase prolonged manipulation; anterior loss; prespill of liquids into pharynx  -CJ            Initiation of Pharyngeal Swallow    Initiation of Pharyngeal Swallow bolus in pyriform sinuses  -CJ    Pharyngeal Phase impaired pharyngeal phase of swallowing  -CJ    Aspiration During the Swallow thin liquids; secondary to delayed swallow initiation or mistiming; secondary to reduced laryngeal elevation; secondary to reduced vestibular closure  -CJ    Aspiration After the Swallow thin liquids; all consistencies tested; in laryngeal vestibule; in pyriform sinuses  -CJ    Response to Penetration deep; no response  -CJ    Response to Aspiration no response, silent aspiration; response with cue only; could not clear subglottic material  -CJ    Rosenbek's Scale thin:; 7-->Level 7; nectar:; pudding/puree:; regular textures:; 1-->Level 1  -CJ    Residue thin liquids; secondary to reduced posterior pharyngeal wall stripping; secondary to reduced laryngeal elevation; secondary to reduced hyolaryngeal excursion  -CJ    Response to Residue other (see comments)  partially cleared  -CJ    Attempted Compensatory Maneuvers bolus presentation style; bolus size; additional subsequent swallow; multiple swallows; throat clear  after swallow  -    Response to Attempted Compensatory Maneuvers did not prevent penetration; did not prevent aspiration; reduced residue  -    FEES Summary FEES completed this am. Mrs. Luevano is positioned upright and centered in bedside chair to accept po presentations of puree, solid cracker thin and nectar thic liquids. Oral phase is mild-moderately impaired w/ anterior loss w/ thin liquids. Prolonged manipulation w/ solids. Prespill w/ thin liquids to the pyriforms. Silent aspiration w/ thin liquids occurring during the swallow. Continued aspiration of thin liquid residue after the swallow. Cued cough ineffective to clear. No penetration or aspiration evidenced w/ puree solid or nectar thick liquids even when pushed. Per this evaluation Mrs. Luevano presents w/a mild-moderate oropharyngeal dysphagia. Will most benefit from modified po diet of dysphagia level IV w/ nectar thick liquids. Meds whole in puree. SLP to f/u for dysphagia tx  -            SLP Evaluation Clinical Impression    SLP Swallowing Diagnosis mild-moderate; oral dysphagia; pharyngeal dysphagia  -    Functional Impact risk of aspiration/pneumonia  -    Rehab Potential/Prognosis, Swallowing good, to achieve stated therapy goals  -    Swallow Criteria for Skilled Therapeutic Interventions Met demonstrates skilled criteria  -            Recommendations    Therapy Frequency (Swallow) 5 days per week  -    SLP Diet Recommendation mechanical soft with no mixed consistencies; nectar thick liquids  -    Recommended Diagnostics --    Recommended Precautions and Strategies upright posture during/after eating; small bites of food and sips of liquid; general aspiration precautions; reflux precautions; check mouth frequently for oral residue/pocketing  -    Oral Care Recommendations Oral Care BID/PRN  -    SLP Rec. for Method of Medication Administration meds whole; meds crushed; with pudding or applesauce; as tolerated  -    Monitor for  Signs of Aspiration yes; notify SLP if any concerns  -    Anticipated Discharge Disposition (SLP) unknown; anticipate therapy at next level of care  -          User Key  (r) = Recorded By, (t) = Taken By, (c) = Cosigned By    Initials Name Effective Dates    Linnea Gutierrez, MS CCC-SLP 06/16/21 -                 EDUCATION  The patient has been educated in the following areas:   Dysphagia (Swallowing Impairment) Oral Care/Hydration Modified Diet Instruction.        SLP GOALS     Row Name 03/03/22 1100             Oral Nutrition/Hydration Goal 1 (SLP)    Oral Nutrition/Hydration Goal 1, SLP LTG: Pt will tolerate least restrictive level of po intake w/o overt s/s of aspiration/distress given no cues w/ 100% acc  -CJ      Time Frame (Oral Nutrition/Hydration Goal 1, SLP) by discharge  -              Oral Nutrition/Hydration Goal 2 (SLP)    Oral Nutrition/Hydration Goal 2, SLP STG: Pt will accept trials of soft solids and nectar thick liquids w/o overt s/s of aspiration/distress given no cues w/ 100% acc  -CJ      Time Frame (Oral Nutrition/Hydration Goal 2, SLP) short term goal (STG)  -              Oral Nutrition/Hydration Goal (SLP)    Oral Nutrition/Hydration Goal, SLP STG: Pt will accept therapeutic trials of ice chips/thin water w/o overt s/s of aspiraton/distress given no cues w/ 70% acc to determine pt readiness for repeat instrumental dysphagia evaluation  -      Time Frame (Oral Nutrition/Hydration Goal, SLP) short term goal (STG)  -              Labial Strengthening Goal 1 (SLP)    Activity (Labial Strengthening Goal 1, SLP) increase labial tone  -      Increase Labial Tone swallow trials; labial resistance exercises  -      [place]/Accuracy (Labial Strengthening Goal 1, SLP) with moderate cues (50-74% accuracy)  -      Time Frame (Labial Strengthening Goal 1, SLP) short term goal (STG)  -              Lingual Strengthening Goal 1 (SLP)    Activity (Lingual Strengthening Goal 1,  SLP) increase lingual tone/sensation/control/coordination/movement; increase tongue back strength  -CJ      Increase Lingual Tone/Sensation/Control/Coordination/Movement swallow trials; lingual resistance exercises  -CJ      Increase Tongue Back Strength lingual resistance exercises  -CJ      [place]/Accuracy (Lingual Strengthening Goal 1, SLP) with moderate cues (50-74% accuracy)  -CJ      Time Frame (Lingual Strengthening Goal 1, SLP) short term goal (STG)  -CJ              Pharyngeal Strengthening Exercise Goal 1 (SLP)    Activity (Pharyngeal Strengthening Goal 1, SLP) increase timing; increase superior movement of the hyolaryngeal complex; increase anterior movement of the hyolaryngeal complex; increase squeeze/positive pressure generation; increase closure at entrance to airway/closure of airway at glottis  -CJ      Increase Timing prepping - 3 second prep or suck swallow or 3-step swallow  -CJ      Increase Superior Movement of the Hyolaryngeal Complex effortful pitch glide (falsetto + pharyngeal squeeze)  -CJ      Increase Anterior Movement of the Hyolaryngeal Complex chin tuck against resistance (CTAR)  -CJ      Increase Closure at Entrance to Airway/Closure of Airway at Glottis supraglottic swallow  -CJ      Increase Squeeze/Positive Pressure Generation hard effortful swallow  -CJ      [place]/Accuracy (Pharyngeal Strengthening Goal 1, SLP) with moderate cues (50-74% accuracy)  -CJ      Time Frame (Pharyngeal Strengthening Goal 1, SLP) short term goal (STG)  -CJ              Swallow Compensatory Strategies Goal 1 (SLP)    Activity (Swallow Compensatory Strategies/Techniques Goal 1, SLP) compensatory strategies; during meal intake; during p.o. trials; small bites; small cup sips; small straw sips; food/liquid placed on stronger left side  -CJ      [place]/Accuracy (Swallow Compensatory Strategies/Techniques Goal 1, SLP) with minimal cues (75-90% accuracy)  -CJ      Time Frame (Swallow  Compensatory Strategies/Techniques Goal 1, SLP) short term goal (STG)  -            User Key  (r) = Recorded By, (t) = Taken By, (c) = Cosigned By    Initials Name Provider Type    Linnea Gutierrez MS CCC-ABDOUL Speech and Language Pathologist                   Time Calculation:    Time Calculation- SLP     Row Name 03/03/22 1405             Time Calculation- SLP    SLP Start Time 1100  -CJ      SLP Received On 03/03/22  -              Untimed Charges    42643-TI Eval Oral Pharyng Swallow Minutes 40  -CJ      75091-MD Fiberoptic Endo Eval Swallow Minutes 68  -CJ              Total Minutes    Untimed Charges Total Minutes 108  -CJ       Total Minutes 108  -CJ            User Key  (r) = Recorded By, (t) = Taken By, (c) = Cosigned By    Initials Name Provider Type    Linnea Gutierrez MS CCC-ABDOUL Speech and Language Pathologist                Therapy Charges for Today     Code Description Service Date Service Provider Modifiers Qty    52860340591 HC ST EVAL ORAL PHARYNG SWALLOW 3 3/3/2022 Linnea France MS CCC-SLP GN 1    [id number] HC ST FIBEROPTIC ENDO EVAL SWALL 5 3/3/2022 Linnea France MS CCC-ABDOUL GN 1        Patient was not wearing a face mask and did exhibit coughing during this therapy encounter.  Procedure performed was aerosolizing, involved close contact (within 6 feet for at least 15 minutes or longer), and did not involve contact with infectious secretions or specimens.  Therapist used appropriate personal protective equipment including gloves, standard procedure mask and eye protection.  Appropriate PPE was worn during the entire therapy session.  Hand hygiene was completed before and after therapy session.            MS AZAEL Tilley  3/3/2022

## 2022-03-03 NOTE — PROGRESS NOTES
"Stroke Progress Note       Chief Complaint:  Speech difficulty    Subjective    Subjective     Subjective:  Weakness improved.       Review of Systems   UTO   Objective      Temp:  [98 °F (36.7 °C)-98.6 °F (37 °C)] 98.4 °F (36.9 °C)  Heart Rate:  [] 93  Resp:  [16-20] 16  BP: (113-158)/(61-99) 135/69    NEURO    MENTAL STATUS: alert, follows commands on verbal cues,  Mixed aphasia. Says few words like \"thank you \"      CRANIAL NERVES:    Pupils equal and reactive, EOMI intact, no gaze deviation, no nystagmus  R facial droop, cough and gag intact, shoulder shrug intact, tongue midline     MOTOR:  Moves all extremities equally    SENSORY: Normal to light touch all throughout     COORDINATION: Normal finger to nose and heel to shin, no tremor, no dysmetria    STATION: Not assessed due to patient condition    GAIT: Not assessed due to patient condition  Results Review:    I reviewed the patient's new clinical results.    Lab Results (last 24 hours)     Procedure Component Value Units Date/Time    POC Glucose Once [[id number]]  (Abnormal) Collected: 03/03/22 1124    Specimen: Blood Updated: 03/03/22 1133     Glucose 243 mg/dL      Comment: Meter: [id number] : [id number] Jordan LOWE       Heparin Anti-Xa [[id number]]  (Abnormal) Collected: 03/03/22 0935    Specimen: Blood Updated: 03/03/22 1038     Heparin Anti-Xa (UFH) 0.73 IU/ml     Lipid Panel [[id number]]  (Abnormal) Collected: 03/03/22 0712    Specimen: Blood Updated: 03/03/22 0806     Total Cholesterol 268 mg/dL      Triglycerides 187 mg/dL      HDL Cholesterol 49 mg/dL      LDL Cholesterol  184 mg/dL      VLDL Cholesterol 35 mg/dL      LDL/HDL Ratio 3.71    Narrative:      Cholesterol Reference Ranges  (U.[place]. Department of Health and Human Services ATP III Classifications)    Desirable          <200 mg/dL  Borderline High    200-239 mg/dL  High Risk          >240 mg/dL      Triglyceride Reference Ranges  (U.S. Department of Health and Human Services ATP " III Classifications)    Normal           <150 mg/dL  Borderline High  150-199 mg/dL  High             200-499 mg/dL  Very High        >500 mg/dL    HDL Reference Ranges  (U.S. Department of Health and Human Services ATP III Classifications)    Low     <40 mg/dl (major risk factor for CHD)  High    >60 mg/dl ('negative' risk factor for CHD)        LDL Reference Ranges  (U.S. Department of Health and Human Services ATP III Classifications)    Optimal          <100 mg/dL  Near Optimal     100-129 mg/dL  Borderline High  130-159 mg/dL  High             160-189 mg/dL  Very High        >189 mg/dL    Basic Metabolic Panel [[id number]]  (Abnormal) Collected: 03/03/22 0712    Specimen: Blood Updated: 03/03/22 0806     Glucose 214 mg/dL      BUN 8 mg/dL      Creatinine 0.51 mg/dL      Sodium 130 mmol/L      Potassium 3.5 mmol/L      Chloride 95 mmol/L      CO2 23.0 mmol/L      Calcium 9.5 mg/dL      BUN/Creatinine Ratio 15.7     Anion Gap 12.0 mmol/L      eGFR 105.7 mL/min/1.73      Comment: National Kidney Foundation and American Society of Nephrology (ASN) Task Force recommended calculation based on the Chronic Kidney Disease Epidemiology Collaboration (CKD-EPI) equation refit without adjustment for race.       Narrative:      GFR Normal >60  Chronic Kidney Disease <60  Kidney Failure <15      Magnesium [[id number]]  (Normal) Collected: 03/03/22 0712    Specimen: Blood Updated: 03/03/22 0806     Magnesium 1.8 mg/dL     Phosphorus [[id number]]  (Normal) Collected: 03/03/22 0712    Specimen: Blood Updated: 03/03/22 0806     Phosphorus 3.1 mg/dL     Hemoglobin A1c [[id number]]  (Abnormal) Collected: 03/03/22 0712    Specimen: Blood Updated: 03/03/22 0737     Hemoglobin A1C 11.00 %     Narrative:      Hemoglobin A1C Ranges:    Increased Risk for Diabetes  5.7% to 6.4%  Diabetes                     >= 6.5%  Diabetic Goal                < 7.0%    CBC (No Diff) [[id number]]  (Abnormal) Collected: 03/03/22 0712    Specimen: Blood  Updated: 03/03/22 0721     WBC 6.95 10*3/mm3      RBC 4.09 10*6/mm3      Hemoglobin 12.9 g/dL      Hematocrit 38.0 %      MCV 92.9 fL      MCH 31.5 pg      MCHC 33.9 g/dL      RDW 12.1 %      RDW-SD 41.7 fl      MPV 10.0 fL      Platelets 243 10*3/mm3     POC Glucose Once [[id number]]  (Abnormal) Collected: 03/03/22 0515    Specimen: Blood Updated: 03/03/22 0517     Glucose 180 mg/dL      Comment: Meter: [id number] : [id number] Ramiro Worrell       Heparin Anti-Xa [[id number]]  (Abnormal) Collected: 03/03/22 0019    Specimen: Blood Updated: 03/03/22 0123     Heparin Anti-Xa (UFH) 0.10 IU/ml     Protime-INR [[id number]]  (Normal) Collected: 03/03/22 0019    Specimen: Blood Updated: 03/03/22 0122     Protime 12.5 Seconds      INR 0.96    aPTT [[id number]]  (Abnormal) Collected: 03/03/22 0019    Specimen: Blood Updated: 03/03/22 0122     PTT 22.6 seconds     Narrative:      PTT = The equivalent PTT values for the therapeutic range of heparin levels at 0.3 to 0.5 U/ml are 55 to 70 seconds.    POC Glucose Once [[id number]]  (Abnormal) Collected: 03/03/22 0012    Specimen: Blood Updated: 03/03/22 0013     Glucose 201 mg/dL      Comment: Meter: [id number] : [id number] Dainna Gallegos       POC Glucose Once [[id number]]  (Abnormal) Collected: 03/02/22 2336    Specimen: Blood Updated: 03/02/22 2337     Glucose 197 mg/dL      Comment: Meter: [id number] : [id number] Ramiro Worrell       Urine Drug Screen - Urine, Clean Catch [[id number]]  (Normal) Collected: 03/02/22 2032    Specimen: Urine, Clean Catch Updated: 03/02/22 2053     THC, Screen, Urine Negative     Phencyclidine (PCP), Urine Negative     Cocaine Screen, Urine Negative     Methamphetamine, Ur Negative     Opiate Screen Negative     Amphetamine Screen, Urine Negative     Benzodiazepine Screen, Urine Negative     Tricyclic Antidepressants Screen Negative     Methadone Screen, Urine Negative     Barbiturates Screen, Urine Negative     Oxycodone Screen, Urine  Negative     Propoxyphene Screen Negative     Buprenorphine, Screen, Urine Negative    Narrative:      Cutoff For Drugs Screened:    Amphetamines               500 ng/ml  Barbiturates               200 ng/ml  Benzodiazepines            150 ng/ml  Cocaine                    150 ng/ml  Methadone                  200 ng/ml  Opiates                    100 ng/ml  Phencyclidine               25 ng/ml  THC                            50 ng/ml  Methamphetamine            500 ng/ml  Tricyclic Antidepressants  300 ng/ml  Oxycodone                  100 ng/ml  Propoxyphene               300 ng/ml  Buprenorphine               10 ng/ml    The normal value for all drugs tested is negative. This report includes unconfirmed screening results, with the cutoff values listed, to be used for medical treatment purposes only.  Unconfirmed results must not be used for non-medical purposes such as employment or legal testing.  Clinical consideration should be applied to any drug of abuse test, particularly when unconfirmed results are used.      CBC & Differential [[id number]]  (Abnormal) Collected: 03/02/22 1855    Specimen: Blood Updated: 03/02/22 1900    Narrative:      The following orders were created for panel order CBC & Differential.  Procedure                               Abnormality         Status                     ---------                               -----------         ------                     CBC Auto Differential[[id number]]        Abnormal            Final result                 Please view results for these tests on the individual orders.    CBC Auto Differential [[id number]]  (Abnormal) Collected: 03/02/22 1855    Specimen: Blood Updated: 03/02/22 1900     WBC 9.00 10*3/mm3      RBC 4.52 10*6/mm3      Hemoglobin 14.0 g/dL      Hematocrit 41.1 %      MCV 90.9 fL      MCH 31.0 pg      MCHC 34.1 g/dL      RDW 12.2 %      RDW-SD 40.6 fl      MPV 9.9 fL      Platelets 262 10*3/mm3      Neutrophil % 75.2 %      Lymphocyte %  18.9 %      Monocyte % 4.6 %      Eosinophil % 0.6 %      Basophil % 0.4 %      Immature Grans % 0.3 %      Neutrophils, Absolute 6.77 10*3/mm3      Lymphocytes, Absolute 1.70 10*3/mm3      Monocytes, Absolute 0.41 10*3/mm3      Eosinophils, Absolute 0.05 10*3/mm3      Basophils, Absolute 0.04 10*3/mm3      Immature Grans, Absolute 0.03 10*3/mm3      nRBC 0.0 /100 WBC     POC Glucose Once [[id number]]  (Abnormal) Collected: 03/02/22 1742    Specimen: Blood Updated: 03/02/22 1753     Glucose 226 mg/dL      Comment: Meter: [id number] : [id number] Cooksey Brenda       [facility] [[id number]] Collected: 03/02/22 1337    Specimen: Blood Updated: 03/02/22 1745    Narrative:      The following orders were created for panel order [facility].  Procedure                               Abnormality         Status                     ---------                               -----------         ------                     Green Top (Gel)[[id number]]                                  Final result               Lavender Top[[id number]]                                     Final result               Gold Top - SST[[id number]]                                   Final result               Valdez Top[[id number]]                                         Final result               Light Blue Top[[id number]]                                   Final result                 Please view results for these tests on the individual orders.    Gray Top [[id number]] Collected: 03/02/22 1337    Specimen: Blood Updated: 03/02/22 1745     Extra Tube Hold for add-ons.     Comment: Auto resulted.       Basic Metabolic Panel [[id number]]  (Abnormal) Collected: 03/02/22 1337    Specimen: Blood Updated: 03/02/22 1731     Glucose 249 mg/dL      BUN 9 mg/dL      Creatinine 0.63 mg/dL      Sodium 135 mmol/L      Potassium 4.2 mmol/L      Chloride 98 mmol/L      CO2 23.0 mmol/L      Calcium 9.8 mg/dL      BUN/Creatinine Ratio 14.3     Anion Gap 14.0 mmol/L      eGFR  100.4 mL/min/1.73      Comment: National Kidney Foundation and American Society of Nephrology (ASN) Task Force recommended calculation based on the Chronic Kidney Disease Epidemiology Collaboration (CKD-EPI) equation refit without adjustment for race.       Narrative:      GFR Normal >60  Chronic Kidney Disease <60  Kidney Failure <15          CT Head Without Contrast    Result Date: 3/2/2022  Generally stable appearance of the patient's likely recent/subacute left parietal infarct with trace amount of adjacent iodine contrast staining. No significant new intracranial disease compared to 1:44 PM exam.   This report was finalized on 3/2/2022 5:28 PM by Dr. Markell Briscoe MD.      CT Angiogram Neck    Result Date: 3/2/2022   1.  Occlusion of the inferior division of the M2 segment of the left middle cerebral artery. 2.  No other intracranial mass or stenosis or occlusion. 3.  No evidence of carotid or vertebral artery stenosis. Findings personally called to [place] of the stroke team at 2:16 PM on 3/2/2022  This report was finalized on 3/2/2022 2:25 PM by Vignesh Scott MD.      MRI Brain Without Contrast    Result Date: 3/3/2022  Extensive left MCA territory infarct as noted and generally corresponding to patient's CT images. No evidence of involvement of other infarct territory. No evidence of hemorrhage.  This report was finalized on 3/3/2022 8:56 AM by Dr. Markell Briscoe MD.      XR Chest 1 View    Result Date: 3/3/2022  1. Esophagogastric tube below the diaphragm. 2. Clear lungs.  This report was finalized on 3/3/2022 8:10 AM by Rudi Ochoa MD.      CT Head Without Contrast Stroke Protocol    Result Date: 3/2/2022    1.  No evidence of intracranial hemorrhage. 2.  Patchy areas of low-attenuation within the left parietal lobe cortex compatible with age-indeterminate infarct. 3.  Small area of encephalomalacia involving the posterior left parietal lobe compatible with old infarct.  4.  Findings personally communicated to  the stroke navigator at 1:47 PM on 3/2/2022   This report was finalized on 3/2/2022 1:56 PM by Vignesh Scott MD.      XR Abdomen KUB    Result Date: 3/2/2022  Feeding tube redundantly coiled in the stomach.    This report was finalized on 3/2/2022 10:24 PM by Dr. Markell Briscoe MD.      CT Angiogram Head w AI Analysis of LVO    Result Date: 3/2/2022   1.  Occlusion of the inferior division of the M2 segment of the left middle cerebral artery. 2.  No other intracranial mass or stenosis or occlusion. 3.  No evidence of carotid or vertebral artery stenosis. Findings personally called to Sobia of the stroke team at 2:16 PM on 3/2/2022  This report was finalized on 3/2/2022 2:25 PM by Vignesh Scott MD.      CT CEREBRAL PERFUSION WITH & WITHOUT CONTRAST    Result Date: 3/2/2022    1.  Abnormal increased mean transit time and time to peak involving the left temporal and left parietal lobes compatible with ischemia.  No blood flow less than 30% to suggest core infarct at this time. 2.  Findings communicated to Sobia of this stroke team at 2:10 PM on 3/2/2022  This report was finalized on 3/2/2022 2:14 PM by Vignesh Scott MD.      Invasive peripheral vascular study    Result Date: 3/3/2022   TICI 3 thrombectomy of an acute left M2 occlusion.  Additionally, successful vasospasm treatment of the cervical segment of the ICA with intra-arterial verapamil.  No complications noted.               Assessment/Plan     Assessment/Plan:      Acute ischemic stroke, left MCA, left inferior division occlusion s/p MT s/p TICI3    likely etiology cardio embolism vs atheroembolism    -discontinue heparin  -continue full dose of aspirin  -stroke work up pending.( TCD with bubble, LINDA tomorrow- left atrial appendage thrombus)   -PT/OT/Speech continue to work with patient.   -stable to be transferred to the floor.             Hypertension-normalize blood pressure goals   Type 2 diabetes-strict glycemic control, goal less than 7    Hyperlipidemia- LDL goal less than < 70. Start high intensity statin    Neurology will continue to follow the patient.     This was a high complex patient and nature of presentation was acute necessitating evaluation for life/limb saving condition. The test results were discussed with the patient/ family and with admitting/primary physician taking care in the hospital and recommended further diagnostics and management plans in a collaborative fashion.         Jose Eduardo Garcia MD  03/03/22  15:07 EST

## 2022-03-03 NOTE — THERAPY EVALUATION
Acute Care - Speech Language Pathology Initial Evaluation  [facility]      Cognitive-Communication Evaluation    Patient Name: Maryana Luevano  : 1960  MRN: [id number]  Today's Date: 3/3/2022               Admit Date: 3/2/2022     Visit Dx:    ICD-10-CM ICD-9-CM   1. Oropharyngeal dysphagia  R13.12 787.22     Patient Active Problem List   Diagnosis    AIS    Hypertension    Dyslipidemia    T2DM     Seizure disorder (on Trileptal)    Depression     Past Medical History:   Diagnosis Date    Depression 3/2/2022    Dyslipidemia 3/2/2022    Hypertension 3/2/2022    Seizure disorder (on Trileptal) 3/2/2022    T2DM  3/2/2022     Past Surgical History:   Procedure Laterality Date    INTERVENTIONAL RADIOLOGY PROCEDURE Bilateral 3/2/2022    Procedure: CAROTID CEREBRAL ANGIOGRAM BILATERAL;  Surgeon: Hamilton Nelson MD;  Location: [facility] INVASIVE LOCATION;  Service: Interventional Radiology;  Laterality: Bilateral;       SLP Recommendation and Plan  SLP Diagnosis: Pt presents with severe aphasia. Receptive language appears to be more intact than expressive. Gropping; consistient with Apraxia observed. Cognitive status could not be fully assessed 2/2 severe language deficits. (22 1350)        [facility] [place] Interventions Met: yes (22 1350)  Anticipated Discharge Disposition (SLP): unknown, anticipate therapy at next level of care (22 1350)        Predicted Duration Therapy Intervention (Days): until discharge (22 1350)                           SLP EVALUATION (last 72 hours)       SLP SLC Evaluation       Row Name 22       Communication Assessment/Intervention    Document Type evaluation  -AKSHAT (r) CARON (t) CJ (c)    Subjective Information no complaints  -CJ (r) CARON (t) CJ (c)    Patient Observations alert; cooperative  -AKSHAT (r) CARON (t) CJ (c)    Patient/Family/Caregiver Comments/Observations Niece present  -AKSHAT (r) CARON (t) CJ (c)    Patient Effort good  -AKSHAT (r) CARON (t) CJ  (c)    Symptoms Noted During/After Treatment none  -CJ (r) MH (t) CJ (c)            General Information    Patient Profile Reviewed yes  -CJ (r) MH (t) CJ (c)    Pertinent History Of Current Problem See previous eval  -CJ (r) MH (t) CJ (c)    Precautions/Limitations, Vision other (see comments)  Right field cut  -CJ (r) MH (t) CJ (c)    Precautions/Limitations, Hearing WFL; for purposes of eval  -CJ (r) MH (t) CJ (c)    Prior Level of Function-Communication unknown  -CJ (r) MH (t) CJ (c)    Plans/Goals Discussed with patient; agreed upon; other (see comments)  Niece  -CJ (r) MH (t) CJ (c)    Barriers to Rehab medically complex; cognitive status  -CJ (r) MH (t) CJ (c)    Patient's Goals for Discharge patient did not state  -CJ (r) MH (t) CJ (c)    Family Goals for Discharge family did not state  -CJ (r) MH (t) CJ (c)            Pain    Additional Documentation Pain Scale: FACES Pre/Post-Treatment (Group)  -CJ (r) MH (t) CJ (c)            Pain Scale: FACES Pre/Post-Treatment    Pain: FACES Scale, Pretreatment 0-->no hurt  -CJ (r) MH (t) CJ (c)    Posttreatment Pain Rating 0-->no hurt  -CJ (r) MH (t) CJ (c)            Comprehension Assessment/Intervention    Comprehension Assessment/Intervention Auditory Comprehension; Reading Comprehension  -CJ (r) MH (t) CJ (c)            Auditory Comprehension Assessment/Intervention    Auditory Comprehension (Communication) moderate impairment  -CJ (r) MH (t) CJ (c)    Able to Identify Objects/Pictures (Communication) body part; familiar objects; moderate impairment  -CJ (r) MH (t) CJ (c)    Answers Questions (Communication) yes/no; wh questions; personal; simple; moderate impairment; severe impairment  -CJ (r) MH (t) CJ (c)    Able to Follow Commands (Communication) 1-step; moderate impairment  -CJ (r) MH (t) CJ (c)    Narrative Discourse unable/difficult to assess  -CJ (r) MH (t) CJ (c)            Reading Comprehension Assessment/Intervention    Reading Comprehension  (Communication) unable/difficult to assess  -CJ (r) MH (t) CJ (c)            Expression Assessment/Intervention    Expression Assessment/Intervention verbal expression; graphic expression  -CJ (r) MH (t) CJ (c)            Verbal Expression Assessment/Intervention    Verbal Expression severe impairment  -CJ (r) MH (t) CJ (c)    Automatic Speech (Communication) counting 1-20; days of week; severe impairment  Perseveration w/ counting 1-5  -CJ (r) MH (t) CJ (c)    Repetition sounds; words; severe impairment; perseverations  -CJ (r) MH (t) CJ (c)    Phrase Completion unable/difficult to assess  -CJ (r) MH (t) CJ (c)    Responsive Naming unable/difficult to assess  -CJ (r) MH (t) CJ (c)    Confrontational Naming high frequency; severe impairment  -CJ (r) MH (t) CJ (c)    Spontaneous/Functional Words simple; severe impairment  -CJ (r) MH (t) CJ (c)    Sentence Formulation unable/difficult to assess  -CJ (r) MH (t) CJ (c)    Conversational Discourse/Fluency unable/difficult to assess  -CJ (r) MH (t) CJ (c)            Graphic Expression Assessment/Intervention    Graphic Expression unable/difficult to assess  Writing deficts as of last PM per neice  -CJ (r) MH (t) CJ (c)            Motor Speech Assessment/Intervention    Motor Speech Function severe impairment  -CJ (r) MH (t) CJ (c)    Characteristics Consistent with Apraxia groping  -CJ (r) MH (t) CJ (c)    Automatic Speech (Communication) counting 1-20; days of week; severe impairment  -CJ (r) MH (t) CJ (c)    Verbal Repetition (Communication) polysyllabic words; severe impairment  -CJ (r) MH (t) CJ (c)    Phase Completion unable/difficult to assess  -CJ (r) MH (t) CJ (c)    Conversational Speech (Communication) simple; severe impairment  -CJ (r) MH (t) CJ (c)            Cognitive Assessment Intervention- SLP    Cognitive Function (Cognition) unable/difficult to assess  Could not fully assess 2/2 severe expressive/receptive deficits  -CJ (r) MH (t) CJ (c)            SLP  Evaluation Clinical Impressions    SLP Diagnosis Pt presents with severe aphasia. Receptive language appears to be more intact than expressive. Gropping; consistient with Apraxia observed. Cognitive status could not be fully assessed 2/2 severe language deficits.  -AKSHAT (r) CARON (anne) AKSHAT (melchor)    Rehab Potential/Prognosis good  -AKSHAT (r) CARON (anne) AKSHAT (c)    SLC Criteria for Skilled Therapy Interventions Met yes  -AKSHAT (r) CARON (anne) AKSHAT (c)    Functional Impact difficulty communicating wants, needs; difficulty communicating in an emergency  -AKSHAT (r) CARON (anne) AKSHAT (c)            Recommendations    Therapy Frequency (SLP SLC) 5 days per week  -KASHAT (r) CARON (anne) AKSHAT (c)    Predicted Duration Therapy Intervention (Days) until discharge  -AKSHAT (r) CARON (anne) AKSHAT (c)    Anticipated Discharge Disposition (SLP) unknown; anticipate therapy at next level of care  -AKSHAT Triplettr) CARON (anne) AKSHAT (c)              User Key  (r) = Recorded By, (t) = Taken By, (c) = Cosigned By      Initials Name Effective Dates    Linnea Gutierrez, MS CCC-SLP 06/16/21 -     Claudia Cat, Speech Therapy Student 01/24/22 -                        EDUCATION  The patient has been educated in the following areas:     Communication Impairment.           SLP GOALS       Row Name 03/03/22 7756          Oral Nutrition/Hydration Goal 1 (SLP)    Oral Nutrition/Hydration Goal 1, SLP --     Time Frame (Oral Nutrition/Hydration Goal 1, SLP) --            Oral Nutrition/Hydration Goal 2 (SLP)    Oral Nutrition/Hydration Goal 2, SLP --     Time Frame (Oral Nutrition/Hydration Goal 2, SLP) --            Oral Nutrition/Hydration Goal (SLP)    Oral Nutrition/Hydration Goal, SLP --     Time Frame (Oral Nutrition/Hydration Goal, SLP) --            Labial Strengthening Goal 1 (SLP)    Activity (Labial Strengthening Goal 1, SLP) --     Increase Labial Tone --     [place]/Accuracy (Labial Strengthening Goal 1, SLP) --     Time Frame (Labial Strengthening Goal 1, SLP) --            Lingual Strengthening  Goal 1 (SLP)    Activity (Lingual Strengthening Goal 1, SLP) --     Increase Lingual Tone/Sensation/Control/Coordination/Movement --     Increase Tongue Back Strength --     [place]/Accuracy (Lingual Strengthening Goal 1, SLP) --     Time Frame (Lingual Strengthening Goal 1, SLP) --            Pharyngeal Strengthening Exercise Goal 1 (SLP)    Activity (Pharyngeal Strengthening Goal 1, SLP) --     Increase Timing --     Increase Superior Movement of the Hyolaryngeal Complex --     Increase Anterior Movement of the Hyolaryngeal Complex --     Increase Closure at Entrance to Airway/Closure of Airway at Glottis --     Increase Squeeze/Positive Pressure Generation --     [place]/Accuracy (Pharyngeal Strengthening Goal 1, SLP) --     Time Frame (Pharyngeal Strengthening Goal 1, SLP) --            Swallow Compensatory Strategies Goal 1 (SLP)    Activity (Swallow Compensatory Strategies/Techniques Goal 1, SLP) --     [place]/Accuracy (Swallow Compensatory Strategies/Techniques Goal 1, SLP) --     Time Frame (Swallow Compensatory Strategies/Techniques Goal 1, SLP) --            Words/Phrases/Sentences Goal 1 (SLP)    Improve Ability to Comprehend Words/Phrases/Sentences Through: Goal 1 (SLP) identify body part; identify familiar objects; identified by name; 80%; with moderate cues (50-74%)  -CJ (r) MH (t) CJ (c)     Time Frame (Identify Objects and Pictures Goal 1, SLP) short term goal (STG)  -CJ (r) MH (t) CJ (c)            Comprehend Questions Goal 1 (SLP)    Improve Ability to Comprehend Questions Goal 1 (SLP) questions about personal information; 80%; with moderate cues (50-74%)  -CJ (r) MH (t) CJ (c)     Time Frame (Comprehend Questions Goal 1, SLP) short term goal (STG)  -CJ (r) MH (t) CJ (c)            Word Retrieval Skills Goal 1 (SLP)    Improve Word Retrieval Skills By Goal 1 (SLP) completing automatic speech task, counting; completing automatic speech task, alphabet; completing automatic speech  task, days of the week; completing automatic speech task, months; confrontational naming task; 80%; with moderate cues (50-74%)  -CJ (r) MH (t) CJ (c)     Time Frame (Word Retrieval Goal 1, SLP) short term goal (STG)  -CJ (r) MH (t) CJ (c)            Motor Planning Goal 1 (SLP)    Improve Motor Planning to Reduce Apraxia by Goal 1 (SLP) imitating mouth postures; imitating vowels; following isolated oral commands; 80%; with moderate cues (50-74%)  -CJ (r) MH (t) CJ (c)     Time Frame (Motor Planning Goal 1, SLP) short term goal (STG)  -CJ (r) MH (t) CJ (c)            Additional Goal (SLP)    Additional Goal, SLP LTG: Pt will improve language skills to functionally communicate her wants and needs to be able to participate in care.  -CJ (r) MH (t) CJ (c)     Time Frame (Additional Goal, SLP) by discharge  -CJ (r) MH (t) CJ (c)               User Key  (r) = Recorded By, (t) = Taken By, (c) = Cosigned By      Initials Name Provider Type    Linnea Gutierrez MS CCC-SLP Speech and Language Pathologist    Claudia Cat, Speech Therapy Student Speech Therapy Student                             Time Calculation:      Time Calculation- SLP       Row Name 03/03/22 1442 03/03/22 1405          Time Calculation- SLP    SLP Start Time 1350 (P)   - 1100  -     SLP Received On 03/03/22 (P)   - 03/03/22  -            Untimed Charges    52009-PR Eval Speech and Production w/ Language Minutes 50 (P)   - --     [id number] Eval Oral Pharyng Swallow Minutes -- 40  -     [id number] Fiberoptic Endo Eval Swallow Minutes -- 68  -            Total Minutes    Untimed Charges Total Minutes 50 (P)   - 108  -CJ      Total Minutes 50 (P)   - 108  -               User Key  (r) = Recorded By, (t) = Taken By, (c) = Cosigned By      Initials Name Provider Type    Linnea Gutierrez MS CCC-SLP Speech and Language Pathologist    Claudia Cat, Speech Therapy Student Speech Therapy Student                    Therapy  Charges for Today       Code Description Service Date Service Provider Modifiers Qty    [id number] HC ST EVAL SPEECH AND PROD W LANG  3 3/3/2022 Claudia Morse, Speech Therapy Student GN 1                  Patient was not wearing a face mask and did not exhibit coughing during this therapy encounter.  Procedure performed was not aerosolizing, did not involve close contact (within 6 feet for at least 15 minutes or longer), and did not involve contact with infectious secretions or specimens.  Therapist used appropriate personal protective equipment including gloves, standard procedure mask and eye protection.  Appropriate PPE was worn during the entire therapy session.  Hand hygiene was completed before and after therapy session.  ABDOUL Eng was also present during this encounter and the aforementioned applies to them, as well.         Claudia Morse, Speech Therapy Student  3/3/2022

## 2022-03-03 NOTE — CASE MANAGEMENT/SOCIAL WORK
Discharge Planning Assessment  [facility]     Patient Name: Maryana Luevano  MRN: [id number]  Today's Date: 3/3/2022    Admit Date: 3/2/2022     Discharge Needs Assessment     Row Name 03/03/22 1140       Living Environment    Lives With grandchild(kareem)    Current Living Arrangements home/apartment/condo  Lives in [place]    Primary Care Provided by self    Provides Primary Care For grandchild(kareem)    Caregiving Concerns Has twin grandchildren who are 17 and a 3 year old great grandchild she is raising.    Family Caregiver if Needed none    Quality of Family Relationships involved; supportive    Able to Return to Prior Arrangements other (see comments)       Resource/Environmental Concerns    Resource/Environmental Concerns none    Transportation Concerns car, none       Transition Planning    Patient/Family Anticipates Transition to inpatient rehabilitation facility; home with help/services    Patient/Family Anticipated Services at Transition ; rehabilitation services    Transportation Anticipated family or friend will provide       Discharge Needs Assessment    Equipment Currently Used at Home none    Concerns to be Addressed discharge planning; adjustment to diagnosis/illness    Anticipated Changes Related to Illness inability to care for someone else    Equipment Needed After Discharge none    Discharge Facility/Level of Care Needs acute rehab    Current Discharge Risk chronically ill               Discharge Plan     Row Name 03/03/22 1142       Plan    Plan Rehab vs outpatient therapy    Patient/Family in Agreement with Plan yes    Plan Comments Pt very aphasic, called her niece Laura Collett (who lives in [place]). Pt does not have any living children or . Pt's niece states she is her poa and will try to find the paperwork to bring to the hospital. Pt is raising her 2 grandchildren (twins), age 17 and a great grandchild that will soon be 4 in [facility]. The mother of the 4 year old has her  currently. Pt's has [facility] medicaid on file here, but her niece thinks she has medicare since she is retired and will verify once she arrives today. PT recommended inpatient rehab, pt walked 60 ft. Will discuss with pt. CM will follow. Avis ext. [phone]              Continued Care and Services - Admitted Since 3/2/2022    Coordination has not been started for this encounter.       Expected Discharge Date and Time     Expected Discharge Date Expected Discharge Time    Mar 7, 2022          Demographic Summary     Row Name 03/03/22 1138       General Information    Referral Source admission list    Preferred Language English     Used During This Interaction no    General Information Comments PCP is Son Choi in [place]. Laura Collett-niece is POA, will attempt to find paperwork and bring to hospital.       Contact Information    Permission Granted to Share Info With                Functional Status     Row Name 03/03/22 1139       Functional Status    Usual Activity Tolerance good    Current Activity Tolerance good       Functional Status, IADL    Medications independent    Meal Preparation independent    Housekeeping independent    Laundry independent    Shopping independent       Employment/    Employment Status retired    Employment/ Comments Has formerly [employer] Medicaid, thinks she has Medicare, will confirm when niece gets to hospital. Pt very aphasic and difficult to understand.               Psychosocial    No documentation.                Abuse/Neglect    No documentation.                Legal    No documentation.                Substance Abuse    No documentation.                Patient Forms    No documentation.                   Avis Gaspar, LAURA

## 2022-03-03 NOTE — PROGRESS NOTES
"                                              Clinical Nutrition     Nutrition Assessment  Reason for Visit:   MDR, Difficulty chewing/swallowing      Patient Name: Maryana Luevano  YOB: 1960  MRN: [id number]  Date of Encounter: 03/03/22 15:40 EST  Admission date: 3/2/2022      Comments:  Oral intake at lunch reported at 100% of dysphagia level 4 diet. Nutrition education for DM when appropriate - Hgb A1C = 11.0% on adm.      Assessment      Admission Diagnosis    Acute CVA (cerebrovascular accident) (HCC) [I63.9]     Hospital Problem List    AIS    Hypertension    Dyslipidemia    T2DM     Seizure disorder (on Trileptal)    Depression        Applicable Interval History:  3/2  s/p Mechanical thrombectomy    Applicable PMH/PSxH:     PMH: She  has a past medical history of Depression (3/2/2022), Dyslipidemia (3/2/2022), Hypertension (3/2/2022), Seizure disorder (on Trileptal) (3/2/2022), and T2DM  (3/2/2022).   PSxH: She  has a past surgical history that includes Interventional radiology procedure (Bilateral, 3/2/2022).       Diet/Nutrition Related History:   RN's reports pt very unhappy w/ corpak tube placement but anti-seizure med had to be given.  SLP to do FEES evaluation this morning.  Pt remains almost mute able to say or 2 words. But tries to say more.    Pt niece reports she is not a picky eater eats most everything. She reports she ate all of her lunch today.       Anthropometrics     Admission Height 167.6 cm (66\") Documented at 03/02/2022 1242   Admission Weight 77.1 kg (170 lb) Documented at 03/02/2022 1242     Last filed wt: Weight: 77.1 kg (169 lb 15.6 oz) (03/03/22 1314)  Weight Method: Stated    BMI: BMI (Calculated): 27.4  Overweight: 25.0-29.9kg/m2     Ideal Body Weight (IBW) (kg): 59.54  Adjustments:    Weight Change   UBW:  Weight change:   % wt change:   Time frame of weight loss:     Labs reviewed   Yes  Pertinent: Elev BG, Hgb A1C, low sodium?  r/t hyperglycemia    Results from last 7 " days   Lab Units 03/03/22  0712 03/02/22  1339 03/02/22  1337   SODIUM mmol/L 130*  --  135*   POTASSIUM mmol/L 3.5  --  4.2   CHLORIDE mmol/L 95*  --  98   CO2 mmol/L 23.0  --  23.0   BUN mg/dL 8  --  9   CREATININE mg/dL 0.51* 0.50* 0.63   GLUCOSE mg/dL 214*  --  249*   CALCIUM mg/dL 9.5  --  9.8   PHOSPHORUS mg/dL 3.1  --   --    MAGNESIUM mg/dL 1.8  --   --    CHOLESTEROL mg/dL 268*  --   --    TRIGLYCERIDES mg/dL 187*  --   --      Results from last 7 days   Lab Units 03/03/22  0712 03/02/22  1855 03/02/22  1337   WBC 10*3/mm3 6.95 9.00 6.22   CHOLESTEROL mg/dL 268*  --   --    TRIGLYCERIDES mg/dL 187*  --   --        Lab Results   Lab Value Date/Time    HGBA1C 11.00 (H) 03/03/2022 0712       Medications reviewed   Yes  Pertinent: ASA, insulin, trileptal  Infusion: cardene    Intake & Ouptut  Past 24 hrs  reviewed     Intake & Output (last day)       03/02 0701 03/03 0700 03/03 0701 03/04 0700    I.V. (mL/kg) 490.9 (6.4) 191 (2.5)    Other 40     Total Intake(mL/kg) 530.9 (6.9) 191 (2.5)    Urine (mL/kg/hr) 900 1100 (1.6)    Total Output 900 1100    Net -369.2 -909          Urine Unmeasured Occurrence  1 x         Behind   1.2 L since adm  Needs Assessment     Height used:   Weight used:   IBW:     Estimated Energy needs:    25 kcal/kg    Estimated Protein needs:    1.0 gm/kg    Estimated Fluid needs:   30 ml/kg        Current Nutrition Prescription     PO: Diet Dysphagia; IV - Mechanical Soft No Mixed Consistencies; Nectar / Syrup Thick; Cardiac, Consistent Carbohydrate  ONS:No active supplement orders      Evaluation of Received Nutrient/Fluid Intake:     Oral Intake:  Days evaluated:  Insufficient data  Meals reviewed :  1 reported  Average Intake %:  100%      Nutrition Diagnosis     3/3  Problem Food and nutrition knowledge deficit   Etiology  DM mgt   Signs/Symptoms Hgb A1C=11.0%, elevated BG on adm   Status: ongoing, diabetes educator consulted    3/3  Problem Swallowing difficulty   Etiology stroke    Signs/Symptoms SLP FEES evaluation 3/3 mild-moderate oral-pharyngeal dysphagia   Status: improved : modified texture and consistency diet ordered    Goal:   General: Nutrition to support treatment, Improve nutrition related labs, Provide information regarding MNT therapy  PO: Tolerate PO, Meet estimated needs, Modify texture/consistency    Additional goals:      Nutrition Intervention     Follow treatment progress, Care plan reviewed, Advise alternate selection, Interview for preferences, Await begin PO      Monitoring/Evaluation:   Per protocol, PO intake, Pertinent labs, Weight, Symptoms, POC/GOC, Swallow function, Provide education when appropriate        Robyn Stein MS,RD,LD,   Time Spent: 20 mins

## 2022-03-03 NOTE — PLAN OF CARE
Goal Outcome Evaluation:  Plan of Care Reviewed With: patient        Progress:  (initial eval)   SLP evaluation completed. Will continue to address dysphagia. FEES completed, initiated on dysphagia level IV w/ nectar thick liquids. Please see note for further details and recommendations.

## 2022-03-03 NOTE — CONSULTS
Diabetes Education    Patient Name:  Maryana Luevano  YOB: 1960  MRN: [id number]  Admit Date:  3/2/2022        Consult rec'd per stroke protocol; chart reviewed. Noted pt with current CGS of 12. Will cont to follow and reassess for appropriate time for education. Thank you for this consult.      Electronically signed by:  Bambi Castro RN  03/03/22 09:31 EST

## 2022-03-03 NOTE — MBS/VFSS/FEES
Acute Care - Speech Language Pathology   Swallow Initial Evaluation [facility]   Clinical Swallow Evaluation  Fiberoptic Endoscopic Evaluation of Swallowing (FEES)       Patient Name: Maryana Luevano  : 1960  MRN: [id number]  Today's Date: 3/3/2022               Admit Date: 3/2/2022    Visit Dx:     ICD-10-CM ICD-9-CM   1. Oropharyngeal dysphagia  R13.12 787.22     Patient Active Problem List   Diagnosis   • AIS   • Hypertension   • Dyslipidemia   • T2DM    • Seizure disorder (on Trileptal)   • Depression     Past Medical History:   Diagnosis Date   • Depression 3/2/2022   • Dyslipidemia 3/2/2022   • Hypertension 3/2/2022   • Seizure disorder (on Trileptal) 3/2/2022   • T2DM  3/2/2022     Past Surgical History:   Procedure Laterality Date   • INTERVENTIONAL RADIOLOGY PROCEDURE Bilateral 3/2/2022    Procedure: CAROTID CEREBRAL ANGIOGRAM BILATERAL;  Surgeon: Hamilton Nelson MD;  Location: [facility] INVASIVE LOCATION;  Service: Interventional Radiology;  Laterality: Bilateral;       SLP Recommendation and Plan  SLP Swallowing Diagnosis: mild-moderate, oral dysphagia, pharyngeal dysphagia (22 1100)  SLP Diet Recommendation: mechanical soft with no mixed consistencies, nectar thick liquids (22 1100)  Recommended Precautions and Strategies: upright posture during/after eating, small bites of food and sips of liquid, general aspiration precautions, reflux precautions, check mouth frequently for oral residue/pocketing (22 1100)  SLP Rec. for Method of Medication Administration: meds whole, meds crushed, with pudding or applesauce, as tolerated (22 1100)     Monitor for Signs of Aspiration: yes, notify SLP if any concerns (22 1100)  Recommended Diagnostics: FEES (22 0900)  Swallow Criteria for Skilled Therapeutic Interventions Met: demonstrates skilled criteria (22 1100)  Anticipated Discharge Disposition (SLP): unknown, anticipate therapy at next level of care (22  1100)  Rehab Potential/Prognosis, Swallowing: good, to achieve stated therapy goals (03/03/22 1100)  Therapy Frequency (Swallow): 5 days per week (03/03/22 1100)                                     Plan of Care Reviewed With: patient  Progress:  (initial eval)      SWALLOW EVALUATION (last 72 hours)     SLP Adult Swallow Evaluation     Row Name 03/03/22 1100 03/03/22 0900       Rehab Evaluation    Document Type evaluation  -CJ evaluation  -CJ    Subjective Information no complaints  -CJ no complaints  -CJ    Patient Observations alert; cooperative  -CJ alert; cooperative  -CJ    Patient/Family/Caregiver Comments/Observations no family present  -CJ no family present  -CJ    Patient Effort good  -CJ good  -CJ    Symptoms Noted During/After Treatment none  -CJ none  -CJ             General Information    Patient Profile Reviewed yes  -CJ yes  -CJ    Pertinent History Of Current Problem see am eval; referred for FEES  -CJ 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 Failed RN dysphagia screen  -CJ    Current Method of Nutrition NPO; nasogastric feedings  -CJ NPO; nasogastric feedings  -CJ    Precautions/Limitations, Vision other (see comments)  -CJ other (see comments)  field cut Right  -CJ    Precautions/Limitations, Hearing WFL; for purposes of eval  -CJ WFL; for purposes of eval  -CJ    Prior Level of Function-Communication WFL  -CJ WFL  -CJ    Prior Level of Function-Swallowing no diet consistency restrictions  -CJ no diet consistency restrictions  -CJ    Plans/Goals Discussed with patient; agreed upon  -CJ patient; agreed upon  -CJ    Barriers to Rehab none identified  -CJ none identified  -    Patient's Goals for Discharge patient did not state  -CJ patient could not state  -CJ             Pain    Additional Documentation -- Pain Scale: FACES Pre/Post-Treatment (Group)  -             Pain Scale: FACES Pre/Post-Treatment    Pain: FACES Scale, Pretreatment 0-->no hurt  -CJ  0-->no hurt  -    Posttreatment Pain Rating 0-->no hurt  -CJ 0-->no hurt  -             Oral Motor Structure and Function    Dentition Assessment -- natural, present and adequate  -    Secretion Management -- WNL/WFL  -    Mucosal Quality -- moist, healthy  -             Oral Musculature and Cranial Nerve Assessment    Oral Motor General Assessment -- oral labial or buccal impairment; lingual impairment  -    Oral Labial or Buccal Impairment, Detail, Cranial Nerve VII (Facial): -- right labial droop  -CJ    Lingual Impairment, Detail. Cranial Nerves IX, XII (Glossopharyngeal and Hypoglossal) -- reduced strength right  -             General Eating/Swallowing Observations    Respiratory Support Currently in Use -- room air  -    Eating/Swallowing Skills -- fed by SLP  -    Positioning During Eating -- upright 90 degree; upright in bed  -    Utensils Used -- spoon; cup; straw  -    Consistencies Trialed -- regular textures; thin liquids; ice chips; pureed  -             Clinical Swallow Eval    Pharyngeal Phase -- suspected pharyngeal impairment  -    Clinical Swallow Evaluation Summary -- CSE completed this am. Mrs. Luevano is positioned upright and centered in bed to accept po presentations. Overt s/s of aspiration w/ thin liquids w/ hard cough response elicited. Intermittent multiple swallows w/ puree presentations. Recommend continue NPO until FEES later this am.  -             Pharyngeal Phase Concerns    Pharyngeal Phase Concerns -- cough; multiple swallows  -    Multiple Swallows -- pudding  -    Cough -- thin  -CJ             Fiberoptic Endoscopic Evaluation of Swallowing (FEES)    Risks/Benefits Reviewed risks/benefits explained; patient; agreed to eval  - --    Nasal Entry left:  -CJ --    Scope serial number/identification [id number]  - --    Special Considerations keofeed  - --             Anatomy and Physiology    Anatomic Considerations no anatomic structural deviation  - --     Velopharyngeal WFL  -CJ --    Base of Tongue symmetrical  -CJ --    Epiglottis WFL  -CJ --    Laryngeal Function Breathing symmetrical  -CJ --    Laryngeal Function Phonation symmetrical  -CJ --    Laryngeal Function to Breath Hold TVF contact; sustained closure  -CJ --    Secretion Rating Scale (Benito et al. 1996) 1- secretions present around the laryngeal vestibule  -CJ --    Secretion Description thin; clear  -CJ --    Ice Chips elicited swallow; partially cleared secretions  -CJ --    Spontaneous Swallow frequency reduced  -CJ --    Sensory sensed scope  -CJ --    Consistencies Trialed ice chips; thin liquids; nectar-thick liquids; pudding/puree; regular textures; spoon; cup; straw  -CJ --             FEES Interpretation    Oral Phase prolonged manipulation; anterior loss; prespill of liquids into pharynx  -CJ --             Initiation of Pharyngeal Swallow    Initiation of Pharyngeal Swallow bolus in pyriform sinuses  -CJ --    Pharyngeal Phase impaired pharyngeal phase of swallowing  -CJ --    Aspiration During the Swallow thin liquids; secondary to delayed swallow initiation or mistiming; secondary to reduced laryngeal elevation; secondary to reduced vestibular closure  -CJ --    Aspiration After the Swallow thin liquids; all consistencies tested; in laryngeal vestibule; in pyriform sinuses  -CJ --    Response to Penetration deep; no response  -CJ --    Response to Aspiration no response, silent aspiration; response with cue only; could not clear subglottic material  -CJ --    Rosenbek's Scale thin:; 7-->Level 7; nectar:; pudding/puree:; regular textures:; 1-->Level 1  -CJ --    Residue thin liquids; secondary to reduced posterior pharyngeal wall stripping; secondary to reduced laryngeal elevation; secondary to reduced hyolaryngeal excursion  -CJ --    Response to Residue other (see comments)  partially cleared  -CJ --    Attempted Compensatory Maneuvers bolus presentation style; bolus size; additional  subsequent swallow; multiple swallows; throat clear after swallow  -CJ --    Response to Attempted Compensatory Maneuvers did not prevent penetration; did not prevent aspiration; reduced residue  -CJ --    FEES Summary FEES completed this am. Mrs. Luevano is positioned upright and centered in bedside chair to accept po presentations of puree, solid cracker thin and nectar thic liquids. Oral phase is mild-moderately impaired w/ anterior loss w/ thin liquids. Prolonged manipulation w/ solids. Prespill w/ thin liquids to the pyriforms. Silent aspiration w/ thin liquids occurring during the swallow. Continued aspiration of thin liquid residue after the swallow. Cued cough ineffective to clear. No penetration or aspiration evidenced w/ puree solid or nectar thick liquids even when pushed. Per this evaluation Mrs. Luevano presents w/a mild-moderate oropharyngeal dysphagia. Will most benefit from modified po diet of dysphagia level IV w/ nectar thick liquids. Meds whole in puree. SLP to f/u for dysphagia tx  -CJ --             SLP Evaluation Clinical Impression    SLP Swallowing Diagnosis mild-moderate; oral dysphagia; pharyngeal dysphagia  -CJ suspected pharyngeal dysphagia  -CJ    Functional Impact risk of aspiration/pneumonia  -CJ risk of aspiration/pneumonia  -    Rehab Potential/Prognosis, Swallowing good, to achieve stated therapy goals  -CJ good, to achieve stated therapy goals  -    Swallow Criteria for Skilled Therapeutic Interventions Met demonstrates skilled criteria  -CJ demonstrates skilled criteria  -             Recommendations    Therapy Frequency (Swallow) 5 days per week  -CJ --    SLP Diet Recommendation mechanical soft with no mixed consistencies; nectar thick liquids  -CJ NPO  -CJ    Recommended Diagnostics -- FEES  -CJ    Recommended Precautions and Strategies upright posture during/after eating; small bites of food and sips of liquid; general aspiration precautions; reflux precautions; check mouth  frequently for oral residue/pocketing  - general aspiration precautions  -    Oral Care Recommendations Oral Care BID/PRN  -CJ Oral Care BID/PRN  -CJ    SLP Rec. for Method of Medication Administration meds whole; meds crushed; with pudding or applesauce; as tolerated  - meds via alternate route  -    Monitor for Signs of Aspiration yes; notify SLP if any concerns  -CJ yes; notify SLP if any concerns  -CJ    Anticipated Discharge Disposition (SLP) unknown; anticipate therapy at next level of care  -CJ unknown; anticipate therapy at next level of care  -          User Key  (r) = Recorded By, (t) = Taken By, (c) = Cosigned By    Initials Name Effective Dates    Linnea Gutierrez, MS CCC-SLP 06/16/21 -                 EDUCATION  The patient has been educated in the following areas:   Dysphagia (Swallowing Impairment) Oral Care/Hydration Modified Diet Instruction.        SLP GOALS     Row Name 03/03/22 1350 03/03/22 1100          Oral Nutrition/Hydration Goal 1 (SLP)    Oral Nutrition/Hydration Goal 1, SLP -- LTG: Pt will tolerate least restrictive level of po intake w/o overt s/s of aspiration/distress given no cues w/ 100% acc  -     Time Frame (Oral Nutrition/Hydration Goal 1, SLP) -- by discharge  -            Oral Nutrition/Hydration Goal 2 (SLP)    Oral Nutrition/Hydration Goal 2, SLP -- STG: Pt will accept trials of soft solids and nectar thick liquids w/o overt s/s of aspiration/distress given no cues w/ 100% acc  -     Time Frame (Oral Nutrition/Hydration Goal 2, SLP) -- short term goal (STG)  -            Oral Nutrition/Hydration Goal (SLP)    Oral Nutrition/Hydration Goal, SLP -- STG: Pt will accept therapeutic trials of ice chips/thin water w/o overt s/s of aspiraton/distress given no cues w/ 70% acc to determine pt readiness for repeat instrumental dysphagia evaluation  -     Time Frame (Oral Nutrition/Hydration Goal, SLP) -- short term goal (STG)  -            Labial Strengthening  Goal 1 (SLP)    Activity (Labial Strengthening Goal 1, SLP) -- increase labial tone  -CJ     Increase Labial Tone -- swallow trials; labial resistance exercises  -CJ     Atkinson/Accuracy (Labial Strengthening Goal 1, SLP) -- with moderate cues (50-74% accuracy)  -CJ     Time Frame (Labial Strengthening Goal 1, SLP) -- short term goal (STG)  -            Lingual Strengthening Goal 1 (SLP)    Activity (Lingual Strengthening Goal 1, SLP) -- increase lingual tone/sensation/control/coordination/movement; increase tongue back strength  -CJ     Increase Lingual Tone/Sensation/Control/Coordination/Movement -- swallow trials; lingual resistance exercises  -CJ     Increase Tongue Back Strength -- lingual resistance exercises  -CJ     Atkinson/Accuracy (Lingual Strengthening Goal 1, SLP) -- with moderate cues (50-74% accuracy)  -CJ     Time Frame (Lingual Strengthening Goal 1, SLP) -- short term goal (STG)  -CJ            Pharyngeal Strengthening Exercise Goal 1 (SLP)    Activity (Pharyngeal Strengthening Goal 1, SLP) -- increase timing; increase superior movement of the hyolaryngeal complex; increase anterior movement of the hyolaryngeal complex; increase squeeze/positive pressure generation; increase closure at entrance to airway/closure of airway at glottis  -CJ     Increase Timing -- prepping - 3 second prep or suck swallow or 3-step swallow  -CJ     Increase Superior Movement of the Hyolaryngeal Complex -- effortful pitch glide (falsetto + pharyngeal squeeze)  -CJ     Increase Anterior Movement of the Hyolaryngeal Complex -- chin tuck against resistance (CTAR)  -CJ     Increase Closure at Entrance to Airway/Closure of Airway at Glottis -- supraglottic swallow  -CJ     Increase Squeeze/Positive Pressure Generation -- hard effortful swallow  -CJ     Atkinson/Accuracy (Pharyngeal Strengthening Goal 1, SLP) -- with moderate cues (50-74% accuracy)  -CJ     Time Frame (Pharyngeal Strengthening Goal 1, SLP) --  short term goal (STG)  -CJ            Swallow Compensatory Strategies Goal 1 (SLP)    Activity (Swallow Compensatory Strategies/Techniques Goal 1, SLP) -- compensatory strategies; during meal intake; during p.o. trials; small bites; small cup sips; small straw sips; food/liquid placed on stronger left side  -CJ     Lee/Accuracy (Swallow Compensatory Strategies/Techniques Goal 1, SLP) -- with minimal cues (75-90% accuracy)  -CJ     Time Frame (Swallow Compensatory Strategies/Techniques Goal 1, SLP) -- short term goal (STG)  -CJ            Words/Phrases/Sentences Goal 1 (SLP)    Improve Ability to Comprehend Words/Phrases/Sentences Through: Goal 1 (SLP) identify body part; identify familiar objects; identified by name; 80%; with moderate cues (50-74%)  -CJ (r) MH (t) CJ (c) --     Time Frame (Identify Objects and Pictures Goal 1, SLP) short term goal (STG)  -CJ (r) MH (t) CJ (c) --            Comprehend Questions Goal 1 (SLP)    Improve Ability to Comprehend Questions Goal 1 (SLP) questions about personal information; 80%; with moderate cues (50-74%)  -CJ (r) MH (t) CJ (c) --     Time Frame (Comprehend Questions Goal 1, SLP) short term goal (STG)  -CJ (r) MH (t) CJ (c) --            Word Retrieval Skills Goal 1 (SLP)    Improve Word Retrieval Skills By Goal 1 (SLP) completing automatic speech task, counting; completing automatic speech task, alphabet; completing automatic speech task, days of the week; completing automatic speech task, months; confrontational naming task; 80%; with moderate cues (50-74%)  -CJ (r) MH (t) CJ (c) --     Time Frame (Word Retrieval Goal 1, SLP) short term goal (STG)  -CJ (r) MH (t) CJ (c) --            Motor Planning Goal 1 (SLP)    Improve Motor Planning to Reduce Apraxia by Goal 1 (SLP) imitating mouth postures; imitating vowels; following isolated oral commands; 80%; with moderate cues (50-74%)  -CJ (r) MH (t) CJ (c) --     Time Frame (Motor Planning Goal 1, SLP) short term  goal (STG)  -CJ (r) MH (t) CJ (c) --            Additional Goal (SLP)    Additional Goal, SLP LTG: Pt will improve language skills to functionally communicate her wants and needs to be able to participate in care.  -CJ (r) MH (t) CJ (c) --     Time Frame (Additional Goal, SLP) by discharge  -CJ (r) MH (t) CJ (c) --           User Key  (r) = Recorded By, (t) = Taken By, (c) = Cosigned By    Initials Name Provider Type    Linnea Gutierrez MS CCC-SLP Speech and Language Pathologist    Claudia Cat, Speech Therapy Student Speech Therapy Student                   Time Calculation:    Time Calculation- SLP     Row Name 03/03/22 1442 03/03/22 1405          Time Calculation- SLP    SLP Start Time 1350  -CJ (r) MH (t) CJ (c) 1100  -CJ     SLP Received On 03/03/22  -CJ (r) MH (t) CJ (c) 03/03/22  -CJ            Untimed Charges    53439-MK Eval Speech and Production w/ Language Minutes 50  -CJ (r)  (t) CJ (c) --     05581-TV Eval Oral Pharyng Swallow Minutes -- 40  -CJ     17184-RJ Fiberoptic Endo Eval Swallow Minutes -- 68  -CJ            Total Minutes    Untimed Charges Total Minutes 50  -CJ (r) MH (t) 108  -CJ      Total Minutes 50  -CJ (r) MH (t) 108  -CJ           User Key  (r) = Recorded By, (t) = Taken By, (c) = Cosigned By    Initials Name Provider Type    Linnea Gutierrez MS CCC-SLP Speech and Language Pathologist    Claudia Cat, Speech Therapy Student Speech Therapy Student                Therapy Charges for Today     Code Description Service Date Service Provider Modifiers Qty    55941886959 HC ST EVAL ORAL PHARYNG SWALLOW 3 3/3/2022 Linnea France MS CCC-SLP GN 1    [id number] HC ST FIBEROPTIC ENDO EVAL SWALL 5 3/3/2022 Linnea France MS CCC-SLP GN 1        Patient was not wearing a face mask and did exhibit coughing during this therapy encounter.  Procedure performed was aerosolizing, involved close contact (within 6 feet for at least 15 minutes or longer), and did not involve  contact with infectious secretions or specimens.  Therapist used appropriate personal protective equipment including gloves, standard procedure mask and eye protection.  Appropriate PPE was worn during the entire therapy session.  Hand hygiene was completed before and after therapy session.          Linnea France MS CCC-SLP  3/3/2022

## 2022-03-03 NOTE — PLAN OF CARE
Goal Outcome Evaluation:  Plan of Care Reviewed With: patient           Outcome Summary: OT eval complete. Pt presents w/ expressive aphasia, R coordination deficits, R visual field cut, and balance deficits limiting her ADL independence. Pt would benefit from continued skilled IPOT services to address current functional deficits. Recommend IRF at d/c.

## 2022-03-03 NOTE — THERAPY EVALUATION
Patient Name: Maryana Luevano  : 1960    MRN: [id number]                              Today's Date: 3/3/2022       Admit Date: 3/2/2022    Visit Dx: No diagnosis found.  Patient Active Problem List   Diagnosis   • AIS   • Hypertension   • Dyslipidemia   • T2DM    • Seizure disorder (on Trileptal)   • Depression     Past Medical History:   Diagnosis Date   • Depression 3/2/2022   • Dyslipidemia 3/2/2022   • Hypertension 3/2/2022   • Seizure disorder (on Trileptal) 3/2/2022   • T2DM  3/2/2022     No past surgical history on file.   General Information     Row Name 22 1004          OT Time and Intention    Document Type evaluation  -MA     Mode of Treatment occupational therapy  -MA     Row Name 22 1004          General Information    Patient Profile Reviewed yes  -MA     Prior Level of Function independent:; bed mobility; ADL's; transfer; all household mobility; community mobility  Pt limited d/t aphasia, per CR, pt I at baseline and is primary caregiver for her 2 grandchildren  -MA     Existing Precautions/Restrictions fall; other (see comments)  R coordination deficits, R visual field cut, expressive aphasia  -MA     Barriers to Rehab medically complex  -MA     Row Name 22 1004          Living Environment    Lives With grandchild(kareem)  -MA     Row Name 22 1004          Home Main Entrance    Number of Stairs, Main Entrance --  Unable to gather home info d/t pt aphasia, TBA further  -MA     Row Name 22 1004          Cognition    Orientation Status (Cognition) oriented to; person; place; time; verbal cues/prompts needed for orientation  Pt able to answer yes/no questions appropriately  -MA     Row Name 22 1004          Safety Issues, Functional Mobility    Safety Issues Affecting Function (Mobility) ability to follow commands; awareness of need for assistance; insight into deficits/self-awareness; safety precaution awareness; safety precautions follow-through/compliance;  sequencing abilities  -MA     Impairments Affecting Function (Mobility) balance; coordination; motor planning; sensation/sensory awareness; visual/perceptual  -MA           User Key  (r) = Recorded By, (t) = Taken By, (c) = Cosigned By    Initials Name Provider Type    Kailee Escobar OT Occupational Therapist                 Mobility/ADL's     Row Name 03/03/22 1007          Bed Mobility    Bed Mobility supine-sit  -MA     Supine-Sit [place] (Bed Mobility) supervision; verbal cues  -MA     Assistive Device (Bed Mobility) bed rails; head of bed elevated  -MA     Row Name 03/03/22 1007          Transfers    Transfers sit-stand transfer; toilet transfer; other (see comments)  -MA     Comment (Transfers) Pt CGA for STS, SPT from bed-to-BSC, [id number]. VC's for sequencing and safety.  -MA     Sit-Stand [place] (Transfers) contact guard; verbal cues  -MA     [place] Level (Toilet Transfer) contact guard; verbal cues  -MA     Assistive Device (Toilet Transfer) commode, bedside without drop arms  -MA     Row Name 03/03/22 1007          Toilet Transfer    Type (Toilet Transfer) stand pivot/stand step  -MA     Row Name 03/03/22 1007          Functional Mobility    Functional Mobility- Comment Deferred to PT  -MA     Row Name 03/03/22 1007          Activities of Daily Living    BADL Assessment/Intervention upper body dressing; lower body dressing; grooming; toileting  -MA     Row Name 03/03/22 1007          Upper Body Dressing Assessment/Training    [place] Level (Upper Body Dressing) doff; don; other (see comments); maximum assist (25% patient effort); verbal cues  hospital gown  -MA     Position (Upper Body Dressing) sitting up in bed  -MA     Row Name 03/03/22 1007          Lower Body Dressing Assessment/Training    [place] Level (Lower Body Dressing) don; socks; maximum assist (25% patient effort); verbal cues  -MA     Position (Lower Body Dressing) sitting up in bed  -MA     Row Name  03/03/22 1007          Grooming Assessment/Training    [place] Level (Grooming) wash face, hands; hair care, combing/brushing; minimum assist (75% patient effort); verbal cues  -MA     Position (Grooming) sitting up in bed  -MA     Row Name 03/03/22 1007          Toileting Assessment/Training    [place] Level (Toileting) adjust/manage clothing; perform perineal hygiene; supervision; verbal cues  -MA     Assistive Devices (Toileting) commode, bedside without drop arms  -MA     Position (Toileting) supported sitting  -MA           User Key  (r) = Recorded By, (t) = Taken By, (c) = Cosigned By    Initials Name Provider Type    Kailee Escobar OT Occupational Therapist               Obj/Interventions     Row Name 03/03/22 1009          Sensory Assessment (Somatosensory)    Sensory Assessment (Somatosensory) unable/difficult to assess  -MA     Row Name 03/03/22 1009          Vision Assessment/Intervention    Visual Impairment/Limitations peripheral vision impaired right  -MA     Visual Processing Deficit visual attention, right  -MA     Row Name 03/03/22 1009          Range of Motion Comprehensive    General Range of Motion bilateral upper extremity ROM WFL  -MA     Row Name 03/03/22 1009          Strength Comprehensive (MMT)    General Manual Muscle Testing (MMT) Assessment upper extremity strength deficits identified  -MA     Comment, General Manual Muscle Testing (MMT) Assessment BUE grossly 4/5, formal assessment limited d/t difficulty w/ command following.  -MA     Row Name 03/03/22 1009          Balance    Balance Assessment sitting static balance; sitting dynamic balance; standing static balance; standing dynamic balance  -MA     Static Sitting Balance WFL; unsupported; sitting in chair; sitting, edge of bed  -MA     Dynamic Sitting Balance WFL; unsupported; sitting in chair; sitting, edge of bed  -MA     Static Standing Balance mild impairment; unsupported; standing  -MA     Dynamic Standing Balance  mild impairment; unsupported; standing  -MA     Balance Interventions sit to stand; occupation based/functional task  -MA           User Key  (r) = Recorded By, (t) = Taken By, (c) = Cosigned By    Initials Name Provider Type    Kailee Escobar OT Occupational Therapist               Goals/Plan     [facility] Name 03/03/22 1012          Transfer Goal 1 (OT)    Activity/Assistive Device (Transfer Goal 1, OT) bed-to-chair/chair-to-bed; toilet; commode  -MA     [place] Level/Cues Needed (Transfer Goal 1, OT) supervision required  -MA     Time Frame (Transfer Goal 1, OT) long term goal (LTG); 10 days  -MA     Progress/Outcome (Transfer Goal 1, OT) goal ongoing  -[facility] Name 03/03/22 1012          Dressing Goal 1 (OT)    Activity/Device (Dressing Goal 1, OT) lower body dressing  don/doff socks w/ AAD  -MA     [place]/Cues Needed (Dressing Goal 1, OT) minimum assist (75% or more patient effort); verbal cues required  -MA     Time Frame (Dressing Goal 1, OT) long term goal (LTG); 10 days  -MA     Progress/Outcome (Dressing Goal 1, OT) goal ongoing  -[facility] Name 03/03/22 1012          Grooming Goal 1 (OT)    Activity/Device (Grooming Goal 1, OT) hair care; oral care; wash face, hands  s[place]ide  -MA     [place] (Grooming Goal 1, OT) set-up required; verbal cues required  -MA     Time Frame (Grooming Goal 1, OT) long term goal (LTG); 10 days  -MA     Progress/Outcome (Grooming Goal 1, OT) goal ongoing  -MA           User Key  (r) = Recorded By, (t) = Taken By, (c) = Cosigned By    Initials Name Provider Type    Kailee Escobar OT Occupational Therapist               Clinical Impression     Row Name 03/03/22 1010          Pain Assessment    Additional Documentation Pain Scale: Numbers Pre/Post-Treatment (Group)  -MA     Row Name 03/03/22 1010          Pain Scale: Numbers Pre/Post-Treatment    Pretreatment Pain Rating 0/10 - no pain  -MA     Posttreatment Pain Rating 0/10 - no pain  -MA     Row Name  03/03/22 1010          Plan of Care Review    Plan of Care Reviewed With patient  -MA     Outcome Summary OT eval complete. Pt presents w/ expressive aphasia, R coordination deficits, R visual field cut, and balance deficits limiting her ADL independence. Pt would benefit from continued skilled IPOT services to address current functional deficits. Recommend IRF at d/c.  -MA     Row Name 03/03/22 1010          Therapy Assessment/Plan (OT)    Rehab Potential (OT) good, to achieve stated therapy goals  -MA     Criteria for Skilled Therapeutic Interventions Met (OT) yes; skilled treatment is necessary  -MA     Therapy Frequency (OT) daily  -MA     Row Name 03/03/22 1010          Therapy Plan Review/Discharge Plan (OT)    Anticipated Discharge Disposition (OT) inpatient rehabilitation facility  -MA     Row Name 03/03/22 1010          Vital Signs    Pre Systolic BP Rehab 138  -MA     Pre Treatment Diastolic BP 78  -MA     Post Systolic BP Rehab 143  -MA     Post Treatment Diastolic BP 61  -MA     Pretreatment Heart Rate (beats/min) 86  -MA     Posttreatment Heart Rate (beats/min) 90  -MA     Pre SpO2 (%) 94  -MA     O2 Delivery Pre Treatment room air  -MA     O2 Delivery Intra Treatment room air  -MA     Post SpO2 (%) 98  -MA     O2 Delivery Post Treatment room air  -MA     Pre Patient Position Supine  -MA     Intra Patient Position Standing  -MA     Post Patient Position Sitting  -MA     Row Name 03/03/22 1010          Positioning and Restraints    Pre-Treatment Position in bed  -MA     Post Treatment Position chair  -MA     In Chair notified nsg; reclined; call light within reach; encouraged to call for assist; exit alarm on; waffle cushion; legs elevated  -MA           User Key  (r) = Recorded By, (t) = Taken By, (c) = Cosigned By    Initials Name Provider Type    Kailee Escobar, OT Occupational Therapist               Outcome Measures     Row Name 03/03/22 1013          How much help from another is currently  needed...    Putting on and taking off regular lower body clothing? 2  -MA     Bathing (including washing, rinsing, and drying) 2  -MA     Toileting (which includes using toilet bed pan or urinal) 3  -MA     Putting on and taking off regular upper body clothing 2  -MA     Taking care of personal grooming (such as brushing teeth) 3  -MA     Eating meals 3  -MA     AM-PAC 6 Clicks Score (OT) 15  -MA     Row Name 03/03/22 0800          How much help from another person do you currently need...    Turning from your back to your side while in flat bed without using bedrails? 4  -SW     Moving from lying on back to sitting on the side of a flat bed without bedrails? 4  -SW     Moving to and from a bed to a chair (including a wheelchair)? 3  -SW     Standing up from a chair using your arms (e.g., wheelchair, bedside chair)? 3  -SW     Climbing 3-5 steps with a railing? 2  -SW     To walk in hospital room? 2  -SW     AM-PAC 6 Clicks Score (PT) 18  -SW     Row Name 03/03/22 1013          Functional Assessment    Outcome Measure Options AM-PAC 6 Clicks Daily Activity (OT)  -MA           User Key  (r) = Recorded By, (t) = Taken By, (c) = Cosigned By    Initials Name Provider Type    Carine Cross RN Registered Nurse    Kailee Escobar OT Occupational Therapist                Occupational Therapy Education                 Title: PT OT SLP Therapies (In Progress)     Topic: Occupational Therapy (In Progress)     Point: ADL training (In Progress)     Description:   Instruct learner(s) on proper safety adaptation and remediation techniques during self care or transfers.   Instruct in proper use of assistive devices.              Learning Progress Summary           Patient Acceptance, E, NR by MA at 3/3/2022 1013                   Point: Home exercise program (Not Started)     Description:   Instruct learner(s) on appropriate technique for monitoring, assisting and/or progressing therapeutic exercises/activities.               Learner Progress:  Not documented in this visit.          Point: Precautions (In Progress)     Description:   Instruct learner(s) on prescribed precautions during self-care and functional transfers.              Learning Progress Summary           Patient Acceptance, E, NR by MA at 3/3/2022 1013                   Point: Body mechanics (In Progress)     Description:   Instruct learner(s) on proper positioning and spine alignment during self-care, functional mobility activities and/or exercises.              Learning Progress Summary           Patient Acceptance, E, NR by MA at 3/3/2022 1013                               User Key     Initials Effective Dates Name Provider Type Discipline    MA 11/19/20 -  Kailee Alcaraz OT Occupational Therapist OT              OT Recommendation and Plan  Therapy Frequency (OT): daily  Plan of Care Review  Plan of Care Reviewed With: patient  Outcome Summary: OT eval complete. Pt presents w/ expressive aphasia, R coordination deficits, R visual field cut, and balance deficits limiting her ADL independence. Pt would benefit from continued skilled IPOT services to address current functional deficits. Recommend IRF at d/c.     Time Calculation:    Time Calculation- OT     Row Name 03/03/22 1014             Time Calculation- OT    OT Start Time 0909  -MA      OT Received On 03/03/22  -MA      OT Goal Re-Cert Due Date 03/13/22  -MA              Timed Charges    22767 - OT Therapeutic Activity Minutes 8  -MA      64850 - OT Self Care/Mgmt Minutes 16  -MA              Untimed Charges    OT Eval/Re-eval Minutes 31  -MA              Total Minutes    Timed Charges Total Minutes 24  -MA      Untimed Charges Total Minutes 31  -MA       Total Minutes 55  -MA            User Key  (r) = Recorded By, (t) = Taken By, (c) = Cosigned By    Initials Name Provider Type    Kailee Escobar OT Occupational Therapist              Therapy Charges for Today     Code Description Service Date Service  Provider Modifiers Qty    16290220692 HC OT THERAPEUTIC ACT EA 15 MIN 3/3/2022 Kailee Alcaraz OT GO 1    64698021858 HC OT SELF CARE/MGMT/TRAIN EA 15 MIN 3/3/2022 Kailee Alcaraz OT GO 1    30970576377 HC OT EVAL MOD COMPLEXITY 3 3/3/2022 Kailee Alcaraz OT GO 1               Kailee Alcaraz OT  3/3/2022

## 2022-03-03 NOTE — PLAN OF CARE
SLP evaluation completed. Will continue to address speech/language. Please see note for further details and recommendations.  Goal Outcome Evaluation:

## 2022-03-03 NOTE — THERAPY EVALUATION
Patient Name: Maryana Luevano  : 1960    MRN: [id number]                              Today's Date: 3/3/2022       Admit Date: 3/2/2022    Visit Dx: No diagnosis found.  Patient Active Problem List   Diagnosis   • AIS   • Hypertension   • Dyslipidemia   • T2DM    • Seizure disorder (on Trileptal)   • Depression     Past Medical History:   Diagnosis Date   • Depression 3/2/2022   • Dyslipidemia 3/2/2022   • Hypertension 3/2/2022   • Seizure disorder (on Trileptal) 3/2/2022   • T2DM  3/2/2022     History reviewed. No pertinent surgical history.   General Information     Row Name 22 1054          Physical Therapy Time and Intention    Document Type evaluation  -AE     Mode of Treatment physical therapy  -AE     Row Name 22 1054          General Information    Patient Profile Reviewed yes  -AE     Prior Level of Function independent:; all household mobility; gait; transfer; bed mobility; ADL's; dressing; bathing  Pt limited d/t expressive aphasia.  -AE     Existing Precautions/Restrictions fall; other (see comments)  R visual field cut; expressive aphasia  -AE     Barriers to Rehab medically complex  -AE     Row Name 22 1054          Living Environment    Lives With grandchild(kareem)  -AE     Row Name 22 1054          Home Main Entrance    Number of Stairs, Main Entrance two  -AE     Stair Railings, Main Entrance railings on both sides of stairs  -AE     Row Name 22 1054          Stairs Within Home, Primary    Number of Stairs, Within Home, Primary none  -AE     Stair Railings, Within Home, Primary none  -AE     Row Name 22 1054          Cognition    Orientation Status (Cognition) oriented x 3  Pt able to answer yes/no questions appropriately  -AE     Row Name 22 1054          Safety Issues, Functional Mobility    Safety Issues Affecting Function (Mobility) awareness of need for assistance; insight into deficits/self-awareness; safety precaution awareness; safety precautions  follow-through/compliance; sequencing abilities  -AE     Impairments Affecting Function (Mobility) balance; motor planning; visual/perceptual  -AE     Comment, Safety Issues/Impairments (Mobility) Pt with R visual field cut and decreased balance.  -AE           User Key  (r) = Recorded By, (t) = Taken By, (c) = Cosigned By    Initials Name Provider Type    AE Ron Alvarez PT Physical Therapist               Mobility     Row Name 03/03/22 1059          Bed Mobility    Comment (Bed Mobility) UIC  -AE     Row Name 03/03/22 1059          Transfers    Comment (Transfers) Pt CGA for STS, VCs for hand placement and sequencing.  -AE     Row Name 03/03/22 1059          Sit-Stand Transfer    Sit-Stand [place] (Transfers) contact guard; verbal cues  -AE     Row Name 03/03/22 1059          Gait/Stairs (Locomotion)    [place] Level (Gait) contact guard; 1 person assist; 1 person to manage equipment; verbal cues  -AE     Assistive Device (Gait) other (see comments)  BUE support on tele monitor  -AE     Distance in Feet (Gait) 60  -AE     Deviations/Abnormal Patterns (Gait) bilateral deviations; base of support, narrow; keily decreased; gait speed decreased; stride length decreased  -AE     Bilateral Gait Deviations forward flexed posture; heel strike decreased  -AE     Comment (Gait/Stairs) Pt demonstrated step through gait pattern with decreased keily, decreased step length, and R visual field cut. Pt unable to recognize obstacles on R side, requiring frequent VCs for R visual scanning. Pt also with forward flexed posture and decreased balance with ambulation.  -AE           User Key  (r) = Recorded By, (t) = Taken By, (c) = Cosigned By    Initials Name Provider Type    AE Ron Alvarez PT Physical Therapist               Obj/Interventions     Row Name 03/03/22 1106          Range of Motion Comprehensive    General Range of Motion bilateral lower extremity ROM WFL  -AE     Row Name 03/03/22 1106           Strength Comprehensive (MMT)    Comment, General Manual Muscle Testing (MMT) Assessment Pt with slight difficulty with command following, BLE 4/5  -AE     Row Name 03/03/22 1106          Balance    Balance Assessment sitting static balance; sitting dynamic balance; standing static balance; standing dynamic balance  -AE     Static Sitting Balance WFL; unsupported; sitting in chair  -AE     Dynamic Sitting Balance WFL; unsupported; sitting in chair  -AE     Static Standing Balance WFL; supported; standing  -AE     Dynamic Standing Balance mild impairment; supported; standing  -AE     Row Name 03/03/22 1106          Sensory Assessment (Somatosensory)    Sensory Assessment (Somatosensory) LE sensation intact  -AE           User Key  (r) = Recorded By, (t) = Taken By, (c) = Cosigned By    Initials Name Provider Type    AE Ron Alvarez, PT Physical Therapist               Goals/Plan     Row Name 03/03/22 1113          Bed Mobility Goal 1 (PT)    Activity/Assistive Device (Bed Mobility Goal 1, PT) sit to supine/supine to sit  -AE     [place] Level/Cues Needed (Bed Mobility Goal 1, PT) supervision required  -AE     Time Frame (Bed Mobility Goal 1, PT) long term goal (LTG); 2 weeks  -AE     Progress/Outcomes (Bed Mobility Goal 1, PT) goal ongoing  -AE     Row Name 03/03/22 1113          Transfer Goal 1 (PT)    Activity/Assistive Device (Transfer Goal 1, PT) sit-to-stand/stand-to-sit; bed-to-chair/chair-to-bed  -AE     [place] Level/Cues Needed (Transfer Goal 1, PT) supervision required  -AE     Time Frame (Transfer Goal 1, PT) long term goal (LTG); 2 weeks  -AE     Progress/Outcome (Transfer Goal 1, PT) goal ongoing  -AE     Row Name 03/03/22 1113          Gait Training Goal 1 (PT)    Activity/Assistive Device (Gait Training Goal 1, PT) gait (walking locomotion)  -AE     [place] Level (Gait Training Goal 1, PT) contact guard assist; 1 person assist  -AE     Distance (Gait Training Goal 1, PT) 250  -AE      Time Frame (Gait Training Goal 1, PT) long term goal (LTG); 2 weeks  -AE     Progress/Outcome (Gait Training Goal 1, PT) goal ongoing  -AE           User Key  (r) = Recorded By, (t) = Taken By, (c) = Cosigned By    Initials Name Provider Type    AE Ron Alvarez, PT Physical Therapist               Clinical Impression     Row Name 03/03/22 1108          Pain    Additional Documentation Pain Scale: Numbers Pre/Post-Treatment (Group)  -AE     Row Name 03/03/22 1108          Pain Scale: Numbers Pre/Post-Treatment    Pretreatment Pain Rating 0/10 - no pain  -AE     Posttreatment Pain Rating 0/10 - no pain  -AE     Row Name 03/03/22 1108          Plan of Care Review    Plan of Care Reviewed With patient  -AE     Progress no change  -AE     Outcome Summary PT evaluation complete on patient s/p thrombectomy presenting with R visual field deficits, decreased balance, and decreased independence. Pt ambulated 60 ft with CGA x1 +1, frequent VCs for R visual scanning. Pt decreased independence warrants skilled IP PT interventions. Recommend D/C to IRF.  -AE     Row Name 03/03/22 1108          Therapy Assessment/Plan (PT)    Patient/Family Therapy Goals Statement (PT) Unable to assess; TBA at later date  -AE     Rehab Potential (PT) good, to achieve stated therapy goals  -AE     Criteria for Skilled Interventions Met (PT) yes  -AE     Row Name 03/03/22 1108          Vital Signs    Pre Systolic BP Rehab 117  -AE     Pre Treatment Diastolic BP 99  -AE     Post Systolic BP Rehab 132  -AE     Post Treatment Diastolic BP 71  -AE     Pretreatment Heart Rate (beats/min) 86  -AE     Posttreatment Heart Rate (beats/min) 89  -AE     Pre SpO2 (%) 96  -AE     O2 Delivery Pre Treatment room air  -AE     Post SpO2 (%) 98  -AE     O2 Delivery Post Treatment room air  -AE     Pre Patient Position Sitting  -AE     Intra Patient Position Standing  -AE     Post Patient Position Sitting  -AE     Row Name 03/03/22 1108          Positioning and  Restraints    Pre-Treatment Position sitting in chair/recliner  -AE     Post Treatment Position chair  -AE     In Chair notified nsg; reclined; call light within reach; encouraged to call for assist; exit alarm on; LUE elevated; waffle cushion; legs elevated  -AE           User Key  (r) = Recorded By, (t) = Taken By, (c) = Cosigned By    Initials Name Provider Type    Ron Courtney PT Physical Therapist               Outcome Measures     Row Name 03/03/22 1114 03/03/22 0800       How much help from another person do you currently need...    Turning from your back to your side while in flat bed without using bedrails? 3  -AE 4  -SW    Moving from lying on back to sitting on the side of a flat bed without bedrails? 3  -AE 4  -SW    Moving to and from a bed to a chair (including a wheelchair)? 3  -AE 3  -SW    Standing up from a chair using your arms (e.g., wheelchair, bedside chair)? 3  -AE 3  -SW    Climbing 3-5 steps with a railing? 2  -AE 2  -SW    To walk in hospital room? 3  -AE 2  -SW    AM-PAC 6 Clicks Score (PT) 17  -AE 18  -SW    Row Name 03/03/22 1114 03/03/22 1013       Functional Assessment    Outcome Measure Options AM-PAC 6 Clicks Basic Mobility (PT)  -AE AM-PAC 6 Clicks Daily Activity (OT)  -MA          User Key  (r) = Recorded By, (t) = Taken By, (c) = Cosigned By    Initials Name Provider Type    Carine Cross RN Registered Nurse    Kailee Escobar OT Occupational Therapist    Ron Courtney, PT Physical Therapist                             Physical Therapy Education                 Title: PT OT SLP Therapies (In Progress)     Topic: Physical Therapy (In Progress)     Point: Mobility training (In Progress)     Learning Progress Summary           Patient Acceptance, E, NR by AE at 3/3/2022 1020                   Point: Home exercise program (In Progress)     Learning Progress Summary           Patient Acceptance, E, NR by AE at 3/3/2022 1020                   Point: Body  mechanics (In Progress)     Learning Progress Summary           Patient Acceptance, E, NR by AE at 3/3/2022 1020                   Point: Precautions (In Progress)     Learning Progress Summary           Patient Acceptance, E, NR by AE at 3/3/2022 1020                               User Key     Initials Effective Dates Name Provider Type Discipline    AE 09/21/21 -  Ron Alvarez PT Physical Therapist PT              PT Recommendation and Plan  Planned Therapy Interventions (PT): balance training, bed mobility training, gait training, home exercise program, patient/family education, postural re-education, ROM (range of motion), stair training, strengthening, transfer training  Plan of Care Reviewed With: patient  Progress: no change  Outcome Summary: PT evaluation complete on patient s/p thrombectomy presenting with R visual field deficits, decreased balance, and decreased independence. Pt ambulated 60 ft with CGA x1 +1, frequent VCs for R visual scanning. Pt decreased independence warrants skilled IP PT interventions. Recommend D/C to IRF.     Time Calculation:    PT Charges     Row Name 03/03/22 1115             Time Calculation    Start Time 1020  -AE      PT Received On 03/03/22  -AE      PT Goal Re-Cert Due Date 03/13/22  -AE              Time Calculation- PT    Total Timed Code Minutes- PT 8 minute(s)  -AE              Timed Charges    01442 - PT Therapeutic Activity Minutes 8  -AE              Untimed Charges    PT Eval/Re-eval Minutes 46  -AE              Total Minutes    Timed Charges Total Minutes 8  -AE      Untimed Charges Total Minutes 46  -AE       Total Minutes 54  -AE            User Key  (r) = Recorded By, (t) = Taken By, (c) = Cosigned By    Initials Name Provider Type    AE Ron Alvarez PT Physical Therapist              Therapy Charges for Today     Code Description Service Date Service Provider Modifiers Qty    [id number]  PT THERAPEUTIC ACT EA 15 MIN 3/3/2022 Ron Alvarez PT GP  1    [id number]  PT EVAL MOD COMPLEXITY 4 3/3/2022 Ron Alvarez, PT GP 1          PT G-Codes  Outcome Measure Options: AM-PAC 6 Clicks Basic Mobility (PT)  AM-PAC 6 Clicks Score (PT): 17  AM-PAC 6 Clicks Score (OT): 15    Ron Alvarez PT  3/3/2022

## 2022-03-04 ENCOUNTER — APPOINTMENT (OUTPATIENT)
Dept: CARDIOLOGY | Facility: HOSPITAL | Age: 62
End: 2022-03-04

## 2022-03-04 LAB
ALBUMIN SERPL-MCNC: 4.1 G/DL (ref 3.5–5.2)
ALP SERPL-CCNC: 113 U/L (ref 39–117)
ALT SERPL W P-5'-P-CCNC: 10 U/L (ref 1–33)
ANION GAP SERPL CALCULATED.3IONS-SCNC: 12 MMOL/L (ref 5–15)
AST SERPL-CCNC: 15 U/L (ref 1–32)
BASOPHILS # BLD AUTO: 0.04 10*3/MM3 (ref 0–0.2)
BASOPHILS NFR BLD AUTO: 0.5 % (ref 0–1.5)
BH CV ECHO MEAS - BSA(HAYCOCK): 2 M^2
BH CV ECHO MEAS - BSA: 1.9 M^2
BH CV ECHO MEAS - BZI_BMI: 28.9 KILOGRAMS/M^2
BH CV ECHO MEAS - BZI_METRIC_HEIGHT: 167.6 CM
BH CV ECHO MEAS - BZI_METRIC_WEIGHT: 81.2 KG
BILIRUB CONJ SERPL-MCNC: <0.2 MG/DL (ref 0–0.3)
BILIRUB INDIRECT SERPL-MCNC: NORMAL MG/DL
BILIRUB SERPL-MCNC: 0.4 MG/DL (ref 0–1.2)
BUN SERPL-MCNC: 9 MG/DL (ref 8–23)
BUN/CREAT SERPL: 16.1 (ref 7–25)
CALCIUM SPEC-SCNC: 9.7 MG/DL (ref 8.6–10.5)
CHLORIDE SERPL-SCNC: 100 MMOL/L (ref 98–107)
CLUMPED PLATELETS: PRESENT
CO2 SERPL-SCNC: 21 MMOL/L (ref 22–29)
CREAT SERPL-MCNC: 0.56 MG/DL (ref 0.57–1)
DEPRECATED RDW RBC AUTO: 40.8 FL (ref 37–54)
EGFRCR SERPLBLD CKD-EPI 2021: 103.3 ML/MIN/1.73
EOSINOPHIL # BLD AUTO: 0.11 10*3/MM3 (ref 0–0.4)
EOSINOPHIL NFR BLD AUTO: 1.4 % (ref 0.3–6.2)
ERYTHROCYTE [DISTWIDTH] IN BLOOD BY AUTOMATED COUNT: 12.2 % (ref 12.3–15.4)
GLUCOSE BLDC GLUCOMTR-MCNC: 109 MG/DL (ref 70–130)
GLUCOSE BLDC GLUCOMTR-MCNC: 114 MG/DL (ref 70–130)
GLUCOSE BLDC GLUCOMTR-MCNC: 156 MG/DL (ref 70–130)
GLUCOSE BLDC GLUCOMTR-MCNC: 190 MG/DL (ref 70–130)
GLUCOSE SERPL-MCNC: 185 MG/DL (ref 65–99)
HCT VFR BLD AUTO: 40.7 % (ref 34–46.6)
HGB BLD-MCNC: 13.7 G/DL (ref 12–15.9)
IMM GRANULOCYTES # BLD AUTO: 0.03 10*3/MM3 (ref 0–0.05)
IMM GRANULOCYTES NFR BLD AUTO: 0.4 % (ref 0–0.5)
LV EF 2D ECHO EST: 60 %
LYMPHOCYTES # BLD AUTO: 1.76 10*3/MM3 (ref 0.7–3.1)
LYMPHOCYTES NFR BLD AUTO: 22.7 % (ref 19.6–45.3)
MAGNESIUM SERPL-MCNC: 1.9 MG/DL (ref 1.6–2.4)
MAXIMAL PREDICTED HEART RATE: 158 BPM
MCH RBC QN AUTO: 30.9 PG (ref 26.6–33)
MCHC RBC AUTO-ENTMCNC: 33.7 G/DL (ref 31.5–35.7)
MCV RBC AUTO: 91.7 FL (ref 79–97)
MONOCYTES # BLD AUTO: 0.57 10*3/MM3 (ref 0.1–0.9)
MONOCYTES NFR BLD AUTO: 7.4 % (ref 5–12)
NEUTROPHILS NFR BLD AUTO: 5.24 10*3/MM3 (ref 1.7–7)
NEUTROPHILS NFR BLD AUTO: 67.6 % (ref 42.7–76)
NRBC BLD AUTO-RTO: 0 /100 WBC (ref 0–0.2)
PLATELET # BLD AUTO: 243 10*3/MM3 (ref 140–450)
PMV BLD AUTO: 10.1 FL (ref 6–12)
POTASSIUM SERPL-SCNC: 4.4 MMOL/L (ref 3.5–5.2)
PROT SERPL-MCNC: 6.9 G/DL (ref 6–8.5)
RBC # BLD AUTO: 4.44 10*6/MM3 (ref 3.77–5.28)
RBC MORPH BLD: NORMAL
SODIUM SERPL-SCNC: 133 MMOL/L (ref 136–145)
STRESS TARGET HR: 134 BPM
WBC MORPH BLD: NORMAL
WBC NRBC COR # BLD: 7.75 10*3/MM3 (ref 3.4–10.8)

## 2022-03-04 PROCEDURE — 99232 SBSQ HOSP IP/OBS MODERATE 35: CPT | Performed by: INTERNAL MEDICINE

## 2022-03-04 PROCEDURE — 93325 DOPPLER ECHO COLOR FLOW MAPG: CPT | Performed by: INTERNAL MEDICINE

## 2022-03-04 PROCEDURE — 93312 ECHO TRANSESOPHAGEAL: CPT

## 2022-03-04 PROCEDURE — 99232 SBSQ HOSP IP/OBS MODERATE 35: CPT | Performed by: CLINICAL NURSE SPECIALIST

## 2022-03-04 PROCEDURE — 25010000002 FENTANYL CITRATE (PF) 50 MCG/ML SOLUTION: Performed by: INTERNAL MEDICINE

## 2022-03-04 PROCEDURE — 99152 MOD SED SAME PHYS/QHP 5/>YRS: CPT | Performed by: INTERNAL MEDICINE

## 2022-03-04 PROCEDURE — 93321 DOPPLER ECHO F-UP/LMTD STD: CPT

## 2022-03-04 PROCEDURE — 99152 MOD SED SAME PHYS/QHP 5/>YRS: CPT

## 2022-03-04 PROCEDURE — 82962 GLUCOSE BLOOD TEST: CPT

## 2022-03-04 PROCEDURE — 99153 MOD SED SAME PHYS/QHP EA: CPT

## 2022-03-04 PROCEDURE — 83735 ASSAY OF MAGNESIUM: CPT | Performed by: INTERNAL MEDICINE

## 2022-03-04 PROCEDURE — 93312 ECHO TRANSESOPHAGEAL: CPT | Performed by: INTERNAL MEDICINE

## 2022-03-04 PROCEDURE — 93321 DOPPLER ECHO F-UP/LMTD STD: CPT | Performed by: INTERNAL MEDICINE

## 2022-03-04 PROCEDURE — 80076 HEPATIC FUNCTION PANEL: CPT | Performed by: INTERNAL MEDICINE

## 2022-03-04 PROCEDURE — 93325 DOPPLER ECHO COLOR FLOW MAPG: CPT

## 2022-03-04 PROCEDURE — 25010000002 MIDAZOLAM PER 1 MG

## 2022-03-04 PROCEDURE — 85025 COMPLETE CBC W/AUTO DIFF WBC: CPT | Performed by: INTERNAL MEDICINE

## 2022-03-04 PROCEDURE — 63710000001 INSULIN DETEMIR PER 5 UNITS: Performed by: INTERNAL MEDICINE

## 2022-03-04 PROCEDURE — 85007 BL SMEAR W/DIFF WBC COUNT: CPT | Performed by: INTERNAL MEDICINE

## 2022-03-04 PROCEDURE — 63710000001 INSULIN LISPRO (HUMAN) PER 5 UNITS: Performed by: INTERNAL MEDICINE

## 2022-03-04 PROCEDURE — 25010000002 FENTANYL CITRATE (PF) 50 MCG/ML SOLUTION

## 2022-03-04 PROCEDURE — 80048 BASIC METABOLIC PNL TOTAL CA: CPT | Performed by: INTERNAL MEDICINE

## 2022-03-04 RX ORDER — MIDAZOLAM HYDROCHLORIDE 1 MG/ML
INJECTION INTRAMUSCULAR; INTRAVENOUS
Status: COMPLETED
Start: 2022-03-04 | End: 2022-03-04

## 2022-03-04 RX ORDER — FENTANYL CITRATE 50 UG/ML
INJECTION, SOLUTION INTRAMUSCULAR; INTRAVENOUS
Status: COMPLETED
Start: 2022-03-04 | End: 2022-03-04

## 2022-03-04 RX ORDER — ASPIRIN 81 MG/1
81 TABLET, CHEWABLE ORAL DAILY
Status: DISCONTINUED | OUTPATIENT
Start: 2022-03-05 | End: 2022-03-08 | Stop reason: HOSPADM

## 2022-03-04 RX ORDER — FENTANYL CITRATE 50 UG/ML
50 INJECTION, SOLUTION INTRAMUSCULAR; INTRAVENOUS ONCE
Status: COMPLETED | OUTPATIENT
Start: 2022-03-04 | End: 2022-03-04

## 2022-03-04 RX ORDER — CLOPIDOGREL BISULFATE 75 MG/1
75 TABLET ORAL DAILY
Status: DISCONTINUED | OUTPATIENT
Start: 2022-03-04 | End: 2022-03-08 | Stop reason: HOSPADM

## 2022-03-04 RX ORDER — ASPIRIN 300 MG/1
300 SUPPOSITORY RECTAL DAILY
Status: DISCONTINUED | OUTPATIENT
Start: 2022-03-05 | End: 2022-03-04

## 2022-03-04 RX ORDER — ASPIRIN 325 MG
324 TABLET ORAL DAILY
Status: DISCONTINUED | OUTPATIENT
Start: 2022-03-05 | End: 2022-03-04

## 2022-03-04 RX ADMIN — INSULIN DETEMIR 10 UNITS: 100 INJECTION, SOLUTION SUBCUTANEOUS at 20:37

## 2022-03-04 RX ADMIN — OXCARBAZEPINE 300 MG: 300 TABLET, FILM COATED ORAL at 08:24

## 2022-03-04 RX ADMIN — MIDAZOLAM 2 MG: 1 INJECTION INTRAMUSCULAR; INTRAVENOUS at 10:20

## 2022-03-04 RX ADMIN — ATORVASTATIN CALCIUM 80 MG: 40 TABLET, FILM COATED ORAL at 20:30

## 2022-03-04 RX ADMIN — ASPIRIN 81 MG 81 MG: 81 TABLET ORAL at 08:24

## 2022-03-04 RX ADMIN — FENTANYL CITRATE 50 MCG: 50 INJECTION, SOLUTION INTRAMUSCULAR; INTRAVENOUS at 10:20

## 2022-03-04 RX ADMIN — Medication 10 ML: at 20:30

## 2022-03-04 RX ADMIN — FENTANYL CITRATE 50 MCG: 50 INJECTION, SOLUTION INTRAMUSCULAR; INTRAVENOUS at 10:25

## 2022-03-04 RX ADMIN — CLOPIDOGREL BISULFATE 75 MG: 75 TABLET ORAL at 16:28

## 2022-03-04 RX ADMIN — Medication 10 ML: at 08:27

## 2022-03-04 RX ADMIN — NICARDIPINE HYDROCHLORIDE 2.5 MG/HR: 25 INJECTION, SOLUTION INTRAVENOUS at 01:25

## 2022-03-04 RX ADMIN — INSULIN DETEMIR 10 UNITS: 100 INJECTION, SOLUTION SUBCUTANEOUS at 08:23

## 2022-03-04 RX ADMIN — OXCARBAZEPINE 300 MG: 300 TABLET, FILM COATED ORAL at 20:30

## 2022-03-04 RX ADMIN — MIDAZOLAM 2 MG: 1 INJECTION INTRAMUSCULAR; INTRAVENOUS at 10:25

## 2022-03-04 RX ADMIN — INSULIN LISPRO 3 UNITS: 100 INJECTION, SOLUTION INTRAVENOUS; SUBCUTANEOUS at 20:37

## 2022-03-04 RX ADMIN — INSULIN LISPRO 3 UNITS: 100 INJECTION, SOLUTION INTRAVENOUS; SUBCUTANEOUS at 08:23

## 2022-03-04 NOTE — CASE MANAGEMENT/SOCIAL WORK
Continued Stay Note  [facility]     Patient Name: Maryana Luevano  MRN: [id number]  Today's Date: 3/4/2022    Admit Date: 3/2/2022     Discharge Plan     Row Name 03/04/22 1409       Plan    Plan Rehab    Patient/Family in Agreement with Plan yes    Plan Comments Pt had LINDA today. Attempted to speak to her at  and she is very drowsy, but did speak to pt's grandson from [place]. Also spoke to pt's niece Laura Collett via phone. Jeny spoke to pt about rehab at [facility] and pt was agreeable. Jeny states she found her paperwork for pt's estate and she is not listed as pt's POA but discussed with pt yesterday about becoming pt's healthcare surrogate. Pt's grandson at bedside states he and Jeny are agreeable that it's best for Jeny to be her healthcare surrogate since he is in [place]. Notified  and placed an ACP consult. Also called a referral to April at [facility]. CM will follow.    Final Discharge Disposition Code 30 - still a patient               Discharge Codes    No documentation.               Expected Discharge Date and Time     Expected Discharge Date Expected Discharge Time    Mar 7, 2022             Avis Gaspar RN

## 2022-03-04 NOTE — PROGRESS NOTES
Stroke Progress Note       Chief Complaint:  Speech difficulty    Subjective    Subjective     Subjective:  Weakness improved.  Continues to have severe espresso aphasia.      Review of Systems   Unable to perform ROS: Patient nonverbal      UTO   Objective      Temp:  [98 °F (36.7 °C)-99.7 °F (37.6 °C)] 99.7 °F (37.6 °C)  Heart Rate:  [] 86  Resp:  [14-20] 14  BP: (105-145)/(61-93) 145/68    NEURO    MENTAL STATUS: alert, follows commands on verbal cues,  Severe expressive aphasia, says 1-2 words.        CRANIAL NERVES:    Pupils equal and reactive, EOMI intact, no gaze deviation, no nystagmus  Minor R facial droop, cough intact, shoulder shrug intact, tongue midline     MOTOR:  Right upper extremity 4/5, right  less than left    SENSORY: Normal to light touch all throughout     COORDINATION: Normal finger to nose and heel to shin, no tremor, no dysmetria    STATION: Not assessed due to patient condition    GAIT: Not assessed due to patient condition  Results Review:    I reviewed the patient's new clinical results.    Lab Results (last 24 hours)     Procedure Component Value Units Date/Time    POC Glucose Once [[id number]]  (Normal) Collected: 03/04/22 1142    Specimen: Blood Updated: 03/04/22 1151     Glucose 114 mg/dL      Comment: Meter: [id number] : [id number] Shayan Hamilton       CBC & Differential [[id number]]  (Abnormal) Collected: 03/04/22 0931    Specimen: Blood Updated: 03/04/22 1024    Narrative:      The following orders were created for panel order CBC & Differential.  Procedure                               Abnormality         Status                     ---------                               -----------         ------                     CBC Auto Differential[[id number]]        Abnormal            Final result               Scan Slide[[id number]]                                       Final result                 Please view results for these tests on the individual orders.    CBC Auto  Differential [[id number]]  (Abnormal) Collected: 03/04/22 0931    Specimen: Blood Updated: 03/04/22 1024     WBC 7.75 10*3/mm3      RBC 4.44 10*6/mm3      Hemoglobin 13.7 g/dL      Hematocrit 40.7 %      MCV 91.7 fL      MCH 30.9 pg      MCHC 33.7 g/dL      RDW 12.2 %      RDW-SD 40.8 fl      MPV 10.1 fL      Platelets 243 10*3/mm3      Comment: Automated count may be inaccurate due to presence of platelet clumps. Platelets appear adequate upon review of peripheral smear.         Neutrophil % 67.6 %      Lymphocyte % 22.7 %      Monocyte % 7.4 %      Eosinophil % 1.4 %      Basophil % 0.5 %      Immature Grans % 0.4 %      Neutrophils, Absolute 5.24 10*3/mm3      Lymphocytes, Absolute 1.76 10*3/mm3      Monocytes, Absolute 0.57 10*3/mm3      Eosinophils, Absolute 0.11 10*3/mm3      Basophils, Absolute 0.04 10*3/mm3      Immature Grans, Absolute 0.03 10*3/mm3      nRBC 0.0 /100 WBC     Scan Slide [[id number]] Collected: 03/04/22 0931    Specimen: Blood Updated: 03/04/22 1024     RBC Morphology Normal     WBC Morphology Normal     Clumped Platelets Present    Basic Metabolic Panel [[id number]]  (Abnormal) Collected: 03/04/22 0931    Specimen: Blood Updated: 03/04/22 0959     Glucose 185 mg/dL      BUN 9 mg/dL      Creatinine 0.56 mg/dL      Sodium 133 mmol/L      Potassium 4.4 mmol/L      Comment: Slight hemolysis detected by analyzer. Results may be affected.        Chloride 100 mmol/L      CO2 21.0 mmol/L      Calcium 9.7 mg/dL      BUN/Creatinine Ratio 16.1     Anion Gap 12.0 mmol/L      eGFR 103.3 mL/min/1.73      Comment: National Kidney Foundation and American Society of Nephrology (ASN) Task Force recommended calculation based on the Chronic Kidney Disease Epidemiology Collaboration (CKD-EPI) equation refit without adjustment for race.       Narrative:      GFR Normal >60  Chronic Kidney Disease <60  Kidney Failure <15      Magnesium [[id number]]  (Normal) Collected: 03/04/22 0931    Specimen: Blood Updated:  03/04/22 0959     Magnesium 1.9 mg/dL     POC Glucose Once [[id number]]  (Abnormal) Collected: 03/04/22 0729    Specimen: Blood Updated: 03/04/22 0731     Glucose 190 mg/dL      Comment: Meter: [id number] : [id number] Shayan Hamilton       POC Glucose Once [[id number]]  (Abnormal) Collected: 03/03/22 2046    Specimen: Blood Updated: 03/03/22 2047     Glucose 167 mg/dL      Comment: Meter: [id number] : [id number] Dianna Gallegos       POC Glucose Once [[id number]]  (Abnormal) Collected: 03/03/22 2006    Specimen: Blood Updated: 03/03/22 2009     Glucose 221 mg/dL      Comment: Meter: [id number] : [id number] Timo Elena       POC Glucose Once [[id number]]  (Abnormal) Collected: 03/03/22 1628    Specimen: Blood Updated: 03/03/22 1637     Glucose 188 mg/dL      Comment: Meter: [id number] : [id number] Jordan Butler S       Potassium [[id number]]  (Normal) Collected: 03/03/22 1557    Specimen: Blood Updated: 03/03/22 1620     Potassium 4.1 mmol/L      Comment: Slight hemolysis detected by analyzer. Results may be affected.           CT Head Without Contrast    Result Date: 3/2/2022  Generally stable appearance of the patient's likely recent/subacute left parietal infarct with trace amount of adjacent iodine contrast staining. No significant new intracranial disease compared to 1:44 PM exam.   This report was finalized on 3/2/2022 5:28 PM by Dr. Markell Briscoe MD.      MRI Brain Without Contrast    Result Date: 3/3/2022  Extensive left MCA territory infarct as noted and generally corresponding to patient's CT images. No evidence of involvement of other infarct territory. No evidence of hemorrhage.  This report was finalized on 3/3/2022 8:56 AM by Dr. Markell Briscoe MD.      XR Chest 1 View    Result Date: 3/3/2022  1. Esophagogastric tube below the diaphragm. 2. Clear lungs.  This report was finalized on 3/3/2022 8:10 AM by Rudi Ochoa MD.      XR Abdomen KUB    Result Date: 3/2/2022  Feeding tube redundantly coiled in the  stomach.    This report was finalized on 3/2/2022 10:24 PM by Dr. Markell Briscoe MD.      Invasive peripheral vascular study    Addendum Date: 3/4/2022    PREOPERATIVE DIAGNOSIS: Left M2 occlusion ? POSTOPERATIVE DIAGNOSIS: Same ? INDICATION: This is a 61-year-old female who presented with worsening speech.  Imaging revealed an occlusion of the dominant left M2 trunk.  She was subsequently taken for thrombectomy. Emergency Consent Obtained PROCEDURES PERFORMED: 1.  1 vessel diagnostic cerebral arteriogram. 2. Transcatheter endovascular thrombectomy for treatment of acute thromboembolic stroke. 3.  Intra-arterial therapy with verapamil for vasospasm 4. Moderate Sedation 5.  Ultrasound guidance for vascular access ? Physician: Dr. Hamilton Nelson MD ? SEDATION: A total of 90 minutes of moderate sedation was provided by me. Physiologic vitals were monitored by an independently trained observer. A total of 25 mg of fentanyl and 0.5 mg of versed were given. Vessels selected: ? 1.  Left internal carotid artery ? PROCEDURE: ? The patient was taken to the bi-plane neuroangiography suite and placed supine on the procedure table. The patient was prepped and draped in the usual sterile fashion. A procedural timeout was performed prior to beginning the procedure. ? Ultrasound was used to identify the intended artery for access. It was patent, appropriate for catheterization, and used for guidance of the micropuncture. Successful cannulation was achieved and images were saved to PACS for review. With ultrasound assistance and use of the Seldinger technique, the right radial artery was accessed using a 5 Arabic micropuncture radial slender guide sheath kit. Once the sheath was placed, cocktail was slowly injected into the sheath. Please see chart for dosing used. At this time a De La Rosa 1 glide catheter with a Glidewire inside of it was then placed through the sheath. The catheter and glidewire were then advanced through the  arterial system, and positioned into the left internal carotid artery.  At this time, we used a Martinez exchange wire to replace the diagnostic catheter with a Terumo radial guide sheath.  Once the guide sheath was in the proximal internal carotid artery angiogram showed severe vasospasm of the cervical portion of the left internal carotid artery.  Because of this vasospasm, I could not advance a wire catheter passed it.  At this time, I slowly injected 10 mg of verapamil through the base catheter over the course of 10 minutes.  Repeat angiogram showed improvement of the vasospasm.  At this time is able to advance otherwise and catheters past the area of vasospasm. ? Under direct fluoroscopic guidance and using digital roadmap technique, a Vecta 71 aspiration catheter was passed over a 0.027 phenom 27 microcatheter which was passed over a 0.014 inch Synchro guidewire through the guide sheath and into the left internal carotid artery and the wire and microcatheter were passed across the site of occlusion. The Vecta 71 was advanced to the level of the site of occlusion. ? The microwire and microcatheter were both removed. The Vecta 74 catheter was then connected to aspiration for approximately 5 minutes. Under aspiration, the Vecta 71 catheter was slowly removed and the Infinity was vigorously aspirated and flushed with heparinized saline. Follow up angiogram showed there was now patency of the M2 branch.  There was continued to not residual clot within the vessel.  In order to try to remove this, I then reloaded my system back into the base catheter. Under direct fluoroscopic guidance and using digital roadmap technique, a Vecta 71 aspiration catheter was passed over a 0.027 phenom 27 microcatheter which was passed over a 0.014 inch Synchro guidewire through the guide sheath and into the left internal carotid artery and the wire and microcatheter were passed across the site of occlusion.  The microwire was then removed  and a 4 x 40 stent retriever Solitaire device was deployed across the occlusion.  Under aspiration this was removed and follow-up angiogram showed improved flow through the M2 vessel.  These findings were consistent with a TICI 3 thrombectomy. ? At this time, all catheters were removed and a TR band was inflated on the wrist prior to sheath removal. ? FINDINGS: Initial angiography of the left internal carotid artery shows normal distal cervical, petrous, cavernous and clinoid segments. The ophthalmic artery fills normally. The posterior communicating artery and anterior choroidal artery are easily visualized and of normal caliber. The anterior cerebral artery fills normally in all segments.  There is an occlusion of the dominant M2 trunk.  Follow-up angiogram after exchanging for the radial guide sheath shows severe vasospasm of the cervical segment of the internal carotid artery.  Follow-up angiogram after intra-arterial therapy with verapamil shows improvement.  Follow-up angiogram after the first lobectomy attempt shows that the dominant M2 trunk is now patent, but there is residual nonocclusive thrombus in the vessel.  Follow-up angiogram after second thrombectomy attempt shows continued patency of the M2 trunk with improved residual thrombus in the vessel.  These findings are consistent with a TICI 3 thrombectomy. ? Flour Time (min): 534 ? Air Kerma (mGy): 24.2 ? Contrast: 100 cc of 320 mg/ml Visipaque ? IMPRESSION: TICI 3 thrombectomy of an acute left M2 occlusion.  Additionally, successful vasospasm treatment of the cervical segment of the ICA with intra-arterial verapamil.  No complications noted.     Addendum Date: 3/4/2022    PREOPERATIVE DIAGNOSIS: Left M2 occlusion ? POSTOPERATIVE DIAGNOSIS: Same ? INDICATION: This is a 61-year-old female who presented with worsening speech.  Imaging revealed an occlusion of the dominant left M2 trunk.  She was subsequently taken for thrombectomy. Emergency Consent  Obtained PROCEDURES PERFORMED: 1.  1 vessel diagnostic cerebral arteriogram. 2. Transcatheter endovascular thrombectomy for treatment of acute thromboembolic stroke. 3.  Intra-arterial therapy with verapamil for vasospasm 4. Moderate Sedation 5.  Ultrasound guidance for vascular access ? Physician: Dr. Hamilton Nelson MD ? SEDATION: A total of 90 minutes of moderate sedation was provided by me. Physiologic vitals were monitored by an independently trained observer. A total of 25 mg of fentanyl and 0.5 mg of versed were given. Vessels selected: ? 1.  Right internal carotid artery ? PROCEDURE: ? The patient was taken to the bi-plane neuroangiography suite and placed supine on the procedure table. The patient was prepped and draped in the usual sterile fashion. A procedural timeout was performed prior to beginning the procedure. ? Ultrasound was used to identify the intended artery for access. It was patent, appropriate for catheterization, and used for guidance of the micropuncture. Successful cannulation was achieved and images were saved to PACS for review. With ultrasound assistance and use of the Seldinger technique, the right radial artery was accessed using a 5 [place]ican micropuncture radial slender guide sheath kit. Once the sheath was placed, cocktail was slowly injected into the sheath. Please see chart for dosing used. At this time a De La Rosa 1 glide catheter with a Glidewire inside of it was then placed through the sheath. The catheter and glidewire were then advanced through the arterial system, and positioned into the left internal carotid artery.  At this time, we used a Martinez exchange wire to replace the diagnostic catheter with a Terumo radial guide sheath.  Once the guide sheath was in the proximal internal carotid artery angiogram showed severe vasospasm of the cervical portion of the left internal carotid artery.  Because of this vasospasm, I could not advance a wire catheter passed it.  At this time,  I slowly injected 10 mg of verapamil through the base catheter over the course of 10 minutes.  Repeat angiogram showed improvement of the vasospasm.  At this time is able to advance otherwise and catheters past the area of vasospasm. ? Under direct fluoroscopic guidance and using digital roadmap technique, a Vecta 71 aspiration catheter was passed over a 0.027 phenom 27 microcatheter which was passed over a 0.014 inch Synchro guidewire through the guide sheath and into the left internal carotid artery and the wire and microcatheter were passed across the site of occlusion. The Vecta 71 was advanced to the level of the site of occlusion. ? The microwire and microcatheter were both removed. The Vecta 74 catheter was then connected to aspiration for approximately 5 minutes. Under aspiration, the Vecta 71 catheter was slowly removed and the Infinity was vigorously aspirated and flushed with heparinized saline. Follow up angiogram showed there was now patency of the M2 branch.  There was continued to not residual clot within the vessel.  In order to try to remove this, I then reloaded my system back into the base catheter. Under direct fluoroscopic guidance and using digital roadmap technique, a Vecta 71 aspiration catheter was passed over a 0.027 phenom 27 microcatheter which was passed over a 0.014 inch Synchro guidewire through the guide sheath and into the left internal carotid artery and the wire and microcatheter were passed across the site of occlusion.  The microwire was then removed and a 4 x 40 stent retriever Solitaire device was deployed across the occlusion.  Under aspiration this was removed and follow-up angiogram showed improved flow through the M2 vessel.  These findings were consistent with a TICI 3 thrombectomy. ? At this time, all catheters were removed and a TR band was inflated on the wrist prior to sheath removal. ? FINDINGS: Initial angiography of the left internal carotid artery shows normal  distal cervical, petrous, cavernous and clinoid segments. The ophthalmic artery fills normally. The posterior communicating artery and anterior choroidal artery are easily visualized and of normal caliber. The anterior cerebral artery fills normally in all segments.  There is an occlusion of the dominant M2 trunk.  Follow-up angiogram after exchanging for the radial guide sheath shows severe vasospasm of the cervical segment of the internal carotid artery.  Follow-up angiogram after intra-arterial therapy with verapamil shows improvement.  Follow-up angiogram after the first lobectomy attempt shows that the dominant M2 trunk is now patent, but there is residual nonocclusive thrombus in the vessel.  Follow-up angiogram after second thrombectomy attempt shows continued patency of the M2 trunk with improved residual thrombus in the vessel.  These findings are consistent with a TICI 3 thrombectomy. ? Flour Time (min): 534 ? Air Kerma (mGy): 24.2 ? Contrast: 100 cc of 320 mg/ml Visipaque ? IMPRESSION: TICI 3 thrombectomy of an acute left M2 occlusion.  Additionally, successful vasospasm treatment of the cervical segment of the ICA with intra-arterial verapamil.  No complications noted.     Result Date: 3/3/2022   TICI 3 thrombectomy of an acute left M2 occlusion.  Additionally, successful vasospasm treatment of the cervical segment of the ICA with intra-arterial verapamil.  No complications noted.     Results for orders placed during the hospital encounter of 03/02/22    Adult Transesophageal Echo (LINDA) W/ Cont if Necessary Per Protocol    Interpretation Summary  · Estimated left ventricular EF = 60% Left ventricular systolic function is normal.  · Saline test results are negative for intracardiac shunting.  · The most likely source based on the study is the aortic atheroma in her aortic arch.    Anesthesia: Cath lab/Echo lab moderate sedation    I was present with the patient for the duration of moderate sedation and  supervised staff who had no other duties and monitored the patient for the entire procedure.    Name of independent trained observer: Carine Guerrero RN  Intra-service start time: 1005  Intra-service end time: 1032    LINDA shows EF of 60%, saline results are negative for shunting, per cardiology note no source of embolus found.    , hemoglobin A1c 11.00, ALT and AST pending    Assessment/Plan     Assessment/Plan:      Acute ischemic stroke, left MCA, left inferior division occlusion s/p MT s/p TICI3    likely etiology cardio embolism vs atheroembolism    -Patient will be on aspirin 81 mg and Plavix 75 mg daily x30 days  -Will need 30-day event monitor at discharge, low threshold for starting oral anticoagulation at that time.  -PT/OT/Speech continue treatment plan, recommend inpatient rehab which I agree with  -Patient can be transferred to the telemetry unit              Hypertension-normalize blood pressure goals, systolic blood pressure 1 20-1 50   Type 2 diabetes-strict glycemic control, goal less than 7   Hyperlipidemia- LDL goal less than < 70. Start high intensity statin, will check hepatic function to make sure AST and ALT are not elevated with addition of statin.      Okay for patient to be transferred to the floor if needed.  She will be on aspirin and Plavix x30 days, she will need 30-day event monitor at discharge.  She will follow-up in stroke clinic in approximately 6 weeks after Holter monitor is complete.  Plan was discussed with patient and grandson at bedside, verbalized understanding.  No further inpatient stroke work-up is needed, we will sign off at this time.  Please call with any questions.    Nany Jacobs, JODY  03/04/22  15:01 EST

## 2022-03-04 NOTE — PROGRESS NOTES
INTENSIVIST   PROGRESS NOTE     Hospital:  LOS: 2 days      MYNOR Mijares 61 y.o. female is followed for: Speech Problem       AIS    Hypertension    Dyslipidemia    T2DM     Seizure disorder (on Trileptal)    Depression    As an Intensivist, we provide an integrated approach to the ICU patient and family, medical management of comorbid conditions, including but not limited to electrolytes, glycemic control, organ dysfunction, lead interdisciplinary rounds and coordinate the care with all other services, including those from other specialists.     Interval History:  POD: 2 Days Post-Op (Cerebral angiogram)    Uneventful night.  LINDA done today.    The patient qualifies to receive the vaccine, but they have not yet received it.     Temp  Min: 98.9 °F (37.2 °C)  Max: 99.7 °F (37.6 °C)       History     Last Reviewed by Linnea France MS CCC-SLP on 3/3/2022 at  2:05 PM    Sections Reviewed    Medical, Family, Surgical, Tobacco, Alcohol, Drug Use, Sexual Activity,   Social Documentation      Problem list reviewed by Kailash Conrad MD on 3/3/2022 at  7:46 AM  Medicines reviewed by Kailash Conrad MD on 3/3/2022 at  7:46 AM  Allergies reviewed by Kailash Conrad MD on 3/3/2022 at  7:46 AM       The patient's relevant past medical, surgical and social history were reviewed and updated in Epic as appropriate.        O     Vitals:  Temp: 99.7 °F (37.6 °C) (03/04/22 1200) Temp  Min: 98.9 °F (37.2 °C)  Max: 99.7 °F (37.6 °C)   Temp core:      BP: 122/70 (03/04/22 1500) BP  Min: 105/93  Max: 145/68   Pulse: 78 (03/04/22 1500) Pulse  Min: 73  Max: 109   Resp: 14 (03/04/22 1400) Resp  Min: 14  Max: 20   SpO2: 96 % (03/04/22 1500) SpO2  Min: 78 %  Max: 99 %   Device: room air (03/04/22 1400)    Flow Rate:   No data recorded     Intake/Ouptut 24 hrs (7:00AM - 6:59 AM)  Intake & Output (last 3 days)       03/01 0701 03/02 0700 03/02 0701 03/03 0700 03/03 0701 03/04 0700 03/04 0701 03/05 0700    P.O.   120     I.V. (mL/kg)  490.9  (6.4) 246 (3.2)     Other  40      Total Intake(mL/kg)  530.9 (6.9) 366 (4.7)     Urine (mL/kg/hr)  900 1500 (0.8)     Total Output  900 1500     Net  -369.2 -1134             Urine Unmeasured Occurrence   1 x 1 x          Wt Readings from Last 3 Encounters:   03/03/22 77.1 kg (170 lb)       Medications (drips):  niCARdipine, Last Rate: 2.5 mg/hr (03/04/22 0125)        Physical Examination  Telemetry:  Rhythm: normal sinus rhythm (03/04/22 1400)         Constitutional:  No acute distress.   Cardiovascular: RRR.   Normal heart sounds.  No murmurs, gallop or rub.   Respiratory: Normal breath sounds  No adventitious sounds.   Abdominal:  Soft with no tenderness.  No distension.   No HSM.   Extremities: Warm.  Dry.  No cyanosis.  No Edema   Neurological:   Awake.   Aphasia.  Best Eye Response: 4-->(E4) spontaneous (03/04/22 1400)  Best Motor Response: 6-->(M6) obeys commands (03/04/22 1400)  Best Verbal Response: 2-->(V2) incomprehensible speech (03/04/22 1400)  Neihart Coma Scale Score: 12 (03/04/22 1400)       NIH Stroke Scale  1a. Level of Consciousness: 0-->Alert, keenly responsive (03/04/22 0700)  1b. LOC Questions: 2-->Answers neither question correctly (03/04/22 0700)  1c. LOC Commands: 0-->Performs both tasks correctly (03/04/22 0700)  2. Best Gaze: 1-->Partial gaze palsy, gaze is abnormal in one or both eyes, but forced deviation or total gaze paresis is not present (03/04/22 0700)  3. Visual: 0-->No visual loss (03/04/22 0700)  4. Facial Palsy: 1-->Minor paralysis (flattened nasolabial fold, asymmetry on smiling) (03/04/22 0700)  5a. Motor Arm, Left: 0-->No drift, limb holds 90 (or 45) degrees for full 10 secs (03/04/22 0700)  5b. Motor Arm, Right: 0-->No drift, limb holds 90 (or 45) degrees for full 10 secs (03/04/22 0700)  6a. Motor Leg, Left: 0-->No drift, leg holds 30 degree position for full 5 secs (03/04/22 0700)  6b. Motor Leg, Right: 0-->No drift, leg holds 30 degree position for full 5 secs (03/04/22  0700)  7. Limb Ataxia: 0-->Absent (03/04/22 0700)  8. Sensory: 0-->Normal, no sensory loss (03/04/22 0700)  9. Best Language: 3-->Mute, global aphasia, no usable speech or auditory comprehension (03/04/22 0700)  10. Dysarthria: 2-->Severe dysarthria, patients speech is so slurred as to be unintelligible in the absence of or out of proportion to any dysphasia, or is mute/anarthric (03/04/22 0700)  11. Extinction and Inattention (formerly Neglect): 0-->No abnormality (03/04/22 0700)  Total (NIH Stroke Scale): 9 (03/04/22 0700)    Results Reviewed:  Laboratory  Microbiology  Radiology  Pathology    Hematology:  Results from last 7 days   Lab Units 03/04/22  0931 03/03/22  0712 03/02/22  1855 03/02/22  1855   WBC 10*3/mm3 7.75 6.95   < > 9.00   HEMOGLOBIN g/dL 13.7 12.9   < > 14.0   MCV fL 91.7 92.9   < > 90.9   PLATELETS 10*3/mm3 243 243   < > 262   NEUTROS ABS 10*3/mm3 5.24  --   --  6.77   LYMPHS ABS 10*3/mm3 1.76  --   --  1.70   EOS ABS 10*3/mm3 0.11  --   --  0.05    < > = values in this interval not displayed.     Chemistry:  Estimated Creatinine Clearance: 111.3 mL/min (A) (by C-G formula based on SCr of 0.56 mg/dL (L)).  Results from last 7 days   Lab Units 03/04/22  0931 03/03/22  1557 03/03/22  0712 03/03/22  0712   SODIUM mmol/L 133*  --   --  130*   POTASSIUM mmol/L 4.4 4.1   < > 3.5   CHLORIDE mmol/L 100  --   --  95*   CO2 mmol/L 21.0*  --   --  23.0   BUN mg/dL 9  --   --  8   CREATININE mg/dL 0.56*  --   --  0.51*   GLUCOSE mg/dL 185*  --   --  214*    < > = values in this interval not displayed.     Results from last 7 days   Lab Units 03/04/22  0931 03/03/22  0712   CALCIUM mg/dL 9.7 9.5   MAGNESIUM mg/dL 1.9 1.8   PHOSPHORUS mg/dL  --  3.1         Coagulation Labs:  Results from last 7 days   Lab Units 03/03/22  0019 03/02/22  1338 03/02/22  1337   PROTIME Seconds 12.5 11.4*  --    INR  0.96 0.9  --    APTT seconds 22.6*  --  30.7      COVID-19  Lab Results   Component Value Date    COVID19  Presumptive Negative 03/02/2022       Images:  CT Head Without Contrast    Result Date: 3/2/2022  Generally stable appearance of the patient's likely recent/subacute left parietal infarct with trace amount of adjacent iodine contrast staining. No significant new intracranial disease compared to 1:44 PM exam.   This report was finalized on 3/2/2022 5:28 PM by Dr. Markell Briscoe MD.      MRI Brain Without Contrast    Result Date: 3/3/2022  Extensive left MCA territory infarct as noted and generally corresponding to patient's CT images. No evidence of involvement of other infarct territory. No evidence of hemorrhage.  This report was finalized on 3/3/2022 8:56 AM by Dr. Markell Briscoe MD.      XR Chest 1 View    Result Date: 3/3/2022  1. Esophagogastric tube below the diaphragm. 2. Clear lungs.  This report was finalized on 3/3/2022 8:10 AM by Rudi Ochoa MD.      XR Abdomen KUB    Result Date: 3/2/2022  Feeding tube redundantly coiled in the stomach.    This report was finalized on 3/2/2022 10:24 PM by Dr. Markell Briscoe MD.        Echo:  Results for orders placed during the hospital encounter of 03/02/22    Adult Transesophageal Echo (LINDA) W/ Cont if Necessary Per Protocol    Interpretation Summary  · Estimated left ventricular EF = 60% Left ventricular systolic function is normal.  · Saline test results are negative for intracardiac shunting.  · The most likely source based on the study is the aortic atheroma in her aortic arch.    Anesthesia: Cath lab/Echo lab moderate sedation    I was present with the patient for the duration of moderate sedation and supervised staff who had no other duties and monitored the patient for the entire procedure.    Name of independent trained observer: Carine Guerrero RN  Intra-service start time: 1005  Intra-service end time: 1032      Results: Reviewed.  I reviewed the patient's new laboratory and imaging results.  I independently reviewed the patient's new images.    Medications:  Reviewed.    Assessment/Plan   A / P     Maryana is a 61 y.o. female admitted on 3/2/2022 with Acute CVA (cerebrovascular accident) (HCC) [I63.9]:    1. AIS: Left temporal and left parietal lobes. Probably cardioembolic.  1. S/P Cerebral angiogram: Mechanical thrombectomy 03/02/22  2. HTN  3. Dyslipidemia - statin  4. PMH: Seizure on Oxcarbazepine  5. Depression on  Cymbalta.  6. T2 Diabetes      Results from last 7 days   Lab Units 03/04/22  1142 03/04/22  0729 03/03/22  2046 03/03/22  2006 03/03/22  1628 03/03/22  1124   GLUCOSE mg/dL 114 190* 167* 221* 188* 243*     Lab Results   Lab Value Date/Time    HGBA1C 11.00 (H) 03/03/2022 0712       Nutrition Support: Patient isn't on Tube Feeding   Modulars: Patient doesn't have any tube feeding modular orders   Diet: Diet Dysphagia; IV - Mechanical Soft No Mixed Consistencies; Nectar / Syrup Thick; No Straws   Advance Directives: Code Status and Medical Interventions:   Ordered at: 03/02/22 1716     Code Status (Patient has no pulse and is not breathing):    CPR (Attempt to Resuscitate)     Medical Interventions (Patient has pulse or is breathing):    Full Support        Plan:    1. Discussed with Dr. Jose Eduardo Garcia (Vascular Neurologist).   2. Goal: Glucose < 180 mg/dL.  1. Insulin basal + bolus.  3. Disposition: Transfer to Telemetry Unit    1. Inpatient rehab at [facility] [place]    Plan of care and goals reviewed during interdisciplinary rounds.  I discussed the patient's findings and my recommendations with patient    Level of Risk is High due to:  illness with threat to life or bodily function and abrupt change in neurological status.     Time: 25 minutes, in direct patient care, with the patient and/or on the tripathi coordinating care with other health care providers.     I have spent > 50% percent of this time, counseling and discussing treatment options.     [x]  Primary Attending  []  Consultant

## 2022-03-04 NOTE — PROGRESS NOTES
Transesophageal echocardiogram completed.  No source of embolus was found.  Specifically there was no evidence of PFO/ASD, no evidence of thrombus in left atrial appendage and velocities there were normal, minimal atheroma in the aortic arch.  No papillary fibroblastoma's are noted on the aortic valve.  There is however mild degenerative change of the aortic valve with Lambl's excrescences noted.  Rachael Garcia MD, FACC

## 2022-03-04 NOTE — ACP (ADVANCE CARE PLANNING)
requested ACP visit with patient and family.  Patient asleep during visit, left living will form with family member.  Will follow-up, when patient is awake, with living will education.

## 2022-03-05 LAB
GLUCOSE BLDC GLUCOMTR-MCNC: 196 MG/DL (ref 70–130)
GLUCOSE BLDC GLUCOMTR-MCNC: 207 MG/DL (ref 70–130)
GLUCOSE BLDC GLUCOMTR-MCNC: 210 MG/DL (ref 70–130)
GLUCOSE BLDC GLUCOMTR-MCNC: 231 MG/DL (ref 70–130)

## 2022-03-05 PROCEDURE — 63710000001 INSULIN LISPRO (HUMAN) PER 5 UNITS: Performed by: INTERNAL MEDICINE

## 2022-03-05 PROCEDURE — 63710000001 INSULIN DETEMIR PER 5 UNITS: Performed by: INTERNAL MEDICINE

## 2022-03-05 PROCEDURE — 82962 GLUCOSE BLOOD TEST: CPT

## 2022-03-05 PROCEDURE — 99232 SBSQ HOSP IP/OBS MODERATE 35: CPT | Performed by: INTERNAL MEDICINE

## 2022-03-05 RX ADMIN — OXCARBAZEPINE 300 MG: 300 TABLET, FILM COATED ORAL at 21:14

## 2022-03-05 RX ADMIN — OXCARBAZEPINE 300 MG: 300 TABLET, FILM COATED ORAL at 09:00

## 2022-03-05 RX ADMIN — INSULIN LISPRO 5 UNITS: 100 INJECTION, SOLUTION INTRAVENOUS; SUBCUTANEOUS at 17:25

## 2022-03-05 RX ADMIN — ATORVASTATIN CALCIUM 80 MG: 40 TABLET, FILM COATED ORAL at 21:14

## 2022-03-05 RX ADMIN — INSULIN LISPRO 5 UNITS: 100 INJECTION, SOLUTION INTRAVENOUS; SUBCUTANEOUS at 21:15

## 2022-03-05 RX ADMIN — Medication 10 ML: at 21:14

## 2022-03-05 RX ADMIN — CLOPIDOGREL BISULFATE 75 MG: 75 TABLET ORAL at 09:00

## 2022-03-05 RX ADMIN — Medication 10 ML: at 09:00

## 2022-03-05 RX ADMIN — INSULIN DETEMIR 10 UNITS: 100 INJECTION, SOLUTION SUBCUTANEOUS at 21:17

## 2022-03-05 RX ADMIN — INSULIN LISPRO 5 UNITS: 100 INJECTION, SOLUTION INTRAVENOUS; SUBCUTANEOUS at 12:25

## 2022-03-05 RX ADMIN — INSULIN LISPRO 3 UNITS: 100 INJECTION, SOLUTION INTRAVENOUS; SUBCUTANEOUS at 08:59

## 2022-03-05 RX ADMIN — INSULIN DETEMIR 10 UNITS: 100 INJECTION, SOLUTION SUBCUTANEOUS at 08:59

## 2022-03-05 RX ADMIN — ASPIRIN 81 MG 81 MG: 81 TABLET ORAL at 09:00

## 2022-03-05 NOTE — PROGRESS NOTES
[facility] Medicine Services  PROGRESS NOTE    Patient Name: Maryana Luevano  : 1960  MRN: [id number]    Date of Admission: 3/2/2022  Primary Care Physician: Son Choi DO    Subjective   Subjective     CC: ICU follow-up for stroke    HPI: No acute events overnight, patient is nonverbal, does mention however that she is doing okay    ROS:  Unable to obtain as patient is nonverbal    Objective   Objective     Vital Signs:   Temp:  [97.6 °F (36.4 °C)-99.7 °F (37.6 °C)] 97.6 °F (36.4 °C)  Heart Rate:  [73-94] 81  Resp:  [14-20] 20  BP: (111-145)/(50-70) 135/70     Physical Exam:  Constitutional: No acute distress, awake, alert  HENT: NCAT, mucous membranes moist  Respiratory: Clear to auscultation bilaterally, respiratory effort normal   Cardiovascular: RRR, no murmurs, rubs, or gallops  Gastrointestinal: Positive bowel sounds, soft, nontender, nondistended  Musculoskeletal: No bilateral ankle edema  Psychiatric: Flat affect  Neurologic: Nonverbal  Skin: No rashes      Results Reviewed:  LAB RESULTS:      Lab 22  0931 22  0935 22  0712 22  0019 22  1855 22  1338 22  1337   WBC 7.75  --  6.95  --  9.00  --  6.22   HEMOGLOBIN 13.7  --  12.9  --  14.0  --  14.9   HEMATOCRIT 40.7  --  38.0  --  41.1  --  43.9   PLATELETS 243  --  243  --  262  --  255   NEUTROS ABS 5.24  --   --   --  6.77  --  3.86   IMMATURE GRANS (ABS) 0.03  --   --   --  0.03  --  0.03   LYMPHS ABS 1.76  --   --   --  1.70  --  1.78   MONOS ABS 0.57  --   --   --  0.41  --  0.42   EOS ABS 0.11  --   --   --  0.05  --  0.06   MCV 91.7  --  92.9  --  90.9  --  92.0   PROTIME  --   --   --  12.5  --  11.4*  --    APTT  --   --   --  22.6*  --   --  30.7   HEPARIN ANTI-XA  --  0.73*  --  0.10*  --   --   --          Lab 22  0931 22  1557 22  0712 22  1339 22  1337   SODIUM 133*  --  130*  --  135*   POTASSIUM 4.4 4.1 3.5  --  4.2   CHLORIDE 100   --  95*  --  98   CO2 21.0*  --  23.0  --  23.0   ANION GAP 12.0  --  12.0  --  14.0   BUN 9  --  8  --  9   CREATININE 0.56*  --  0.51* 0.50* 0.63   EGFR 103.3  --  105.7  --  100.4   GLUCOSE 185*  --  214*  --  249*   CALCIUM 9.7  --  9.5  --  9.8   MAGNESIUM 1.9  --  1.8  --   --    PHOSPHORUS  --   --  3.1  --   --    HEMOGLOBIN A1C  --   --  11.00*  --   --          Lab 03/04/22  0931   TOTAL PROTEIN 6.9   ALBUMIN 4.10   ALT (SGPT) 10   AST (SGOT) 15   BILIRUBIN 0.4   BILIRUBIN DIRECT <0.2   ALK PHOS 113         Lab 03/03/22  0019 03/02/22  1338 03/02/22  1337   TROPONIN T  --   --  <0.010   PROTIME 12.5 11.4*  --    INR 0.96 0.9  --          Lab 03/03/22  0712   CHOLESTEROL 268*   LDL CHOL 184*   HDL CHOL 49   TRIGLYCERIDES 187*             Brief Urine Lab Results     None          Microbiology Results Abnormal     Procedure Component Value - Date/Time    COVID PRE-OP / PRE-PROCEDURE SCREENING ORDER (NO ISOLATION) - Swab, Nasopharynx [[id number]]  (Normal) Collected: 03/02/22 1434    Lab Status: Final result Specimen: Swab from Nasopharynx Updated: 03/02/22 1456    Narrative:      The following orders were created for panel order COVID PRE-OP / PRE-PROCEDURE SCREENING ORDER (NO ISOLATION) - Swab, Nasopharynx.  Procedure                               Abnormality         Status                     ---------                               -----------         ------                     COVID-19, ABBOTT IN-HOUS...[[id number]]  Normal              Final result                 Please view results for these tests on the individual orders.    COVID-19, ABBOTT IN-HOUSE,NASAL Swab (NO TRANSPORT MEDIA) 2 HR TAT - Swab, Nasopharynx [[id number]]  (Normal) Collected: 03/02/22 1434    Lab Status: Final result Specimen: Swab from Nasopharynx Updated: 03/02/22 1456     COVID19 Presumptive Negative    Narrative:      Fact sheet for providers: https://www.fda.gov/media/903942/download     Fact sheet for patients:  https://www.fda.gov/media/390032/download    Test performed by PCR.  If inconsistent with clinical signs and symptoms patient should be tested with different authorized molecular test.          Adult Transesophageal Echo (LINDA) W/ Cont if Necessary Per Protocol    Result Date: 3/4/2022  · Estimated left ventricular EF = 60% Left ventricular systolic function is normal. · Saline test results are negative for intracardiac shunting. · The most likely source based on the study is the aortic atheroma in her aortic arch.  Anesthesia: Cath lab/Echo lab moderate sedation I was present with the patient for the duration of moderate sedation and supervised staff who had no other duties and monitored the patient for the entire procedure. Name of independent trained observer: Carine Guerrero RN Intra-service start time: 1005 Intra-service end time: 1032    Adult Transthoracic Echo Complete W/ Cont if Necessary Per Protocol (With Agitated Saline)    Result Date: 3/3/2022  · Normal left ventricular systolic function, estimated EF 60%. · Left ventricular diastolic function is consistent with (grade I) impaired relaxation. · Trace mitral regurgitation, trace tricuspid regurgitation. · Saline test results are negative.      SLP FEES - Fiberoptic Endo Eval Swallow    Result Date: 3/3/2022  This procedure was auto-finalized with no dictation required.    Doppler Transcranial Microbubble Injection CAR    Result Date: 3/3/2022  Velocity in the distal left MCA is modestly elevated at 117 cm/s in comparison with that of the right Otherwise, mean velocities and waveforms are normal in the right and left MCA and terminal ICA Following injection of agitated saline, both before and after Valsalva, no abnormal signals are seen in the left MCA Negative bubble study                        Results for orders placed during the hospital encounter of 03/02/22    Adult Transesophageal Echo (LINDA) W/ Cont if Necessary Per Protocol    Interpretation  Summary  · Estimated left ventricular EF = 60% Left ventricular systolic function is normal.  · Saline test results are negative for intracardiac shunting.  · The most likely source based on the study is the aortic atheroma in her aortic arch.    Anesthesia: Cath lab/Echo lab moderate sedation    I was present with the patient for the duration of moderate sedation and supervised staff who had no other duties and monitored the patient for the entire procedure.    Name of independent trained observer: Carine Guerrero RN  Intra-service start time: 1005  Intra-service end time: 1032      I have reviewed the medications:  Scheduled Meds:aspirin, 81 mg, Oral, Daily  atorvastatin, 80 mg, Oral, Nightly  clopidogrel, 75 mg, Oral, Daily  insulin detemir, 10 Units, Subcutaneous, Q12H  insulin lispro, 0-14 Units, Subcutaneous, 4x Daily With Meals & Nightly  OXcarbazepine, 300 mg, Oral, BID  sodium chloride, 10 mL, Intravenous, Q12H      Continuous Infusions:niCARdipine, 5-15 mg/hr, Last Rate: 2.5 mg/hr (03/04/22 0125)      PRN Meds:.•  acetaminophen  •  potassium chloride  •  sodium chloride  •  sodium chloride    Assessment/Plan   Assessment & Plan     Active Hospital Problems    Diagnosis  POA   • **AIS [I63.9]  Yes   • Hypertension [I10]  Yes   • Dyslipidemia [E78.5]  Yes   • T2DM  [E11.9]  Yes   • Seizure disorder (on Trileptal) [G40.909]  Yes   • Depression [F32.A]  Yes      Resolved Hospital Problems   No resolved problems to display.        Brief Hospital Course to date:  Maryana Luevano is a 61 y.o. female history of type 2 diabetes, seizure disorder, depression, hypertension, hyperlipidemia patient presented 3/2/2022 with acute CVA s/p mechanical thrombectomy, requiring ICU care post procedure.  Progressed well and was transferred to telemetry for ongoing care management.    Acute ischemic stroke s/p mechanical thrombectomy  -Neurology following, continue statin DAPT x30 days, will need 30-day event monitor at  discharge  -LINDA done, no evidence of PFO/ASD, no source of embolus found  -PT/OT following, anticipate discharge to rehab  -Follow-up in neurology clinic 6 weeks post discharge    Hypertension  -BP currently stable, not on any antihypertensives at home, continue to closely monitor    Seizure disorder-continue Trileptal    Type 2 diabetes  -FSBG is reviewed.  Continue basal, SSI    Depression-resume home Cymbalta    DVT prophylaxis:  Mechanical DVT prophylaxis orders are present.     AM-PAC 6 Clicks Score (PT): 23 (03/04/22 0800)    All problems listed above are new to me as this is my first encounter with patient.    Disposition: TBD    CODE STATUS:   Code Status and Medical Interventions:   Ordered at: 03/02/22 1716     Code Status (Patient has no pulse and is not breathing):    CPR (Attempt to Resuscitate)     Medical Interventions (Patient has pulse or is breathing):    Full Support       Geena Blanton MD  03/05/22

## 2022-03-05 NOTE — PLAN OF CARE
"Goal Outcome Evaluation:   NIH 10 at shift change, pt is aphasic but was able to say \"ok\" and \"thank you\" once this shift. Pt ESCALANTE and is able to FC. VSS. Pt was able to ambulate to bathroom, adequate UOP. Pt transferred to floor right before this am.               "

## 2022-03-06 LAB
GLUCOSE BLDC GLUCOMTR-MCNC: 123 MG/DL (ref 70–130)
GLUCOSE BLDC GLUCOMTR-MCNC: 183 MG/DL (ref 70–130)
GLUCOSE BLDC GLUCOMTR-MCNC: 242 MG/DL (ref 70–130)
GLUCOSE BLDC GLUCOMTR-MCNC: 255 MG/DL (ref 70–130)

## 2022-03-06 PROCEDURE — 82962 GLUCOSE BLOOD TEST: CPT

## 2022-03-06 PROCEDURE — 63710000001 INSULIN LISPRO (HUMAN) PER 5 UNITS: Performed by: INTERNAL MEDICINE

## 2022-03-06 PROCEDURE — 63710000001 INSULIN DETEMIR PER 5 UNITS: Performed by: INTERNAL MEDICINE

## 2022-03-06 PROCEDURE — 97530 THERAPEUTIC ACTIVITIES: CPT

## 2022-03-06 PROCEDURE — 99232 SBSQ HOSP IP/OBS MODERATE 35: CPT | Performed by: INTERNAL MEDICINE

## 2022-03-06 RX ADMIN — Medication 10 ML: at 20:09

## 2022-03-06 RX ADMIN — CLOPIDOGREL BISULFATE 75 MG: 75 TABLET ORAL at 08:06

## 2022-03-06 RX ADMIN — OXCARBAZEPINE 300 MG: 300 TABLET, FILM COATED ORAL at 08:06

## 2022-03-06 RX ADMIN — OXCARBAZEPINE 300 MG: 300 TABLET, FILM COATED ORAL at 20:09

## 2022-03-06 RX ADMIN — INSULIN DETEMIR 10 UNITS: 100 INJECTION, SOLUTION SUBCUTANEOUS at 20:10

## 2022-03-06 RX ADMIN — ASPIRIN 81 MG 81 MG: 81 TABLET ORAL at 08:06

## 2022-03-06 RX ADMIN — INSULIN LISPRO 8 UNITS: 100 INJECTION, SOLUTION INTRAVENOUS; SUBCUTANEOUS at 18:16

## 2022-03-06 RX ADMIN — Medication 10 ML: at 08:06

## 2022-03-06 RX ADMIN — INSULIN DETEMIR 10 UNITS: 100 INJECTION, SOLUTION SUBCUTANEOUS at 08:06

## 2022-03-06 RX ADMIN — ATORVASTATIN CALCIUM 80 MG: 40 TABLET, FILM COATED ORAL at 20:10

## 2022-03-06 RX ADMIN — INSULIN LISPRO 5 UNITS: 100 INJECTION, SOLUTION INTRAVENOUS; SUBCUTANEOUS at 12:30

## 2022-03-06 RX ADMIN — INSULIN LISPRO 3 UNITS: 100 INJECTION, SOLUTION INTRAVENOUS; SUBCUTANEOUS at 20:10

## 2022-03-06 NOTE — PLAN OF CARE
Goal Outcome Evaluation:  Plan of Care Reviewed With: patient        Progress: no change    PT HAS SLEPT. NO COMPLAINTS OF PAIN. PT WITH EXPRESSIVE APHASIA. PT IS ORIENTED. ABLE TO FOLLOW COMMANDS. NSR ON TELEMETRY. REMAINS ON RA. VOIDING PER BATHROOM.

## 2022-03-06 NOTE — THERAPY TREATMENT NOTE
Patient Name: Maryana Luevano  : 1960    MRN: [id number]                              Today's Date: 3/6/2022       Admit Date: 3/2/2022    Visit Dx:     ICD-10-CM ICD-9-CM   1. Oropharyngeal dysphagia  R13.12 787.22     Patient Active Problem List   Diagnosis   • AIS   • Hypertension   • Dyslipidemia   • T2DM    • Seizure disorder (on Trileptal)   • Depression     Past Medical History:   Diagnosis Date   • Depression 3/2/2022   • Dyslipidemia 3/2/2022   • Hypertension 3/2/2022   • Seizure disorder (on Trileptal) 3/2/2022   • T2DM  3/2/2022     Past Surgical History:   Procedure Laterality Date   • INTERVENTIONAL RADIOLOGY PROCEDURE Bilateral 3/2/2022    Procedure: CAROTID CEREBRAL ANGIOGRAM BILATERAL;  Surgeon: Hamilton Nelson MD;  Location: [facility] INVASIVE LOCATION;  Service: Interventional Radiology;  Laterality: Bilateral;      General Information     Row Name 22 1605          Physical Therapy Time and Intention    Document Type therapy note (daily note)  -KG     Mode of Treatment physical therapy  -KG     Row Name 22 1605          General Information    Existing Precautions/Restrictions fall;other (see comments)  expressive aphasia; R visual field cut  -KG     Barriers to Rehab medically complex  -KG     Row Name 22 1605          Cognition    Orientation Status (Cognition) oriented x 3  -KG     Row Name 22 1605          Safety Issues, Functional Mobility    Safety Issues Affecting Function (Mobility) awareness of need for assistance;insight into deficits/self-awareness;safety precaution awareness;safety precautions follow-through/compliance  -KG     Impairments Affecting Function (Mobility) balance;coordination;endurance/activity tolerance;postural/trunk control;strength;visual/perceptual  -KG           User Key  (r) = Recorded By, (t) = Taken By, (c) = Cosigned By    Initials Name Provider Type    KG Triny Scott, PT Physical Therapist               Mobility     Row  Name 03/06/22 1606          Bed Mobility    Bed Mobility supine-sit  -KG     Supine-Sit Bow (Bed Mobility) supervision;verbal cues  -KG     Assistive Device (Bed Mobility) bed rails;head of bed elevated  -KG     Comment, (Bed Mobility) VC's for sequencing.  -KG     Row Name 03/06/22 1606          Transfers    Comment, (Transfers) VC's for sequencing and safe hand placement.  -KG     Row Name 03/06/22 1606          Sit-Stand Transfer    Sit-Stand Bow (Transfers) contact guard;verbal cues  -KG     Assistive Device (Sit-Stand Transfers) walker, front-wheeled  -KG     Row Name 03/06/22 1606          Gait/Stairs (Locomotion)    Bow Level (Gait) contact guard;verbal cues  -KG     Assistive Device (Gait) walker, front-wheeled  -KG     Distance in Feet (Gait) 200  -KG     Deviations/Abnormal Patterns (Gait) bilateral deviations;base of support, narrow;keily decreased;stride length decreased  -KG     Bilateral Gait Deviations forward flexed posture  -KG     Comment, (Gait/Stairs) Pt demonstrated step through gait pattern with slow keily and decreased step length. Improved keily with continued forward ambulation. Pt with adequate balance and stability. Tendency to steer RW toward R with increased difficulty avoiding obstacles on the R. Able to correct with cueing. Pt's ambulation distance limited by fatigue.  -KG           User Key  (r) = Recorded By, (t) = Taken By, (c) = Cosigned By    Initials Name Provider Type    KG Triny Scott, PT Physical Therapist               Obj/Interventions     Row Name 03/06/22 1612          Balance    Balance Assessment sitting static balance;standing static balance;standing dynamic balance  -KG     Static Sitting Balance independent  -KG     Position, Sitting Balance supported;sitting edge of bed  -KG     Static Standing Balance contact guard  -KG     Dynamic Standing Balance contact guard  -KG     Position/Device Used, Standing Balance  supported;walker, front-wheeled  -KG           User Key  (r) = Recorded By, (t) = Taken By, (c) = Cosigned By    Initials Name Provider Type    Triny Benson, PT Physical Therapist               Goals/Plan    No documentation.                Clinical Impression     Row Name 03/06/22 1613          Pain    Pretreatment Pain Rating 0/10 - no pain  -KG     Posttreatment Pain Rating 0/10 - no pain  -KG     Row Name 03/06/22 1613          Plan of Care Review    Plan of Care Reviewed With patient  -KG     Progress improving  -KG     Outcome Evaluation Pt increased ambulation distance to 200ft with RW and CGA. VC's for upright posture and increased stride length. Pt with adequate balance and stability. Tendency to steer RW to the R with difficulty avoiding obstacles on that side. Able to correct with verbal cueing. Limited by fatigue. Continue to progress as appropriate.  -KG     Row Name 03/06/22 1613          Vital Signs    Pre Systolic BP Rehab 134  -KG     Pre Treatment Diastolic BP 67  -KG     Pretreatment Heart Rate (beats/min) 89  -KG     Posttreatment Heart Rate (beats/min) 95  -KG     Pre SpO2 (%) 95  -KG     O2 Delivery Pre Treatment room air  -KG     Post SpO2 (%) 96  -KG     O2 Delivery Post Treatment room air  -KG     Pre Patient Position Supine  -KG     Intra Patient Position Standing  -KG     Post Patient Position Sitting  -KG     Row Name 03/06/22 1613          Positioning and Restraints    Pre-Treatment Position in bed  -KG     Post Treatment Position chair  -KG     In Chair notified nsg;reclined;call light within reach;encouraged to call for assist;exit alarm on;RUE elevated;LUE elevated;legs elevated  -KG           User Key  (r) = Recorded By, (t) = Taken By, (c) = Cosigned By    Initials Name Provider Type    Triny Benson, PT Physical Therapist               Outcome Measures     Row Name 03/06/22 1614          How much help from another person do you currently need...    Turning  from your back to your side while in flat bed without using bedrails? 3  -KG     Moving from lying on back to sitting on the side of a flat bed without bedrails? 3  -KG     Moving to and from a bed to a chair (including a wheelchair)? 3  -KG     Standing up from a chair using your arms (e.g., wheelchair, bedside chair)? 3  -KG     Climbing 3-5 steps with a railing? 2  -KG     To walk in hospital room? 3  -KG     AM-PAC 6 Clicks Score (PT) 17  -KG     Row Name 03/06/22 1614          Functional Assessment    Outcome Measure Options AM-PAC 6 Clicks Basic Mobility (PT)  -KG           User Key  (r) = Recorded By, (t) = Taken By, (c) = Cosigned By    Initials Name Provider Type    KG Triny Scott, PT Physical Therapist                             Physical Therapy Education                 Title: PT OT SLP Therapies (In Progress)     Topic: Physical Therapy (In Progress)     Point: Mobility training (In Progress)     Learning Progress Summary           Patient Acceptance, E, NR by KG at 3/6/2022 1427    Acceptance, E, NR by AE at 3/3/2022 1020                   Point: Home exercise program (In Progress)     Learning Progress Summary           Patient Acceptance, E, NR by KG at 3/6/2022 1427    Acceptance, E, NR by AE at 3/3/2022 1020                   Point: Body mechanics (In Progress)     Learning Progress Summary           Patient Acceptance, E, NR by KG at 3/6/2022 1427    Acceptance, E, NR by AE at 3/3/2022 1020                   Point: Precautions (In Progress)     Learning Progress Summary           Patient Acceptance, E, NR by KG at 3/6/2022 1427    Acceptance, E, NR by AE at 3/3/2022 1020                               User Key     Initials Effective Dates Name Provider Type Discipline    KG 05/22/20 -  Triny Scott PT Physical Therapist PT    AE 09/21/21 -  Ron Alvarez PT Physical Therapist PT              PT Recommendation and Plan     Plan of Care Reviewed With: patient  Progress:  improving  Outcome Evaluation: Pt increased ambulation distance to 200ft with RW and CGA. VC's for upright posture and increased stride length. Pt with adequate balance and stability. Tendency to steer RW to the R with difficulty avoiding obstacles on that side. Able to correct with verbal cueing. Limited by fatigue. Continue to progress as appropriate.     Time Calculation:    PT Charges     Row Name 03/06/22 1427             Time Calculation    Start Time 1427  -KG      PT Received On 03/06/22  -KG      PT Goal Re-Cert Due Date 03/13/22  -KG              Time Calculation- PT    Total Timed Code Minutes- PT 24 minute(s)  -KG              Timed Charges    29846 - PT Therapeutic Activity Minutes 24  -KG              Total Minutes    Timed Charges Total Minutes 24  -KG       Total Minutes 24  -KG            User Key  (r) = Recorded By, (t) = Taken By, (c) = Cosigned By    Initials Name Provider Type    Triny Benson, PT Physical Therapist              Therapy Charges for Today     Code Description Service Date Service Provider Modifiers Qty    90988600587 HC PT THERAPEUTIC ACT EA 15 MIN 3/6/2022 Triny Scott, PT GP 2          PT G-Codes  Outcome Measure Options: AM-PAC 6 Clicks Basic Mobility (PT)  AM-PAC 6 Clicks Score (PT): 17  AM-PAC 6 Clicks Score (OT): 15    Su Scott PT  3/6/2022

## 2022-03-06 NOTE — PLAN OF CARE
Goal Outcome Evaluation:  Plan of Care Reviewed With: patient        Progress: improving  Outcome Evaluation: Pt increased ambulation distance to 200ft with RW and CGA. VC's for upright posture and increased stride length. Pt with adequate balance and stability. Tendency to steer RW to the R with difficulty avoiding obstacles on that side. Able to correct with verbal cueing. Limited by fatigue. Continue to progress as appropriate.

## 2022-03-06 NOTE — PROGRESS NOTES
[facility] Medicine Services  PROGRESS NOTE    Patient Name: Maryana Luevano  : 1960  MRN: [id number]    Date of Admission: 3/2/2022  Primary Care Physician: Son Choi DO    Subjective   Subjective     CC: Follow-up CVA    HPI: No acute events overnight, patient resting comfortably.    ROS:  Unable to obtain complete ROS secondary to aphasia    Objective   Objective     Vital Signs:   Temp:  [97 °F (36.1 °C)-98.5 °F (36.9 °C)] 98.2 °F (36.8 °C)  Heart Rate:  [76-86] 76  Resp:  [14-18] 16  BP: (124-142)/(66-77) 128/70     Physical Exam:  Constitutional: No acute distress, awake, alert  HENT: NCAT, mucous membranes moist  Respiratory: Clear to auscultation bilaterally, respiratory effort normal   Cardiovascular: RRR, no murmurs, rubs, or gallops  Gastrointestinal: Positive bowel sounds, soft, nontender, nondistended  Musculoskeletal: No bilateral ankle edema  Psychiatric: Appropriate affect, cooperative  Neurologic: Nonverbal, expressive aphasia  Skin: No rashes    Results Reviewed:  LAB RESULTS:      Lab 22  0931 22  0935 22  0712 22  0019 22  1855 22  1338 22  1337   WBC 7.75  --  6.95  --  9.00  --  6.22   HEMOGLOBIN 13.7  --  12.9  --  14.0  --  14.9   HEMATOCRIT 40.7  --  38.0  --  41.1  --  43.9   PLATELETS 243  --  243  --  262  --  255   NEUTROS ABS 5.24  --   --   --  6.77  --  3.86   IMMATURE GRANS (ABS) 0.03  --   --   --  0.03  --  0.03   LYMPHS ABS 1.76  --   --   --  1.70  --  1.78   MONOS ABS 0.57  --   --   --  0.41  --  0.42   EOS ABS 0.11  --   --   --  0.05  --  0.06   MCV 91.7  --  92.9  --  90.9  --  92.0   PROTIME  --   --   --  12.5  --  11.4*  --    APTT  --   --   --  22.6*  --   --  30.7   HEPARIN ANTI-XA  --  0.73*  --  0.10*  --   --   --          Lab 22  0931 22  1557 22  0712 22  1339 22  1337   SODIUM 133*  --  130*  --  135*   POTASSIUM 4.4 4.1 3.5  --  4.2   CHLORIDE 100  --   95*  --  98   CO2 21.0*  --  23.0  --  23.0   ANION GAP 12.0  --  12.0  --  14.0   BUN 9  --  8  --  9   CREATININE 0.56*  --  0.51* 0.50* 0.63   EGFR 103.3  --  105.7  --  100.4   GLUCOSE 185*  --  214*  --  249*   CALCIUM 9.7  --  9.5  --  9.8   MAGNESIUM 1.9  --  1.8  --   --    PHOSPHORUS  --   --  3.1  --   --    HEMOGLOBIN A1C  --   --  11.00*  --   --          Lab 03/04/22  0931   TOTAL PROTEIN 6.9   ALBUMIN 4.10   ALT (SGPT) 10   AST (SGOT) 15   BILIRUBIN 0.4   BILIRUBIN DIRECT <0.2   ALK PHOS 113         Lab 03/03/22  0019 03/02/22  1338 03/02/22  1337   TROPONIN T  --   --  <0.010   PROTIME 12.5 11.4*  --    INR 0.96 0.9  --          Lab 03/03/22  0712   CHOLESTEROL 268*   LDL CHOL 184*   HDL CHOL 49   TRIGLYCERIDES 187*             Brief Urine Lab Results     None          Microbiology Results Abnormal     Procedure Component Value - Date/Time    COVID PRE-OP / PRE-PROCEDURE SCREENING ORDER (NO ISOLATION) - Swab, Nasopharynx [[id number]]  (Normal) Collected: 03/02/22 1434    Lab Status: Final result Specimen: Swab from Nasopharynx Updated: 03/02/22 1456    Narrative:      The following orders were created for panel order COVID PRE-OP / PRE-PROCEDURE SCREENING ORDER (NO ISOLATION) - Swab, Nasopharynx.  Procedure                               Abnormality         Status                     ---------                               -----------         ------                     COVID-19, ABBOTT IN-HOUS...[[id number]]  Normal              Final result                 Please view results for these tests on the individual orders.    COVID-19, ABBOTT IN-HOUSE,NASAL Swab (NO TRANSPORT MEDIA) 2 HR TAT - Swab, Nasopharynx [[id number]]  (Normal) Collected: 03/02/22 1434    Lab Status: Final result Specimen: Swab from Nasopharynx Updated: 03/02/22 1456     COVID19 Presumptive Negative    Narrative:      Fact sheet for providers: https://www.fda.gov/media/668389/download     Fact sheet for patients:  https://www.fda.gov/media/182533/download    Test performed by PCR.  If inconsistent with clinical signs and symptoms patient should be tested with different authorized molecular test.          Adult Transesophageal Echo (LINDA) W/ Cont if Necessary Per Protocol    Result Date: 3/4/2022  · Estimated left ventricular EF = 60% Left ventricular systolic function is normal. · Saline test results are negative for intracardiac shunting. · The most likely source based on the study is the aortic atheroma in her aortic arch.  Anesthesia: Cath lab/Echo lab moderate sedation I was present with the patient for the duration of moderate sedation and supervised staff who had no other duties and monitored the patient for the entire procedure. Name of independent trained observer: Carine Guerrero RN Intra-service start time: 1005 Intra-service end time: 1032      Results for orders placed during the hospital encounter of 03/02/22    Adult Transesophageal Echo (LINDA) W/ Cont if Necessary Per Protocol    Interpretation Summary  · Estimated left ventricular EF = 60% Left ventricular systolic function is normal.  · Saline test results are negative for intracardiac shunting.  · The most likely source based on the study is the aortic atheroma in her aortic arch.    Anesthesia: Cath lab/Echo lab moderate sedation    I was present with the patient for the duration of moderate sedation and supervised staff who had no other duties and monitored the patient for the entire procedure.    Name of independent trained observer: Carine Guerrero RN  Intra-service start time: 1005  Intra-service end time: 1032      I have reviewed the medications:  Scheduled Meds:aspirin, 81 mg, Oral, Daily  atorvastatin, 80 mg, Oral, Nightly  clopidogrel, 75 mg, Oral, Daily  insulin detemir, 10 Units, Subcutaneous, Q12H  insulin lispro, 0-14 Units, Subcutaneous, 4x Daily With Meals & Nightly  OXcarbazepine, 300 mg, Oral, BID  sodium chloride, 10 mL, Intravenous,  Q12H      Continuous Infusions:niCARdipine, 5-15 mg/hr, Last Rate: 2.5 mg/hr (03/04/22 0125)      PRN Meds:.•  acetaminophen  •  potassium chloride  •  sodium chloride  •  sodium chloride    Assessment/Plan   Assessment & Plan     Active Hospital Problems    Diagnosis  POA   • **AIS [I63.9]  Yes   • Hypertension [I10]  Yes   • Dyslipidemia [E78.5]  Yes   • T2DM  [E11.9]  Yes   • Seizure disorder (on Trileptal) [G40.909]  Yes   • Depression [F32.A]  Yes      Resolved Hospital Problems   No resolved problems to display.        Brief Hospital Course to date:  Maryana Luevano is a 61 y.o. female history of type 2 diabetes, seizure disorder, depression, hypertension, hyperlipidemia patient presented 3/2/2022 with acute CVA s/p mechanical thrombectomy, requiring ICU care post procedure.  Progressed well and was transferred to telemetry for ongoing care management.     Acute ischemic stroke s/p mechanical thrombectomy  -Neurology following, continue statin DAPT x30 days, will need 30-day event monitor at discharge  -LINDA done, no evidence of PFO/ASD, no source of embolus found  -PT/OT following, anticipate discharge to rehab  -Follow-up in neurology clinic 6 weeks post discharge     Hypertension  -BP currently stable, not on any antihypertensives at home, continue to closely monitor     Seizure disorder-continue Trileptal     Type 2 diabetes  -FSBG is reviewed.  Continue basal, SSI     Depression-resume home Cymbalta     DVT prophylaxis:  Mechanical DVT prophylaxis orders are present.       AM-PAC 6 Clicks Score (PT): 23 (03/04/22 0800)    Disposition: TBD, currently awaiting d/c to rehab once arranged, CM following    CODE STATUS:   Code Status and Medical Interventions:   Ordered at: 03/02/22 5849     Code Status (Patient has no pulse and is not breathing):    CPR (Attempt to Resuscitate)     Medical Interventions (Patient has pulse or is breathing):    Full Support       Geena Blanton MD  03/06/22

## 2022-03-07 LAB
GLUCOSE BLDC GLUCOMTR-MCNC: 109 MG/DL (ref 70–130)
GLUCOSE BLDC GLUCOMTR-MCNC: 175 MG/DL (ref 70–130)
GLUCOSE BLDC GLUCOMTR-MCNC: 182 MG/DL (ref 70–130)
GLUCOSE BLDC GLUCOMTR-MCNC: 248 MG/DL (ref 70–130)

## 2022-03-07 PROCEDURE — 97116 GAIT TRAINING THERAPY: CPT

## 2022-03-07 PROCEDURE — 63710000001 INSULIN DETEMIR PER 5 UNITS: Performed by: INTERNAL MEDICINE

## 2022-03-07 PROCEDURE — 82962 GLUCOSE BLOOD TEST: CPT

## 2022-03-07 PROCEDURE — 97535 SELF CARE MNGMENT TRAINING: CPT

## 2022-03-07 PROCEDURE — 97530 THERAPEUTIC ACTIVITIES: CPT

## 2022-03-07 PROCEDURE — 99232 SBSQ HOSP IP/OBS MODERATE 35: CPT | Performed by: INTERNAL MEDICINE

## 2022-03-07 PROCEDURE — 63710000001 INSULIN LISPRO (HUMAN) PER 5 UNITS: Performed by: INTERNAL MEDICINE

## 2022-03-07 RX ADMIN — INSULIN DETEMIR 10 UNITS: 100 INJECTION, SOLUTION SUBCUTANEOUS at 19:41

## 2022-03-07 RX ADMIN — INSULIN DETEMIR 10 UNITS: 100 INJECTION, SOLUTION SUBCUTANEOUS at 08:42

## 2022-03-07 RX ADMIN — INSULIN LISPRO 3 UNITS: 100 INJECTION, SOLUTION INTRAVENOUS; SUBCUTANEOUS at 17:59

## 2022-03-07 RX ADMIN — INSULIN LISPRO 5 UNITS: 100 INJECTION, SOLUTION INTRAVENOUS; SUBCUTANEOUS at 13:31

## 2022-03-07 RX ADMIN — ATORVASTATIN CALCIUM 80 MG: 40 TABLET, FILM COATED ORAL at 19:34

## 2022-03-07 RX ADMIN — OXCARBAZEPINE 300 MG: 300 TABLET, FILM COATED ORAL at 19:34

## 2022-03-07 RX ADMIN — Medication 10 ML: at 19:34

## 2022-03-07 RX ADMIN — ASPIRIN 81 MG 81 MG: 81 TABLET ORAL at 08:42

## 2022-03-07 RX ADMIN — OXCARBAZEPINE 300 MG: 300 TABLET, FILM COATED ORAL at 08:42

## 2022-03-07 RX ADMIN — Medication 10 ML: at 08:45

## 2022-03-07 RX ADMIN — CLOPIDOGREL BISULFATE 75 MG: 75 TABLET ORAL at 08:42

## 2022-03-07 RX ADMIN — INSULIN LISPRO 2 UNITS: 100 INJECTION, SOLUTION INTRAVENOUS; SUBCUTANEOUS at 19:41

## 2022-03-07 NOTE — PLAN OF CARE
A&Ox4, mood/affect appropriate. Speech garbled/incoherent at times. Lung sound clear bilaterally. NIH 4 for aphasia/dysarthria. No overt deficit noted in gross motor mobility. VSS. SR on cardiac mx. Patient denies pain/discomfort at this time. Will cont to mx. Call light in reach.

## 2022-03-07 NOTE — PLAN OF CARE
Problem: Adult Inpatient Plan of Care  Goal: Plan of Care Review  Recent Flowsheet Documentation  Taken 3/7/2022 1411 by Vilma Farah, PT  Progress: improving  Plan of Care Reviewed With: patient  Outcome Evaluation: Patient increased gait distance to 350 feet with RW, CGA, step through gait pattern, limited by fatigue. Good participation with standing LE ther ex, no LOB. Improved ability to correctly manage and steer RW with ambulation. Will continue to progress as able.   Goal Outcome Evaluation:  Plan of Care Reviewed With: patient        Progress: improving  Outcome Evaluation: Patient increased gait distance to 350 feet with RW, CGA, step through gait pattern, limited by fatigue. Good participation with standing LE ther ex, no LOB. Improved ability to correctly manage and steer RW with ambulation. Will continue to progress as able.

## 2022-03-07 NOTE — THERAPY TREATMENT NOTE
Patient Name: Maryana Luevano  : 1960    MRN: [id number]                              Today's Date: 3/7/2022       Admit Date: 3/2/2022    Visit Dx:     ICD-10-CM ICD-9-CM   1. Oropharyngeal dysphagia  R13.12 787.22   2. Acute ischemic stroke (HCC)  I63.9 434.91   3. Expressive aphasia  R47.01 784.3   4. Right-sided sensory deficit present  R44.9 781.99   5. Facial weakness, post-stroke  I69.392 438.83     Patient Active Problem List   Diagnosis   • AIS   • Hypertension   • Dyslipidemia   • T2DM    • Seizure disorder (on Trileptal)   • Depression     Past Medical History:   Diagnosis Date   • Depression 3/2/2022   • Dyslipidemia 3/2/2022   • Hypertension 3/2/2022   • Seizure disorder (on Trileptal) 3/2/2022   • T2DM  3/2/2022     Past Surgical History:   Procedure Laterality Date   • INTERVENTIONAL RADIOLOGY PROCEDURE Bilateral 3/2/2022    Procedure: CAROTID CEREBRAL ANGIOGRAM BILATERAL;  Surgeon: Hamilton Nelson MD;  Location: [facility] INVASIVE LOCATION;  Service: Interventional Radiology;  Laterality: Bilateral;      General Information     Row Name 22 141          Physical Therapy Time and Intention    Document Type therapy note (daily note)  -LR     Mode of Treatment physical therapy;individual therapy  -LR     Row Name 22 141          General Information    Patient Profile Reviewed yes  -LR     Existing Precautions/Restrictions fall;other (see comments)  expressive aphasia, R visual field cut  -LR     Barriers to Rehab medically complex  communication barrier (expressive aphasia)  -LR     Row Name 22 141          Cognition    Orientation Status (Cognition) oriented x 4  -LR     Row Name 22 141          Safety Issues, Functional Mobility    Safety Issues Affecting Function (Mobility) awareness of need for assistance;insight into deficits/self-awareness;positioning of assistive device;safety precautions follow-through/compliance;safety precaution awareness;sequencing abilities   -LR     Impairments Affecting Function (Mobility) balance;strength;endurance/activity tolerance;visual/perceptual  -LR           User Key  (r) = Recorded By, (t) = Taken By, (c) = Cosigned By    Initials Name Provider Type    Vilma Pablo PT Physical Therapist               Mobility     Row Name 03/07/22 1411          Bed Mobility    Supine-Sit [place] (Bed Mobility) not tested  -LR     Comment, (Bed Mobility) [facility] on arrival and at end of treatment.  -LR     Row Name 03/07/22 1411          Transfers    Comment, (Transfers) Verbal cues for correct hand placement with t/f  -LR     Row Name 03/07/22 1411          Bed-Chair Transfer    Bed-Chair [place] (Transfers) not tested  -LR     Row Name 03/07/22 1411          Sit-Stand Transfer    Sit-Stand [place] (Transfers) verbal cues;standby assist  -LR     Assistive Device (Sit-Stand Transfers) walker, front-wheeled  -LR     Row Name 03/07/22 1411          Gait/Stairs (Locomotion)    [place] Level (Gait) verbal cues;contact guard  -LR     Assistive Device (Gait) walker, front-wheeled  -LR     Distance in Feet (Gait) 350  -LR     Deviations/Abnormal Patterns (Gait) bilateral deviations;keily decreased;stride length decreased  -LR     Bilateral Gait Deviations forward flexed posture  -LR     [place] Level (Stairs) not tested  -LR     Comment, (Gait/Stairs) Patient ambulated with step through gait pattern at normal pace. Verbal cues for increased step length. Cues for correction of veer to the right. Demonstrated improved ability to manage and steer RW straight today. Gait limited by fatigue.  -LR           User Key  (r) = Recorded By, (t) = Taken By, (c) = Cosigned By    Initials Name Provider Type    Vilma Pablo PT Physical Therapist               Obj/Interventions     Row Name 03/07/22 1411          Motor Skills    Therapeutic Exercise ankle;knee;hip;other (see comments)  cues for technique; no assist required, no LOB  with standing LE ther ex  -LR     Row Name 03/07/22 1411          Hip (Therapeutic Exercise)    Hip (Therapeutic Exercise) strengthening exercise  -LR     Hip Strengthening (Therapeutic Exercise) bilateral;aBduction;mini squats;standing;10 repetitions;extension  -LR     Row Name 03/07/22 1411          Knee (Therapeutic Exercise)    Knee (Therapeutic Exercise) strengthening exercise  -LR     Knee Strengthening (Therapeutic Exercise) bilateral;hamstring curls;standing;marching while standing;10 repetitions  standing calf raises  -LR     Row Name 03/07/22 1411          Balance    Balance Assessment sitting static balance;sitting dynamic balance;standing static balance;standing dynamic balance  -LR     Static Sitting Balance independent  -LR     Dynamic Sitting Balance independent  -LR     Position, Sitting Balance unsupported;sitting in chair  -LR     Static Standing Balance supervision  -LR     Dynamic Standing Balance contact guard  -LR     Position/Device Used, Standing Balance supported;walker, front-wheeled  -LR           User Key  (r) = Recorded By, (t) = Taken By, (c) = Cosigned By    Initials Name Provider Type    LR Vilma Farah, PT Physical Therapist               Goals/Plan     Row Name 03/07/22 1411          Bed Mobility Goal 1 (PT)    Activity/Assistive Device (Bed Mobility Goal 1, PT) sit to supine/supine to sit  -LR     [place] Level/Cues Needed (Bed Mobility Goal 1, PT) supervision required  -LR     Time Frame (Bed Mobility Goal 1, PT) long term goal (LTG);2 weeks  -LR     Progress/Outcomes (Bed Mobility Goal 1, PT) goal ongoing  -LR     Row Name 03/07/22 1411          Transfer Goal 1 (PT)    Activity/Assistive Device (Transfer Goal 1, PT) sit-to-stand/stand-to-sit;bed-to-chair/chair-to-bed;walker, rolling  -LR     [place] Level/Cues Needed (Transfer Goal 1, PT) supervision required  -LR     Time Frame (Transfer Goal 1, PT) long term goal (LTG);2 weeks  -LR     Progress/Outcome  (Transfer Goal 1, PT) goal ongoing;good progress toward goal  -LR     Row Name 03/07/22 1411          Gait Training Goal 1 (PT)    Activity/Assistive Device (Gait Training Goal 1, PT) gait (walking locomotion);walker, rolling  -LR     [place] Level (Gait Training Goal 1, PT) modified independence  -LR     Distance (Gait Training Goal 1, PT) 500 feet  -LR     Time Frame (Gait Training Goal 1, PT) long term goal (LTG);2 weeks  -LR     Progress/Outcome (Gait Training Goal 1, PT) good progress toward goal;goal met;goal revised this date;goal ongoing  -LR           User Key  (r) = Recorded By, (t) = Taken By, (c) = Cosigned By    Initials Name Provider Type    LR Vilma Farah, PT Physical Therapist               Clinical Impression     Row Name 03/07/22 1411          Pain    Pretreatment Pain Rating 0/10 - no pain  -LR     Posttreatment Pain Rating 0/10 - no pain  -LR     Pain Intervention(s) Ambulation/increased activity;Repositioned  -LR     Row Name 03/07/22 1411          Plan of Care Review    Plan of Care Reviewed With patient  -LR     Progress improving  -LR     Outcome Evaluation Patient increased gait distance to 350 feet with RW, CGA, step through gait pattern, limited by fatigue. Good participation with standing LE ther ex, no LOB. Improved ability to correctly manage and steer RW with ambulation. Will continue to progress as able.  -LR     Row Name 03/07/22 1411          Therapy Assessment/Plan (PT)    Rehab Potential (PT) good, to achieve stated therapy goals  -LR     Criteria for Skilled Interventions Met (PT) yes;meets criteria;skilled treatment is necessary  -LR     Row Name 03/07/22 1411          Vital Signs    Pre Systolic BP Rehab 137  -LR     Pre Treatment Diastolic BP 78  -LR     Post Systolic BP Rehab 140  -LR     Post Treatment Diastolic BP 76  -LR     Pretreatment Heart Rate (beats/min) 85  -LR     Posttreatment Heart Rate (beats/min) 90  -LR     Pre Patient Position Sitting  -LR      Post Patient Position Sitting  -LR     Row Name 03/07/22 1411          Positioning and Restraints    Pre-Treatment Position sitting in chair/recliner  -LR     Post Treatment Position chair  -LR     In Chair notified nsg;reclined;sitting;call light within reach;encouraged to call for assist;exit alarm on;with family/caregiver;legs elevated  -LR           User Key  (r) = Recorded By, (t) = Taken By, (c) = Cosigned By    Initials Name Provider Type    LR Vilma Farah, PT Physical Therapist               Outcome Measures     Row Name 03/07/22 1411 03/07/22 0800       How much help from another person do you currently need...    Turning from your back to your side while in flat bed without using bedrails? 4  -LR 3  -LM    Moving from lying on back to sitting on the side of a flat bed without bedrails? 3  -LR 3  -LM    Moving to and from a bed to a chair (including a wheelchair)? 3  -LR 3  -LM    Standing up from a chair using your arms (e.g., wheelchair, bedside chair)? 3  -LR 3  -LM    Climbing 3-5 steps with a railing? 3  -LR 2  -LM    To walk in hospital room? 3  -LR 3  -LM    AM-PAC 6 Clicks Score (PT) 19  -LR 17  -LM    Row Name 03/07/22 1411 03/07/22 1138       Functional Assessment    Outcome Measure Options AM-PAC 6 Clicks Basic Mobility (PT)  -LR AM-PAC 6 Clicks Daily Activity (OT)  -KF          User Key  (r) = Recorded By, (t) = Taken By, (c) = Cosigned By    Initials Name Provider Type    Vilma Pablo, PT Physical Therapist    Erinn Arshad, RN Registered Nurse    Haily Haider, OT Occupational Therapist                             Physical Therapy Education                 Title: PT OT SLP Therapies (In Progress)     Topic: Physical Therapy (Done)     Point: Mobility training (Done)     Learning Progress Summary           Patient Acceptance, E,D, VU,NR by LR at 3/7/2022 1411    Comment: Educated on safety with mobility, LE strengthening, correct sit<->stand t/f  technique, correct gait mechanics, and progression of POC.    Acceptance, E, NR by KG at 3/6/2022 1427    Acceptance, E, NR by AE at 3/3/2022 1020   Family Acceptance, E,D, VU,NR by LR at 3/7/2022 1411    Comment: Educated on safety with mobility, LE strengthening, correct sit<->stand t/f technique, correct gait mechanics, and progression of POC.                   Point: Home exercise program (Done)     Learning Progress Summary           Patient Acceptance, E,D, VU,NR by LR at 3/7/2022 1411    Comment: Educated on safety with mobility, LE strengthening, correct sit<->stand t/f technique, correct gait mechanics, and progression of POC.    Acceptance, E, NR by KG at 3/6/2022 1427    Acceptance, E, NR by AE at 3/3/2022 1020   Family Acceptance, E,D, VU,NR by LR at 3/7/2022 1411    Comment: Educated on safety with mobility, LE strengthening, correct sit<->stand t/f technique, correct gait mechanics, and progression of POC.                   Point: Body mechanics (Done)     Learning Progress Summary           Patient Acceptance, E,D, VU,NR by LR at 3/7/2022 1411    Comment: Educated on safety with mobility, LE strengthening, correct sit<->stand t/f technique, correct gait mechanics, and progression of POC.    Acceptance, E, NR by KG at 3/6/2022 1427    Acceptance, E, NR by AE at 3/3/2022 1020   Family Acceptance, E,D, VU,NR by LR at 3/7/2022 1411    Comment: Educated on safety with mobility, LE strengthening, correct sit<->stand t/f technique, correct gait mechanics, and progression of POC.                   Point: Precautions (Done)     Learning Progress Summary           Patient Acceptance, E,D, VU,NR by LR at 3/7/2022 1411    Comment: Educated on safety with mobility, LE strengthening, correct sit<->stand t/f technique, correct gait mechanics, and progression of POC.    Acceptance, E, NR by KG at 3/6/2022 1427    Acceptance, E, NR by AE at 3/3/2022 1020   Family Acceptance, E,D, VU,NR by LR at 3/7/2022 1411     Comment: Educated on safety with mobility, LE strengthening, correct sit<->stand t/f technique, correct gait mechanics, and progression of POC.                               User Key     Initials Effective Dates Name Provider Type Discipline    LR 06/16/21 -  Vilma Farah, PT Physical Therapist PT    KG 05/22/20 -  Triny Scott PT Physical Therapist PT    AE 09/21/21 -  Ron Alvarez, PT Physical Therapist PT              PT Recommendation and Plan     Plan of Care Reviewed With: patient  Progress: improving  Outcome Evaluation: Patient increased gait distance to 350 feet with RW, CGA, step through gait pattern, limited by fatigue. Good participation with standing LE ther ex, no LOB. Improved ability to correctly manage and steer RW with ambulation. Will continue to progress as able.     Time Calculation:    PT Charges     Row Name 03/07/22 1411             Time Calculation    Start Time 1411  -LR      PT Received On 03/07/22  -LR      PT Goal Re-Cert Due Date 03/13/22  -LR              Timed Charges    23165 - PT Therapeutic Exercise Minutes 6  -LR      13752 - Gait Training Minutes  9  -LR              Total Minutes    Timed Charges Total Minutes 15  -LR       Total Minutes 15  -LR            User Key  (r) = Recorded By, (t) = Taken By, (c) = Cosigned By    Initials Name Provider Type    LR Vilma Farah, PT Physical Therapist              Therapy Charges for Today     Code Description Service Date Service Provider Modifiers Qty    [id number] HC GAIT TRAINING EA 15 MIN 3/7/2022 Vilma Farah, PT GP 1          PT G-Codes  Outcome Measure Options: AM-PAC 6 Clicks Basic Mobility (PT)  AM-PAC 6 Clicks Score (PT): 19  AM-PAC 6 Clicks Score (OT): 19    Vilma Farah PT  3/7/2022

## 2022-03-07 NOTE — CASE MANAGEMENT/SOCIAL WORK
Case Management Discharge Note      Final Note: Per Jen at [facility], they have an acute rehab bed available and insurance approval for transfer to an acute rehab bed tomorrow 3/8. Ms Luevano is in agreement with plan. [facility] wheelchair van will transport at 1130, call report to [phone]         Selected Continued Care - Admitted Since 3/2/2022     Destination Coordination complete.    Service Provider Selected Services Address Phone Fax Patient Preferred    [facility]  Inpatient Rehabilitation [address] [address] [phone] --          Durable Medical Equipment    No services have been selected for the patient.              Dialysis/Infusion    No services have been selected for the patient.              Home Medical Care    No services have been selected for the patient.              Therapy    No services have been selected for the patient.              Community Resources    No services have been selected for the patient.              Community & DME    No services have been selected for the patient.                       Final Discharge Disposition Code: 62 - inpatient rehab facility

## 2022-03-07 NOTE — THERAPY TREATMENT NOTE
Patient Name: Maryana Luevano  : 1960    MRN: [id number]                              Today's Date: 3/7/2022       Admit Date: 3/2/2022    Visit Dx:     ICD-10-CM ICD-9-CM   1. Oropharyngeal dysphagia  R13.12 787.22   2. Acute ischemic stroke (HCC)  I63.9 434.91   3. Expressive aphasia  R47.01 784.3   4. Right-sided sensory deficit present  R44.9 781.99   5. Facial weakness, post-stroke  I69.392 438.83     Patient Active Problem List   Diagnosis   • AIS   • Hypertension   • Dyslipidemia   • T2DM    • Seizure disorder (on Trileptal)   • Depression     Past Medical History:   Diagnosis Date   • Depression 3/2/2022   • Dyslipidemia 3/2/2022   • Hypertension 3/2/2022   • Seizure disorder (on Trileptal) 3/2/2022   • T2DM  3/2/2022     Past Surgical History:   Procedure Laterality Date   • INTERVENTIONAL RADIOLOGY PROCEDURE Bilateral 3/2/2022    Procedure: CAROTID CEREBRAL ANGIOGRAM BILATERAL;  Surgeon: Hamilton Nelson MD;  Location: [facility] INVASIVE LOCATION;  Service: Interventional Radiology;  Laterality: Bilateral;      General Information     Row Name 22 1129          OT Time and Intention    Document Type therapy note (daily note)  -KF     Mode of Treatment occupational therapy  -KF     Row Name 22 1129          General Information    Patient Profile Reviewed yes  -KF     Existing Precautions/Restrictions fall;other (see comments)  exp aphasia  -KF     Barriers to Rehab medically complex;language barrier  -KF     Row Name 22 1129          Cognition    Orientation Status (Cognition) oriented x 4  -KF     Row Name 22 1129          Safety Issues, Functional Mobility    Safety Issues Affecting Function (Mobility) insight into deficits/self-awareness;awareness of need for assistance  -KF     Impairments Affecting Function (Mobility) balance;coordination;endurance/activity tolerance;postural/trunk control;strength;visual/perceptual  -KF     Comment, Safety Issues/Impairments (Mobility) Pt  reports vision improvement this date  -           User Key  (r) = Recorded By, (t) = Taken By, (c) = Cosigned By    Initials Name Provider Type    KF Haily Saenz, OT Occupational Therapist                 Mobility/ADL's     Row Name 03/07/22 1130          Bed Mobility    Bed Mobility scooting/bridging;supine-sit  -KF     Scooting/Bridging [place] (Bed Mobility) modified independence  -KF     Supine-Sit [place] (Bed Mobility) modified independence  -KF     Assistive Device (Bed Mobility) bed rails;head of bed elevated  -     Comment, (Bed Mobility) good safety throughout and no cues required  -     Row Name 03/07/22 1130          Transfers    Transfers sit-stand transfer;toilet transfer;bed-chair transfer  -     Comment, (Transfers) min Vc's for seq and FWW management  -     Bed-Chair [place] (Transfers) standby assist  -     Assistive Device (Bed-Chair Transfers) walker, front-wheeled  -     Sit-Stand [place] (Transfers) standby assist  -     [place] Level (Toilet Transfer) standby assist;verbal cues  -     Assistive Device (Toilet Transfer) commode;walker, front-wheeled;raised toilet seat;grab bars/safety frame  -     Row Name 03/07/22 1130          Sit-Stand Transfer    Assistive Device (Sit-Stand Transfers) walker, front-wheeled  -     Row Name 03/07/22 1130          Toilet Transfer    Type (Toilet Transfer) stand-sit;sit-stand  -     Comment, (Toilet Transfer) good safety throughout  -     Row Name 03/07/22 1130          Functional Mobility    Functional Mobility- Ind. Level contact guard assist;1 person;verbal cues required  -     Functional Mobility- Device rolling walker  -     Functional Mobility-Distance (Feet) 20  -KF     Functional Mobility- Safety Issues sequencing ability decreased  -KF     Functional Mobility- Comment min VC's for FWW management with good improvement post education; no LOB  -     Row Name 03/07/22 1130          Activities  of Daily Living    BADL Assessment/Intervention upper body dressing;grooming;toileting;lower body dressing;bathing  -KF     Row Name 03/07/22 1130          Upper Body Dressing Assessment/Training    [place] Level (Upper Body Dressing) don;front opening garment;set up;supervision;verbal cues  -KF     Position (Upper Body Dressing) edge of bed sitting  -KF     Row Name 03/07/22 1130          Lower Body Dressing Assessment/Training    [place] Level (Lower Body Dressing) don;doff;socks;set up  -KF     Position (Lower Body Dressing) unsupported sitting  -KF     Row Name 03/07/22 1130          Grooming Assessment/Training    [place] Level (Grooming) oral care regimen;wash face, hands;standby assist  -KF     Position (Grooming) sink side;supported standing  -KF     Row Name 03/07/22 1130          Toileting Assessment/Training    [place] Level (Toileting) adjust/manage clothing;perform perineal hygiene;supervision;verbal cues;set up  -KF     Assistive Devices (Toileting) commode  -KF     Position (Toileting) supported sitting;supported standing  -KF     Row Name 03/07/22 1130          Bathing Assessment/Intervention    [place] Level (Bathing) upper body;perineal area;standby assist;verbal cues  -KF     Position (Bathing) supported standing  -KF           User Key  (r) = Recorded By, (t) = Taken By, (c) = Cosigned By    Initials Name Provider Type    Haily Haider OT Occupational Therapist               Obj/Interventions     Row Name 03/07/22 1133          Balance    Balance Assessment sitting static balance;sitting dynamic balance;standing static balance;standing dynamic balance  -KF     Static Sitting Balance independent  -KF     Dynamic Sitting Balance independent  -KF     Position, Sitting Balance unsupported;sitting edge of bed  -KF     Static Standing Balance supervision  -KF     Dynamic Standing Balance contact guard  -KF     Balance Interventions sit to  stand;standing;supported;occupation based/functional task;weight shifting activity;UE activity with balance activity  -KF     Comment, Balance good use of UEs in standing for ADL tasks at FWW, no LOB throughout  -KF           User Key  (r) = Recorded By, (t) = Taken By, (c) = Cosigned By    Initials Name Provider Type    KF Haily Saenz, OT Occupational Therapist               Goals/Plan    No documentation.                Clinical Impression     Row Name 03/07/22 1134          Pain Assessment    Pretreatment Pain Rating 0/10 - no pain  -KF     Posttreatment Pain Rating 0/10 - no pain  -KF     Pre/Posttreatment Pain Comment tolerated  -KF     Pain Intervention(s) Repositioned;Ambulation/increased activity  -     Row Name 03/07/22 1131          Plan of Care Review    Plan of Care Reviewed With patient  -KF     Progress improving  -     Outcome Evaluation Pt with good progress completing 20ft functional mob CGA at [facility], SBA sink side grooming/bathing tasks tolerated at [facility] with good balance, setup/SUP toileting at elevated toilet, setup LBD socks, cont IPOT per POC  -     Row Name 03/07/22 1131          Therapy Assessment/Plan (OT)    Rehab Potential (OT) good, to achieve stated therapy goals  -     Criteria for Skilled Therapeutic Interventions Met (OT) yes;skilled treatment is necessary  -KF     Therapy Frequency (OT) daily  -     Row Name 03/07/22 1130          Therapy Plan Review/Discharge Plan (OT)    Anticipated Discharge Disposition (OT) inpatient rehabilitation facility  -     Row Name 03/07/22 1135          Vital Signs    Pre Systolic BP Rehab 133  -KF     Pre Treatment Diastolic BP 66  -KF     Post Systolic BP Rehab 126  -KF     Post Treatment Diastolic BP 61  -KF     O2 Delivery Pre Treatment room air  -KF     Pre Patient Position Supine  -KF     Intra Patient Position Standing  -KF     Post Patient Position Sitting  -KF     Rest Breaks  1  -KF     Row Name 03/07/22 3794           Positioning and Restraints    Pre-Treatment Position in bed  -KF     Post Treatment Position chair  -KF     In Chair notified nsg;reclined;sitting;call light within reach;encouraged to call for assist;exit alarm on;waffle cushion;legs elevated  -KF           User Key  (r) = Recorded By, (t) = Taken By, (c) = Cosigned By    Initials Name Provider Type    Haily Haider OT Occupational Therapist               Outcome Measures     Row Name 03/07/22 1138          How much help from another is currently needed...    Putting on and taking off regular lower body clothing? 3  -KF     Bathing (including washing, rinsing, and drying) 3  -KF     Toileting (which includes using toilet bed pan or urinal) 3  -KF     Putting on and taking off regular upper body clothing 3  -KF     Taking care of personal grooming (such as brushing teeth) 3  -KF     Eating meals 4  -KF     AM-PAC 6 Clicks Score (OT) 19  -KF     Row Name 03/07/22 0800          How much help from another person do you currently need...    Turning from your back to your side while in flat bed without using bedrails? 3  -LM     Moving from lying on back to sitting on the side of a flat bed without bedrails? 3  -LM     Moving to and from a bed to a chair (including a wheelchair)? 3  -LM     Standing up from a chair using your arms (e.g., wheelchair, bedside chair)? 3  -LM     Climbing 3-5 steps with a railing? 2  -LM     To walk in hospital room? 3  -LM     AM-PAC 6 Clicks Score (PT) 17  -LM     Row Name 03/07/22 1138          Functional Assessment    Outcome Measure Options AM-PAC 6 Clicks Daily Activity (OT)  -KF           User Key  (r) = Recorded By, (t) = Taken By, (c) = Cosigned By    Initials Name Provider Type    Erinn Arshad RN Registered Nurse    Haily Haider OT Occupational Therapist                Occupational Therapy Education                 Title: PT OT SLP Therapies (In Progress)     Topic: Occupational Therapy (In Progress)      Point: ADL training (Done)     Description:   Instruct learner(s) on proper safety adaptation and remediation techniques during self care or transfers.   Instruct in proper use of assistive devices.              Learning Progress Summary           Patient Acceptance, E,TB,D, VU,DU,NR by KF at 3/7/2022 1139    Acceptance, E, NR by MA at 3/3/2022 1013                   Point: Home exercise program (Not Started)     Description:   Instruct learner(s) on appropriate technique for monitoring, assisting and/or progressing therapeutic exercises/activities.              Learner Progress:  Not documented in this visit.          Point: Precautions (Done)     Description:   Instruct learner(s) on prescribed precautions during self-care and functional transfers.              Learning Progress Summary           Patient Acceptance, E,TB,D, VU,DU,NR by  at 3/7/2022 1139    Acceptance, E, NR by MA at 3/3/2022 1013                   Point: Body mechanics (Done)     Description:   Instruct learner(s) on proper positioning and spine alignment during self-care, functional mobility activities and/or exercises.              Learning Progress Summary           Patient Acceptance, E,TB,D, VU,DU,NR by KF at 3/7/2022 1139    Acceptance, E, NR by MA at 3/3/2022 1013                               User Key     Initials Effective Dates Name Provider Type Discipline     06/16/21 -  Haily Saenz, OT Occupational Therapist OT    MA 11/19/20 -  Kailee Alcaraz OT Occupational Therapist OT              OT Recommendation and Plan  Therapy Frequency (OT): daily  Plan of Care Review  Plan of Care Reviewed With: patient  Progress: improving  Outcome Evaluation: Pt with good progress completing 20ft functional mob CGA at FWW, SBA sink side grooming/bathing tasks tolerated at [facility] with good balance, setup/SUP toileting at elevated toilet, setup LBD socks, cont IPOT per POC     Time Calculation:    Time Calculation- OT     Row Name 03/07/22  1100             Time Calculation- OT    OT Start Time 1100  -KF      OT Received On 03/07/22  -KF              Timed Charges    77029 - OT Therapeutic Activity Minutes 10  -KF      53859 - OT Self Care/Mgmt Minutes 15  -KF              Total Minutes    Timed Charges Total Minutes 25  -KF       Total Minutes 25  -KF            User Key  (r) = Recorded By, (t) = Taken By, (c) = Cosigned By    Initials Name Provider Type    KF Haily Saenz OT Occupational Therapist              Therapy Charges for Today     Code Description Service Date Service Provider Modifiers Qty    77842076773 HC OT SELF CARE/MGMT/TRAIN EA 15 MIN 3/7/2022 Haily Saenz OT GO 1    22212940672 HC OT THERAPEUTIC ACT EA 15 MIN 3/7/2022 Haily Saenz OT GO 1               Haily Saenz OT  3/7/2022

## 2022-03-07 NOTE — ED PROVIDER NOTES
Subjective   Pt is a pleasant 61 year old female who presents today with difficulty speaking, right sided sensory deficits, and right sided visual deficits.  Symtpoms began early in the evening yesterday, exact time unknown.  She presented to an outside facility where she states she had an MRI performed that was normal (upon further eval, this was actually at CT scan).  She was discharged.  Symptoms persisted today resulting in her visit to the ED today.          Stroke  Presenting symptoms: language symptoms, sensory loss and visual change    Onset quality:  Sudden  Timing:  Constant  Progression:  Unchanged  Similar to previous episodes: no    Associated symptoms: no chest pain, no fever, no bladder incontinence, no neck pain, no seizures (History of seizures.  No seizures associated with today's presentation.) and no vomiting        Review of Systems   Constitutional: Negative for fever.   Cardiovascular: Negative for chest pain.   Gastrointestinal: Negative for vomiting.   Genitourinary: Negative for bladder incontinence.   Musculoskeletal: Negative for neck pain.   Neurological: Negative for seizures (History of seizures.  No seizures associated with today's presentation.).   All other systems reviewed and are negative.      Past Medical History:   Diagnosis Date   • Depression 3/2/2022   • Dyslipidemia 3/2/2022   • Hypertension 3/2/2022   • Seizure disorder (on Trileptal) 3/2/2022   • T2DM  3/2/2022       No Known Allergies    Past Surgical History:   Procedure Laterality Date   • INTERVENTIONAL RADIOLOGY PROCEDURE Bilateral 3/2/2022    Procedure: CAROTID CEREBRAL ANGIOGRAM BILATERAL;  Surgeon: Hamilton Nelson MD;  Location: [facility] INVASIVE LOCATION;  Service: Interventional Radiology;  Laterality: Bilateral;       History reviewed. No pertinent family history.    Social History     Socioeconomic History   • Marital status:    Tobacco Use   • Smoking status: Never Smoker   • Smokeless tobacco:  Never Used           Objective   Physical Exam  Vitals and nursing note reviewed.   Constitutional:       Appearance: Normal appearance.   HENT:      Head: Normocephalic and atraumatic.      Mouth/Throat:      Mouth: Mucous membranes are moist.   Eyes:      Extraocular Movements: Extraocular movements intact.      Pupils: Pupils are equal, round, and reactive to light.   Cardiovascular:      Rate and Rhythm: Normal rate and regular rhythm.      Pulses: Normal pulses.      Heart sounds: Normal heart sounds. No murmur heard.  Pulmonary:      Effort: Pulmonary effort is normal. No respiratory distress.      Breath sounds: Normal breath sounds. No wheezing or rhonchi.   Abdominal:      General: Bowel sounds are normal. There is no distension.      Palpations: Abdomen is soft.      Tenderness: There is no abdominal tenderness. There is no guarding or rebound.   Musculoskeletal:         General: Normal range of motion.      Cervical back: Normal range of motion.   Skin:     General: Skin is warm and dry.      Capillary Refill: Capillary refill takes less than 2 seconds.   Neurological:      Mental Status: She is alert.      Comments: Significant expressive aphasia.  Mild right sided sensory deficit.  Pt is sensitive to light touch, but slightly less pronounced on the left.  Asymmetric smile with mild right facial weakness.         Critical Care  Performed by: Cruz Zepeda DO  Authorized by: Cruz Zepeda DO     Critical care provider statement:     Critical care time (minutes):  35    Critical care time was exclusive of:  Separately billable procedures and treating other patients and teaching time    Critical care was necessary to treat or prevent imminent or life-threatening deterioration of the following conditions:  CNS failure or compromise    Critical care was time spent personally by me on the following activities:  Development of treatment plan with patient or surrogate, discussions with consultants, evaluation of  patient's response to treatment, examination of patient, obtaining history from patient or surrogate, ordering and review of laboratory studies, pulse oximetry, ordering and review of radiographic studies, ordering and performing treatments and interventions and re-evaluation of patient's condition    I assumed direction of critical care for this patient from another provider in my specialty: no      Care discussed with: accepting provider at another facility                 ED Course           Latest Reference Range & Units 03/02/22 13:37   Troponin T 0.000 - 0.030 ng/mL <0.010   Glucose 65 - 99 mg/dL 249 (H)   Sodium 136 - 145 mmol/L 135 (L)   Potassium 3.5 - 5.2 mmol/L 4.2   CO2 22.0 - 29.0 mmol/L 23.0   Chloride 98 - 107 mmol/L 98   Anion Gap 5.0 - 15.0 mmol/L 14.0   Creatinine 0.57 - 1.00 mg/dL 0.63   BUN 8 - 23 mg/dL 9   BUN/Creatinine Ratio 7.0 - 25.0  14.3   Calcium 8.6 - 10.5 mg/dL 9.8   EGFR >60.0 mL/min/1.73 100.4 [1]   PTT 22.0 - 39.0 seconds 30.7   WBC 3.40 - 10.80 10*3/mm3 6.22   RBC 3.77 - 5.28 10*6/mm3 4.77   Hemoglobin 12.0 - 15.9 g/dL 14.9   Hematocrit 34.0 - 46.6 % 43.9   RDW 12.3 - 15.4 % 12.0 (L)   MCV 79.0 - 97.0 fL 92.0   MCH 26.6 - 33.0 pg 31.2   MCHC 31.5 - 35.7 g/dL 33.9   MPV 6.0 - 12.0 fL 10.1   Platelets 140 - 450 10*3/mm3 255   RDW-SD 37.0 - 54.0 fl 40.3   Neutrophil Rel % 42.7 - 76.0 % 62.0   Lymphocyte Rel % 19.6 - 45.3 % 28.6   Monocyte Rel % 5.0 - 12.0 % 6.8   Eosinophil Rel % 0.3 - 6.2 % 1.0   Basophil Rel % 0.0 - 1.5 % 1.1   Immature Granulocyte Rel % 0.0 - 0.5 % 0.5   Neutrophils Absolute 1.70 - 7.00 10*3/mm3 3.86   Lymphocytes Absolute 0.70 - 3.10 10*3/mm3 1.78   Monocytes Absolute 0.10 - 0.90 10*3/mm3 0.42   Eosinophils Absolute 0.00 - 0.40 10*3/mm3 0.06   Basophils Absolute 0.00 - 0.20 10*3/mm3 0.07   Immature Grans, Absolute 0.00 - 0.05 10*3/mm3 0.03   NRBC 0.0 - 0.2 /100 WBC 0.0   (H): Data is abnormally high  (L): Data is abnormally low  [1] National Kidney Foundation and  American Society of Nephrology (ASN) Task Force recommended calculation based on the Chronic Kidney Disease Epidemiology Collaboration (CKD-EPI) equation refit without adjustment for race.    CT Head Without Contrast    Result Date: 3/2/2022  EXAMINATION: CT HEAD WO CONTRAST-  INDICATION: Stroke, follow up  TECHNIQUE: 5 mm unenhanced images through the brain; dual-energy technique was used to evaluate for residual iodine versus hemorrhage.  The radiation dose reduction device was turned on for each scan per the ALARA (As Low as Reasonably Achievable) protocol.  COMPARISON: Head CT scan of 1:44 PM same date  FINDINGS: Current image is timed 4:56 PM. Prior study report indicated old left parietal infarct, and low-attenuation area consistent with age indeterminate left parietal infarct. No hemorrhage.  The current study shows stable appearance of the low-attenuation left parietal lobe, suspicious for recent/subacute infarction. There is a trace amount of contrast staining along the superomedial margin of the area of infarction. There is no evidence of intracranial hemorrhage, new infarct/edema, mass or mass effect, hydrocephalus, or abnormal extra-axial collection. Calvarium appears intact. Included paranasal sinuses and mastoids appear clear.        Generally stable appearance of the patient's likely recent/subacute left parietal infarct with trace amount of adjacent iodine contrast staining. No significant new intracranial disease compared to 1:44 PM exam.   This report was finalized on 3/2/2022 5:28 PM by Dr. Markell Briscoe MD.      CT Angiogram Neck    Result Date: 3/2/2022   DATE OF EXAM: 3/2/2022 1:43 PM  PROCEDURE: CT ANGIOGRAM NECK-, CT ANGIOGRAM HEAD W AI ANALYSIS OF LVO-  INDICATIONS: Stroke, follow up  COMPARISON: No Comparisons Available  TECHNIQUE: CTA of the head and CTA of the neck was performed after the intravenous administration of 115 mL Isovue 370. Reconstructed coronal and sagittal images were also  obtained. In addition, a 3 D volume rendered image was obtained after post processing. Automated exposure control and iterative reconstruction methods were used. AI analysis of LVO was utilized for the CTA Head imaging portion of the study.  FINDINGS:  CTA neck: Aortic arch appears within normal limits.  Minimal calcified plaque is seen. The right brachycephalic artery is patent without focal stenosis. Visualized portions of the right subclavian artery are also patent without focal stenosis. The right common carotid artery is patent without focal stenosis.  No significant plaque identified. There is no significant plaque at the origin of the right internal carotid artery.  No significant stenosis identified.  The remaining portions of the right internal and external carotid arteries are patent without focal stenosis.  The left common carotid artery is patent without focal stenosis.  Left common carotid arises from common trunk with the brachiocephalic artery.  No significant plaque identified. There is calcified plaque of proximal left internal carotid artery.  No significant stenosis.  The remaining portions of the left interna and external carotid arteries appear patent without focal stenosis.  The visualized portions of the left clavian artery are patent without focal stenosis.  The vertebral arteries are patent bilaterally.  No focal stenosis identified.   Soft tissues of the neck appear within normal limits.  No cervical lymphadenopathy.   CTA head: Intracranial portions of the internal carotid arteries are patent without focal stenosis. The left anterior cerebral artery is patent without focal stenosis. There is complete occlusion of the inferior division of the left M2 segment of the middle cerebral artery.  M1 segment of the left middle cerebral artery appears patent without focal stenosis.  The right anterior cerebral and middle cerebral arteries appear patent without focal stenosis.  There is fetal-type  origin of the right posterior cerebral artery seen as anatomic variant.  There is hypoplasia of the P1 segment of the left posterior cerebral artery with a prominent left posterior communicating artery.  Posterior cerebral arteries are otherwise patent without focal stenosis. The right vertebral artery appears to terminate in a PICA seen as anatomic variant.  Left vertebral artery is patent to the basilar. Basilar artery is patent without focal stenosis.   No pathologic intracranial contrast enhancement identified.            1.  Occlusion of the inferior division of the M2 segment of the left middle cerebral artery. 2.  No other intracranial mass or stenosis or occlusion. 3.  No evidence of carotid or vertebral artery stenosis. Findings personally called to [place] of the stroke team at 2:16 PM on 3/2/2022  This report was finalized on 3/2/2022 2:25 PM by Vignesh Scott MD.          CT Head Without Contrast Stroke Protocol    Result Date: 3/2/2022  CT HEAD WO CONTRAST STROKE PROTOCOL-  Date of Exam: 3/2/2022 1:42 PM  Indication: Neuro deficit, acute, stroke suspected.  Comparison Exams: None available.  Technique: Multiple axial images were obtained from the skull base to the vertex without the administration of IV contrast. The axial data was used to generate reformatted images in the coronal and sagittal planes. Automated exposure control and iterative reconstruction methods were used.  FINDINGS: There is no evidence of acute intracranial hemorrhage.  There are patchy areas of low-attenuation involving the left parietal lobe which are age-indeterminate.  There appears to be a smaller area of encephalomalacia within the posterior left parietal lobe suggesting old infarct.  No abnormal extra-axial fluid collections are seen.   There is no mass effect or hydrocephalus.  There is no evidence of skull fracture.   Visualized paranasal sinuses and mastoid air cells are clear.  The globes and orbits are within normal  limits.        1.  No evidence of intracranial hemorrhage. 2.  Patchy areas of low-attenuation within the left parietal lobe cortex compatible with age-indeterminate infarct. 3.  Small area of encephalomalacia involving the posterior left parietal lobe compatible with old infarct.  4.  Findings personally communicated to the stroke navigator at 1:47 PM on 3/2/2022   This report was finalized on 3/2/2022 1:56 PM by Vignesh Scott MD.      XR Abdomen KUB    Result Date: 3/2/2022  EXAMINATION: XR ABDOMEN KUB-  INDICATION: keofeed  COMPARISON: NONE  FINDINGS: Feeding tube appears redundantly coiled in the stomach, tip back at the level of the GE junction. Bowel gas pattern is nonobstructive.       Feeding tube redundantly coiled in the stomach.    This report was finalized on 3/2/2022 10:24 PM by Dr. Markell Briscoe MD.      CT Angiogram Head w AI Analysis of LVO    Result Date: 3/2/2022   DATE OF EXAM: 3/2/2022 1:43 PM  PROCEDURE: CT ANGIOGRAM NECK-, CT ANGIOGRAM HEAD W AI ANALYSIS OF LVO-  INDICATIONS: Stroke, follow up  COMPARISON: No Comparisons Available  TECHNIQUE: CTA of the head and CTA of the neck was performed after the intravenous administration of 115 mL Isovue 370. Reconstructed coronal and sagittal images were also obtained. In addition, a 3 D volume rendered image was obtained after post processing. Automated exposure control and iterative reconstruction methods were used. AI analysis of LVO was utilized for the CTA Head imaging portion of the study.  FINDINGS:  CTA neck: Aortic arch appears within normal limits.  Minimal calcified plaque is seen. The right brachycephalic artery is patent without focal stenosis. Visualized portions of the right subclavian artery are also patent without focal stenosis. The right common carotid artery is patent without focal stenosis.  No significant plaque identified. There is no significant plaque at the origin of the right internal carotid artery.  No significant stenosis  identified.  The remaining portions of the right internal and external carotid arteries are patent without focal stenosis.  The left common carotid artery is patent without focal stenosis.  Left common carotid arises from common trunk with the brachiocephalic artery.  No significant plaque identified. There is calcified plaque of proximal left internal carotid artery.  No significant stenosis.  The remaining portions of the left interna and external carotid arteries appear patent without focal stenosis.  The visualized portions of the left clavian artery are patent without focal stenosis.  The vertebral arteries are patent bilaterally.  No focal stenosis identified.   Soft tissues of the neck appear within normal limits.  No cervical lymphadenopathy.   CTA head: Intracranial portions of the internal carotid arteries are patent without focal stenosis. The left anterior cerebral artery is patent without focal stenosis. There is complete occlusion of the inferior division of the left M2 segment of the middle cerebral artery.  M1 segment of the left middle cerebral artery appears patent without focal stenosis.  The right anterior cerebral and middle cerebral arteries appear patent without focal stenosis.  There is fetal-type origin of the right posterior cerebral artery seen as anatomic variant.  There is hypoplasia of the P1 segment of the left posterior cerebral artery with a prominent left posterior communicating artery.  Posterior cerebral arteries are otherwise patent without focal stenosis. The right vertebral artery appears to terminate in a PICA seen as anatomic variant.  Left vertebral artery is patent to the basilar. Basilar artery is patent without focal stenosis.   No pathologic intracranial contrast enhancement identified.            1.  Occlusion of the inferior division of the M2 segment of the left middle cerebral artery. 2.  No other intracranial mass or stenosis or occlusion. 3.  No evidence of carotid  or vertebral artery stenosis. Findings personally called to [place] of the stroke team at 2:16 PM on 3/2/2022  This report was finalized on 3/2/2022 2:25 PM by Vignesh Scott MD.      CT CEREBRAL PERFUSION WITH & WITHOUT CONTRAST    Result Date: 3/2/2022  CT CEREBRAL PERFUSION W WO CONTRAST-  Date of Exam: 3/2/2022 1:43 PM  Indication: Neuro deficit, acute, stroke suspected.  Comparison Exams: None available.   TECHNIQUE:  Axial images through the head without contrast. CT perfusion images through the brain. 100 mL Isovue 370 given IV. Low-dose CT acquisition technique included one of the following options: 1. Automated exposure control, 2. Adjustment of mA and/or KV according to patient's size and/or 3. Use of iterative reconstruction.    CT PERFUSION BRAIN:  There is increased mean transit time and time to peak involving the posterior left temporal lobe and portions of the left parietal lobe.  CBF<30% volume: 0 mL Tmax>6sec volume: 19 mL Mismatch volume: 19 mL Mismatch ratio: Infant           1.  Abnormal increased mean transit time and time to peak involving the left temporal and left parietal lobes compatible with ischemia.  No blood flow less than 30% to suggest core infarct at this time. 2.  Findings communicated to [place] of this stroke team at 2:10 PM on 3/2/2022  This report was finalized on 3/2/2022 2:14 PM by Vignesh Scott MD.                                                                Patient was not a candidate for thrombolytics due to onset time greater than 4.5 hours.  She was emergently taken to CT for CTA and perfusion scan.  Ultimately what she was still a candidate for intervention.  She is examined by the stroke team here in the emergency department and taken to the Cath Lab for further evaluation and management.  Please see their note for further details.  MDM    Final diagnoses:   Acute ischemic stroke (HCC)   Expressive aphasia   Right-sided sensory deficit present   Facial weakness,  post-stroke     Patient admitted for further evaluation and management.    [facility]  [address]  [place] [address]  [phone]  Follow up in 4 week(s)  Follow up after event monitor is complete          Medication List      No changes were made to your prescriptions during this visit.          Cruz Zepeda DO  03/07/22 0820

## 2022-03-07 NOTE — PROGRESS NOTES
[facility] Medicine Services  PROGRESS NOTE    Patient Name: Maryana Luevano  : 1960  MRN: [id number]    Date of Admission: 3/2/2022  Primary Care Physician: Son Choi DO    Subjective   Subjective     CC: Follow-up CVA    HPI: No acute events overnight, patient is awake alert, doing much better, able to express herself, though still with some aphasia    ROS:  Gen- No fevers, chills  CV- No chest pain, palpitations  Resp- No cough, dyspnea  GI- No N/V/D, abd pain       Objective   Objective     Vital Signs:   Temp:  [97.7 °F (36.5 °C)-98.4 °F (36.9 °C)] 97.7 °F (36.5 °C)  Heart Rate:  [71-85] 71  Resp:  [16] 16  BP: (128-145)/(69-81) 145/81     Physical Exam:  Constitutional: No acute distress, awake, alert  HENT: NCAT, mucous membranes moist  Respiratory: Clear to auscultation bilaterally, respiratory effort normal   Cardiovascular: RRR, no murmurs, rubs, or gallops  Gastrointestinal: Positive bowel sounds, soft, nontender, nondistended  Musculoskeletal: No bilateral ankle edema  Psychiatric: Appropriate affect, cooperative  Neurologic: Mild expressive aphasia, improving  Skin: No rashes    Results Reviewed:  LAB RESULTS:      Lab 22  0931 22  0935 22  0712 22  0019 22  1855 22  1338 22  1337   WBC 7.75  --  6.95  --  9.00  --  6.22   HEMOGLOBIN 13.7  --  12.9  --  14.0  --  14.9   HEMATOCRIT 40.7  --  38.0  --  41.1  --  43.9   PLATELETS 243  --  243  --  262  --  255   NEUTROS ABS 5.24  --   --   --  6.77  --  3.86   IMMATURE GRANS (ABS) 0.03  --   --   --  0.03  --  0.03   LYMPHS ABS 1.76  --   --   --  1.70  --  1.78   MONOS ABS 0.57  --   --   --  0.41  --  0.42   EOS ABS 0.11  --   --   --  0.05  --  0.06   MCV 91.7  --  92.9  --  90.9  --  92.0   PROTIME  --   --   --  12.5  --  11.4*  --    APTT  --   --   --  22.6*  --   --  30.7   HEPARIN ANTI-XA  --  0.73*  --  0.10*  --   --   --          Lab 22  0931 22  1557  03/03/22  0712 03/02/22  1339 03/02/22  1337   SODIUM 133*  --  130*  --  135*   POTASSIUM 4.4 4.1 3.5  --  4.2   CHLORIDE 100  --  95*  --  98   CO2 21.0*  --  23.0  --  23.0   ANION GAP 12.0  --  12.0  --  14.0   BUN 9  --  8  --  9   CREATININE 0.56*  --  0.51* 0.50* 0.63   EGFR 103.3  --  105.7  --  100.4   GLUCOSE 185*  --  214*  --  249*   CALCIUM 9.7  --  9.5  --  9.8   MAGNESIUM 1.9  --  1.8  --   --    PHOSPHORUS  --   --  3.1  --   --    HEMOGLOBIN A1C  --   --  11.00*  --   --          Lab 03/04/22  0931   TOTAL PROTEIN 6.9   ALBUMIN 4.10   ALT (SGPT) 10   AST (SGOT) 15   BILIRUBIN 0.4   BILIRUBIN DIRECT <0.2   ALK PHOS 113         Lab 03/03/22  0019 03/02/22  1338 03/02/22  1337   TROPONIN T  --   --  <0.010   PROTIME 12.5 11.4*  --    INR 0.96 0.9  --          Lab 03/03/22  0712   CHOLESTEROL 268*   LDL CHOL 184*   HDL CHOL 49   TRIGLYCERIDES 187*             Brief Urine Lab Results     None          Microbiology Results Abnormal     Procedure Component Value - Date/Time    COVID PRE-OP / PRE-PROCEDURE SCREENING ORDER (NO ISOLATION) - Swab, Nasopharynx [[id number]]  (Normal) Collected: 03/02/22 1434    Lab Status: Final result Specimen: Swab from Nasopharynx Updated: 03/02/22 1456    Narrative:      The following orders were created for panel order COVID PRE-OP / PRE-PROCEDURE SCREENING ORDER (NO ISOLATION) - Swab, Nasopharynx.  Procedure                               Abnormality         Status                     ---------                               -----------         ------                     COVID-Angela ABBOTT IN-HOUS...[[id number]]  Normal              Final result                 Please view results for these tests on the individual orders.    COVID-19 ABBOTT IN-HOUSE,NASAL Swab (NO TRANSPORT MEDIA) 2 HR TAT - Swab, Nasopharynx [[id number]]  (Normal) Collected: 03/02/22 1434    Lab Status: Final result Specimen: Swab from Nasopharynx Updated: 03/02/22 1456     COVID19 Presumptive Negative     Narrative:      Fact sheet for providers: https://www.fda.gov/media/824131/download     Fact sheet for patients: https://www.fda.gov/media/293487/download    Test performed by PCR.  If inconsistent with clinical signs and symptoms patient should be tested with different authorized molecular test.          No radiology results from the last 24 hrs    Results for orders placed during the hospital encounter of 03/02/22    Adult Transesophageal Echo (LINDA) W/ Cont if Necessary Per Protocol    Interpretation Summary  · Estimated left ventricular EF = 60% Left ventricular systolic function is normal.  · Saline test results are negative for intracardiac shunting.  · The most likely source based on the study is the aortic atheroma in her aortic arch.    Anesthesia: Cath lab/Echo lab moderate sedation    I was present with the patient for the duration of moderate sedation and supervised staff who had no other duties and monitored the patient for the entire procedure.    Name of independent trained observer: Carine Guerrero RN  Intra-service start time: 1005  Intra-service end time: 1032      I have reviewed the medications:  Scheduled Meds:aspirin, 81 mg, Oral, Daily  atorvastatin, 80 mg, Oral, Nightly  clopidogrel, 75 mg, Oral, Daily  insulin detemir, 10 Units, Subcutaneous, Q12H  insulin lispro, 0-14 Units, Subcutaneous, 4x Daily With Meals & Nightly  OXcarbazepine, 300 mg, Oral, BID  sodium chloride, 10 mL, Intravenous, Q12H      Continuous Infusions:niCARdipine, 5-15 mg/hr, Last Rate: 2.5 mg/hr (03/04/22 0125)      PRN Meds:.•  acetaminophen  •  potassium chloride  •  sodium chloride  •  sodium chloride    Assessment/Plan   Assessment & Plan     Active Hospital Problems    Diagnosis  POA   • **AIS [I63.9]  Yes   • Hypertension [I10]  Yes   • Dyslipidemia [E78.5]  Yes   • T2DM  [E11.9]  Yes   • Seizure disorder (on Trileptal) [G40.909]  Yes   • Depression [F32.A]  Yes      Resolved Hospital Problems   No resolved problems  to display.        Brief Hospital Course to date:  Maryana Luevano is a 61 y.o. female history of type 2 diabetes, seizure disorder, depression, hypertension, hyperlipidemia patient presented 3/2/2022 with acute CVA s/p mechanical thrombectomy, requiring ICU care post procedure.  Progressed well and was transferred to telemetry for ongoing care management.     Acute ischemic stroke s/p mechanical thrombectomy  -Neurology following, aphasia is improving, continue statin DAPT x30 days, will need 30-day event monitor at discharge  -LINDA with no evidence of PFO/ASD, no source of embolus found  -PT/OT following, anticipate discharge to rehab, she will also need speech therapy at discharge  -Follow-up in neurology clinic 6 weeks post discharge     Hypertension  -BP currently stable, not on any antihypertensives at home, continue to closely monitor     Seizure disorder-continue Trileptal     Type 2 diabetes  -FSBG is reviewed.  Continue basal, SSI     Depression-resume home Cymbalta    DVT prophylaxis:  Mechanical DVT prophylaxis orders are present.     AM-PAC 6 Clicks Score (PT): 17 (03/06/22 1614)    Disposition: TBD, anticipate discharge to rehab once arranged, CM following    CODE STATUS:   Code Status and Medical Interventions:   Ordered at: 03/02/22 8971     Code Status (Patient has no pulse and is not breathing):    CPR (Attempt to Resuscitate)     Medical Interventions (Patient has pulse or is breathing):    Full Support       Geena Blanton MD  03/07/22

## 2022-03-07 NOTE — PLAN OF CARE
Goal Outcome Evaluation:  Plan of Care Reviewed With: patient        Progress: improving  Outcome Evaluation: Pt with good progress completing 20ft functional mob CGA at FWW, SBA sink side grooming/bathing tasks tolerated at FWW with good balance, setup/SUP toileting at elevated toilet, setup LBD socks, cont IPOT per POC

## 2022-03-08 VITALS
HEIGHT: 67 IN | RESPIRATION RATE: 18 BRPM | DIASTOLIC BLOOD PRESSURE: 81 MMHG | TEMPERATURE: 97.7 F | OXYGEN SATURATION: 96 % | WEIGHT: 170 LBS | HEART RATE: 90 BPM | BODY MASS INDEX: 26.68 KG/M2 | SYSTOLIC BLOOD PRESSURE: 146 MMHG

## 2022-03-08 DIAGNOSIS — R00.2 PALPITATIONS: Primary | ICD-10-CM

## 2022-03-08 LAB — GLUCOSE BLDC GLUCOMTR-MCNC: 157 MG/DL (ref 70–130)

## 2022-03-08 PROCEDURE — 99238 HOSP IP/OBS DSCHRG MGMT 30/<: CPT | Performed by: INTERNAL MEDICINE

## 2022-03-08 PROCEDURE — 97116 GAIT TRAINING THERAPY: CPT

## 2022-03-08 PROCEDURE — 82962 GLUCOSE BLOOD TEST: CPT

## 2022-03-08 PROCEDURE — 63710000001 INSULIN LISPRO (HUMAN) PER 5 UNITS: Performed by: INTERNAL MEDICINE

## 2022-03-08 PROCEDURE — 63710000001 INSULIN DETEMIR PER 5 UNITS: Performed by: INTERNAL MEDICINE

## 2022-03-08 RX ORDER — CLOPIDOGREL BISULFATE 75 MG/1
75 TABLET ORAL DAILY
Qty: 30 TABLET | Refills: 0
Start: 2022-03-08 | End: 2022-04-07

## 2022-03-08 RX ORDER — ASPIRIN 81 MG/1
81 TABLET, CHEWABLE ORAL DAILY
Start: 2022-03-08

## 2022-03-08 RX ORDER — ATORVASTATIN CALCIUM 80 MG/1
80 TABLET, FILM COATED ORAL NIGHTLY
Start: 2022-03-08

## 2022-03-08 RX ADMIN — ASPIRIN 81 MG 81 MG: 81 TABLET ORAL at 08:40

## 2022-03-08 RX ADMIN — OXCARBAZEPINE 300 MG: 300 TABLET, FILM COATED ORAL at 08:40

## 2022-03-08 RX ADMIN — INSULIN LISPRO 3 UNITS: 100 INJECTION, SOLUTION INTRAVENOUS; SUBCUTANEOUS at 08:39

## 2022-03-08 RX ADMIN — CLOPIDOGREL BISULFATE 75 MG: 75 TABLET ORAL at 08:40

## 2022-03-08 RX ADMIN — INSULIN DETEMIR 10 UNITS: 100 INJECTION, SOLUTION SUBCUTANEOUS at 08:39

## 2022-03-08 NOTE — DISCHARGE SUMMARY
[facility] Medicine Services  DISCHARGE SUMMARY    Patient Name: Maryana Luevano  : 1960  MRN: [id number]    Date of Admission: 3/2/2022  1:01 PM  Date of Discharge: 3/8/2022  Primary Care Physician: Son Choi,     Consults     Date and Time Order Name Status Description    3/3/2022  4:30 PM Inpatient Cardiology Consult Completed     3/2/2022  4:31 PM Inpatient Neurology Consult Stroke Completed     3/2/2022  1:34 PM Inpatient Neurology Consult Stroke Completed           Hospital Course     Presenting Problem:   Acute CVA (cerebrovascular accident) (HCC) [I63.9]    Active Hospital Problems    Diagnosis  POA   • **AIS [I63.9]  Yes   • Hypertension [I10]  Yes   • Dyslipidemia [E78.5]  Yes   • T2DM  [E11.9]  Yes   • Seizure disorder (on Trileptal) [G40.909]  Yes   • Depression [F32.A]  Yes      Resolved Hospital Problems   No resolved problems to display.      Hospital Course:  Maryana Luevano is a 61 y.o. female history of type 2 diabetes, seizure disorder, depression, hypertension, hyperlipidemia patient presented 3/2/2022 with acute CVA s/p mechanical thrombectomy, requiring ICU care post procedure.  She progressed well and was transferred to telemetry as she awaited discharge to rehab.      Acute ischemic stroke s/p mechanical thrombectomy  -Neurology was consulted and are following, aphasia is improving, continue statin DAPT x30 days, followed by aspirin monotherapy, she will be discharged with a 30-day event monitor at discharge  -LINDA  showed no evidence of PFO/ASD, no source of embolus found  -PT/OT was consulted and did recommend rehab, she will also need ongoing speech therapy at discharge.  -Follow-up in neurology clinic 4 weeks post discharge     Hypertension  -BP is currently stable, she is not on any antihypertensives at home     Seizure disorder-continue Trileptal     Type 2 diabetes  -FSBG is reviewed and are well controlled, okay to resume home Glucophage  discharge.     Depression-resume home Cymbalta     Discharge Follow Up Recommendations for outpatient labs/diagnostics:  F/u with neurology in clinic in 4 weeks    Day of Discharge     HPI: No acute events overnight, particular she rested well, speech continues to improve     Review of Systems  Gen- No fevers, chills  CV- No chest pain, palpitations  Resp- No cough, dyspnea  GI- No N/V/D, abd pain    Vital Signs:   Temp:  [96.3 °F (35.7 °C)-98.1 °F (36.7 °C)] 97.8 °F (36.6 °C)  Heart Rate:  [] 68  Resp:  [18] 18  BP: (126-142)/(61-81) 142/81      Physical Exam:  Constitutional: No acute distress, awake, alert  HENT: NCAT, mucous membranes moist  Respiratory: Clear to auscultation bilaterally, respiratory effort normal   Cardiovascular: RRR, no murmurs, rubs, or gallops  Gastrointestinal: Positive bowel sounds, soft, nontender, nondistended  Musculoskeletal: No bilateral ankle edema  Psychiatric: Appropriate affect, cooperative  Neurologic: Oriented x 3, expressive aphasia, but improving  Skin: No rashes    Pertinent  and/or Most Recent Results     LAB RESULTS:      Lab 03/04/22  0931 03/03/22  0935 03/03/22  0712 03/03/22  0019 03/02/22  1855 03/02/22  1338 03/02/22  1337   WBC 7.75  --  6.95  --  9.00  --  6.22   HEMOGLOBIN 13.7  --  12.9  --  14.0  --  14.9   HEMATOCRIT 40.7  --  38.0  --  41.1  --  43.9   PLATELETS 243  --  243  --  262  --  255   NEUTROS ABS 5.24  --   --   --  6.77  --  3.86   IMMATURE GRANS (ABS) 0.03  --   --   --  0.03  --  0.03   LYMPHS ABS 1.76  --   --   --  1.70  --  1.78   MONOS ABS 0.57  --   --   --  0.41  --  0.42   EOS ABS 0.11  --   --   --  0.05  --  0.06   MCV 91.7  --  92.9  --  90.9  --  92.0   PROTIME  --   --   --  12.5  --  11.4*  --    APTT  --   --   --  22.6*  --   --  30.7   HEPARIN ANTI-XA  --  0.73*  --  0.10*  --   --   --          Lab 03/04/22  0931 03/03/22  1557 03/03/22  0712 03/02/22  1339 03/02/22  1337   SODIUM 133*  --  130*  --  135*   POTASSIUM 4.4 4.1  3.5  --  4.2   CHLORIDE 100  --  95*  --  98   CO2 21.0*  --  23.0  --  23.0   ANION GAP 12.0  --  12.0  --  14.0   BUN 9  --  8  --  9   CREATININE 0.56*  --  0.51* 0.50* 0.63   EGFR 103.3  --  105.7  --  100.4   GLUCOSE 185*  --  214*  --  249*   CALCIUM 9.7  --  9.5  --  9.8   MAGNESIUM 1.9  --  1.8  --   --    PHOSPHORUS  --   --  3.1  --   --    HEMOGLOBIN A1C  --   --  11.00*  --   --          Lab 03/04/22  0931   TOTAL PROTEIN 6.9   ALBUMIN 4.10   ALT (SGPT) 10   AST (SGOT) 15   BILIRUBIN 0.4   BILIRUBIN DIRECT <0.2   ALK PHOS 113         Lab 03/03/22  0019 03/02/22  1338 03/02/22  1337   TROPONIN T  --   --  <0.010   PROTIME 12.5 11.4*  --    INR 0.96 0.9  --          Lab 03/03/22  0712   CHOLESTEROL 268*   LDL CHOL 184*   HDL CHOL 49   TRIGLYCERIDES 187*             Brief Urine Lab Results     None        Microbiology Results (last 10 days)     Procedure Component Value - Date/Time    COVID PRE-OP / PRE-PROCEDURE SCREENING ORDER (NO ISOLATION) - Swab, Nasopharynx [[id number]]  (Normal) Collected: 03/02/22 1434    Lab Status: Final result Specimen: Swab from Nasopharynx Updated: 03/02/22 1456    Narrative:      The following orders were created for panel order COVID PRE-OP / PRE-PROCEDURE SCREENING ORDER (NO ISOLATION) - Swab, Nasopharynx.  Procedure                               Abnormality         Status                     ---------                               -----------         ------                     COVID-19, ABBOTT IN-HOUS...[[id number]]  Normal              Final result                 Please view results for these tests on the individual orders.    COVID-19, ABBOTT IN-HOUSE,NASAL Swab (NO TRANSPORT MEDIA) 2 HR TAT - Swab, Nasopharynx [[id number]]  (Normal) Collected: 03/02/22 1434    Lab Status: Final result Specimen: Swab from Nasopharynx Updated: 03/02/22 1456     COVID19 Presumptive Negative    Narrative:      Fact sheet for providers: https://www.fda.gov/media/579911/download     Fact sheet  for patients: https://www.fda.gov/media/413784/download    Test performed by PCR.  If inconsistent with clinical signs and symptoms patient should be tested with different authorized molecular test.          Adult Transesophageal Echo (LINDA) W/ Cont if Necessary Per Protocol    Result Date: 3/4/2022  · Estimated left ventricular EF = 60% Left ventricular systolic function is normal. · Saline test results are negative for intracardiac shunting. · The most likely source based on the study is the aortic atheroma in her aortic arch.  Anesthesia: Cath lab/Echo lab moderate sedation I was present with the patient for the duration of moderate sedation and supervised staff who had no other duties and monitored the patient for the entire procedure. Name of independent trained observer: Carine Guerrero RN Intra-service start time: 1005 Intra-service end time: 1032    Adult Transthoracic Echo Complete W/ Cont if Necessary Per Protocol (With Agitated Saline)    Result Date: 3/3/2022  · Normal left ventricular systolic function, estimated EF 60%. · Left ventricular diastolic function is consistent with (grade I) impaired relaxation. · Trace mitral regurgitation, trace tricuspid regurgitation. · Saline test results are negative.      CT Head Without Contrast    Result Date: 3/2/2022  EXAMINATION: CT HEAD WO CONTRAST-  INDICATION: Stroke, follow up  TECHNIQUE: 5 mm unenhanced images through the brain; dual-energy technique was used to evaluate for residual iodine versus hemorrhage.  The radiation dose reduction device was turned on for each scan per the ALARA (As Low as Reasonably Achievable) protocol.  COMPARISON: Head CT scan of 1:44 PM same date  FINDINGS: Current image is timed 4:56 PM. Prior study report indicated old left parietal infarct, and low-attenuation area consistent with age indeterminate left parietal infarct. No hemorrhage.  The current study shows stable appearance of the low-attenuation left parietal lobe,  suspicious for recent/subacute infarction. There is a trace amount of contrast staining along the superomedial margin of the area of infarction. There is no evidence of intracranial hemorrhage, new infarct/edema, mass or mass effect, hydrocephalus, or abnormal extra-axial collection. Calvarium appears intact. Included paranasal sinuses and mastoids appear clear.        Generally stable appearance of the patient's likely recent/subacute left parietal infarct with trace amount of adjacent iodine contrast staining. No significant new intracranial disease compared to 1:44 PM exam.   This report was finalized on 3/2/2022 5:28 PM by Dr. Markell Briscoe MD.      CT Angiogram Neck    Result Date: 3/2/2022   DATE OF EXAM: 3/2/2022 1:43 PM  PROCEDURE: CT ANGIOGRAM NECK-, CT ANGIOGRAM HEAD W AI ANALYSIS OF LVO-  INDICATIONS: Stroke, follow up  COMPARISON: No Comparisons Available  TECHNIQUE: CTA of the head and CTA of the neck was performed after the intravenous administration of 115 mL Isovue 370. Reconstructed coronal and sagittal images were also obtained. In addition, a 3 D volume rendered image was obtained after post processing. Automated exposure control and iterative reconstruction methods were used. AI analysis of LVO was utilized for the CTA Head imaging portion of the study.  FINDINGS:  CTA neck: Aortic arch appears within normal limits.  Minimal calcified plaque is seen. The right brachycephalic artery is patent without focal stenosis. Visualized portions of the right subclavian artery are also patent without focal stenosis. The right common carotid artery is patent without focal stenosis.  No significant plaque identified. There is no significant plaque at the origin of the right internal carotid artery.  No significant stenosis identified.  The remaining portions of the right internal and external carotid arteries are patent without focal stenosis.  The left common carotid artery is patent without focal stenosis.   Left common carotid arises from common trunk with the brachiocephalic artery.  No significant plaque identified. There is calcified plaque of proximal left internal carotid artery.  No significant stenosis.  The remaining portions of the left interna and external carotid arteries appear patent without focal stenosis.  The visualized portions of the left clavian artery are patent without focal stenosis.  The vertebral arteries are patent bilaterally.  No focal stenosis identified.   Soft tissues of the neck appear within normal limits.  No cervical lymphadenopathy.   CTA head: Intracranial portions of the internal carotid arteries are patent without focal stenosis. The left anterior cerebral artery is patent without focal stenosis. There is complete occlusion of the inferior division of the left M2 segment of the middle cerebral artery.  M1 segment of the left middle cerebral artery appears patent without focal stenosis.  The right anterior cerebral and middle cerebral arteries appear patent without focal stenosis.  There is fetal-type origin of the right posterior cerebral artery seen as anatomic variant.  There is hypoplasia of the P1 segment of the left posterior cerebral artery with a prominent left posterior communicating artery.  Posterior cerebral arteries are otherwise patent without focal stenosis. The right vertebral artery appears to terminate in a PICA seen as anatomic variant.  Left vertebral artery is patent to the basilar. Basilar artery is patent without focal stenosis.   No pathologic intracranial contrast enhancement identified.            1.  Occlusion of the inferior division of the M2 segment of the left middle cerebral artery. 2.  No other intracranial mass or stenosis or occlusion. 3.  No evidence of carotid or vertebral artery stenosis. Findings personally called to [place] of the stroke team at 2:16 PM on 3/2/2022  This report was finalized on 3/2/2022 2:25 PM by Vignesh Scott MD.      MRI  Brain Without Contrast    Result Date: 3/3/2022   DATE OF EXAM: 3/2/2022 10:41 PM  PROCEDURE: MRI BRAIN WO CONTRAST-  INDICATIONS: Stroke, follow up  COMPARISON: Head CT scan of same date.  TECHNIQUE: Multiplanar multisequence images of the brain were performed without contrast according to routine brain MRI protocol.  FINDINGS: There are extensive, partially confluent areas of restricted diffusion in the posterior left frontal lobe, parietal lobe, and superior left temporal lobe consistent with patient's known infarct. These clustered but mostly noncontiguous areas extend over a roughly 10 x 3 cm length. The larger more confluent posterior portion of the infarct is clearly visible on CT. The more anterior areas of infarction are rather subtle on CT scan. No infarction is seen elsewhere. No signal changes suggestive of underlying blood products are identified and there is no evidence of hemorrhage elsewhere. There is no evidence of mass or significant mass effect, hydrocephalus, or abnormal extra-axial collection. Sagittal images show the midline structures to appear grossly normal. There is generally expected flow signal in the major intracranial arteries except for focal signal dropout in the left M2 branch noted as occluded or severely stenotic on earlier head CTA. No gross abnormalities are seen in the orbits or paranasal sinuses.      Extensive left MCA territory infarct as noted and generally corresponding to patient's CT images. No evidence of involvement of other infarct territory. No evidence of hemorrhage.  This report was finalized on 3/3/2022 8:56 AM by Dr. Markell Briscoe MD.      SLP FEES - Fiberoptic Endo Eval Swallow    Result Date: 3/3/2022  This procedure was auto-finalized with no dictation required.    XR Chest 1 View    Result Date: 3/3/2022   DATE OF EXAM: 3/3/2022 5:49 AM  PROCEDURE: XR CHEST 1 VW-  INDICATIONS: S/P AIS  COMPARISON: No Comparisons Available  TECHNIQUE: Portable chest radiograph.   FINDINGS:  Esophagogastric tube below the diaphragm. Heart size normal. No focal consolidation, pneumothorax, or significant pleural effusion. Osseous structures appear intact.      1. Esophagogastric tube below the diaphragm. 2. Clear lungs.  This report was finalized on 3/3/2022 8:10 AM by Rudi Ochoa MD.      CT Head Without Contrast Stroke Protocol    Result Date: 3/2/2022  CT HEAD WO CONTRAST STROKE PROTOCOL-  Date of Exam: 3/2/2022 1:42 PM  Indication: Neuro deficit, acute, stroke suspected.  Comparison Exams: None available.  Technique: Multiple axial images were obtained from the skull base to the vertex without the administration of IV contrast. The axial data was used to generate reformatted images in the coronal and sagittal planes. Automated exposure control and iterative reconstruction methods were used.  FINDINGS: There is no evidence of acute intracranial hemorrhage.  There are patchy areas of low-attenuation involving the left parietal lobe which are age-indeterminate.  There appears to be a smaller area of encephalomalacia within the posterior left parietal lobe suggesting old infarct.  No abnormal extra-axial fluid collections are seen.   There is no mass effect or hydrocephalus.  There is no evidence of skull fracture.   Visualized paranasal sinuses and mastoid air cells are clear.  The globes and orbits are within normal limits.        1.  No evidence of intracranial hemorrhage. 2.  Patchy areas of low-attenuation within the left parietal lobe cortex compatible with age-indeterminate infarct. 3.  Small area of encephalomalacia involving the posterior left parietal lobe compatible with old infarct.  4.  Findings personally communicated to the stroke navigator at 1:47 PM on 3/2/2022   This report was finalized on 3/2/2022 1:56 PM by Vignesh Scott MD.      XR Abdomen KUB    Result Date: 3/2/2022  EXAMINATION: XR ABDOMEN KUB-  INDICATION: keofeed  COMPARISON: NONE  FINDINGS: Feeding tube appears  redundantly coiled in the stomach, tip back at the level of the GE junction. Bowel gas pattern is nonobstructive.       Feeding tube redundantly coiled in the stomach.    This report was finalized on 3/2/2022 10:24 PM by Dr. Markell Briscoe MD.      CT Angiogram Head w AI Analysis of LVO    Result Date: 3/2/2022   DATE OF EXAM: 3/2/2022 1:43 PM  PROCEDURE: CT ANGIOGRAM NECK-, CT ANGIOGRAM HEAD W AI ANALYSIS OF LVO-  INDICATIONS: Stroke, follow up  COMPARISON: No Comparisons Available  TECHNIQUE: CTA of the head and CTA of the neck was performed after the intravenous administration of 115 mL Isovue 370. Reconstructed coronal and sagittal images were also obtained. In addition, a 3 D volume rendered image was obtained after post processing. Automated exposure control and iterative reconstruction methods were used. AI analysis of LVO was utilized for the CTA Head imaging portion of the study.  FINDINGS:  CTA neck: Aortic arch appears within normal limits.  Minimal calcified plaque is seen. The right brachycephalic artery is patent without focal stenosis. Visualized portions of the right subclavian artery are also patent without focal stenosis. The right common carotid artery is patent without focal stenosis.  No significant plaque identified. There is no significant plaque at the origin of the right internal carotid artery.  No significant stenosis identified.  The remaining portions of the right internal and external carotid arteries are patent without focal stenosis.  The left common carotid artery is patent without focal stenosis.  Left common carotid arises from common trunk with the brachiocephalic artery.  No significant plaque identified. There is calcified plaque of proximal left internal carotid artery.  No significant stenosis.  The remaining portions of the left interna and external carotid arteries appear patent without focal stenosis.  The visualized portions of the left clavian artery are patent without focal  stenosis.  The vertebral arteries are patent bilaterally.  No focal stenosis identified.   Soft tissues of the neck appear within normal limits.  No cervical lymphadenopathy.   CTA head: Intracranial portions of the internal carotid arteries are patent without focal stenosis. The left anterior cerebral artery is patent without focal stenosis. There is complete occlusion of the inferior division of the left M2 segment of the middle cerebral artery.  M1 segment of the left middle cerebral artery appears patent without focal stenosis.  The right anterior cerebral and middle cerebral arteries appear patent without focal stenosis.  There is fetal-type origin of the right posterior cerebral artery seen as anatomic variant.  There is hypoplasia of the P1 segment of the left posterior cerebral artery with a prominent left posterior communicating artery.  Posterior cerebral arteries are otherwise patent without focal stenosis. The right vertebral artery appears to terminate in a PICA seen as anatomic variant.  Left vertebral artery is patent to the basilar. Basilar artery is patent without focal stenosis.   No pathologic intracranial contrast enhancement identified.            1.  Occlusion of the inferior division of the M2 segment of the left middle cerebral artery. 2.  No other intracranial mass or stenosis or occlusion. 3.  No evidence of carotid or vertebral artery stenosis. Findings personally called to [place] of the stroke team at 2:16 PM on 3/2/2022  This report was finalized on 3/2/2022 2:25 PM by Vignesh Scott MD.      CT CEREBRAL PERFUSION WITH & WITHOUT CONTRAST    Result Date: 3/2/2022  CT CEREBRAL PERFUSION W WO CONTRAST-  Date of Exam: 3/2/2022 1:43 PM  Indication: Neuro deficit, acute, stroke suspected.  Comparison Exams: None available.   TECHNIQUE:  Axial images through the head without contrast. CT perfusion images through the brain. 100 mL Isovue 370 given IV. Low-dose CT acquisition technique included  one of the following options: 1. Automated exposure control, 2. Adjustment of mA and/or KV according to patient's size and/or 3. Use of iterative reconstruction.    CT PERFUSION BRAIN:  There is increased mean transit time and time to peak involving the posterior left temporal lobe and portions of the left parietal lobe.  CBF<30% volume: 0 mL Tmax>6sec volume: 19 mL Mismatch volume: 19 mL Mismatch ratio: Infant           1.  Abnormal increased mean transit time and time to peak involving the left temporal and left parietal lobes compatible with ischemia.  No blood flow less than 30% to suggest core infarct at this time. 2.  Findings communicated to [place] of this stroke team at 2:10 PM on 3/2/2022  This report was finalized on 3/2/2022 2:14 PM by Vignesh Scott MD.      Invasive peripheral vascular study    Addendum Date: 3/4/2022    PREOPERATIVE DIAGNOSIS: Left M2 occlusion ? POSTOPERATIVE DIAGNOSIS: Same ? INDICATION: This is a 61-year-old female who presented with worsening speech.  Imaging revealed an occlusion of the dominant left M2 trunk.  She was subsequently taken for thrombectomy. Emergency Consent Obtained PROCEDURES PERFORMED: 1.  1 vessel diagnostic cerebral arteriogram. 2. Transcatheter endovascular thrombectomy for treatment of acute thromboembolic stroke. 3.  Intra-arterial therapy with verapamil for vasospasm 4. Moderate Sedation 5.  Ultrasound guidance for vascular access ? Physician: Dr. Hamilton Nelson MD ? SEDATION: A total of 90 minutes of moderate sedation was provided by me. Physiologic vitals were monitored by an independently trained observer. A total of 25 mg of fentanyl and 0.5 mg of versed were given. Vessels selected: ? 1.  Left internal carotid artery ? PROCEDURE: ? The patient was taken to the bi-plane neuroangiography suite and placed supine on the procedure table. The patient was prepped and draped in the usual sterile fashion. A procedural timeout was performed prior to beginning  the procedure. ? Ultrasound was used to identify the intended artery for access. It was patent, appropriate for catheterization, and used for guidance of the micropuncture. Successful cannulation was achieved and images were saved to PACS for review. With ultrasound assistance and use of the Seldinger technique, the right radial artery was accessed using a 5 Israeli micropuncture radial slender guide sheath kit. Once the sheath was placed, cocktail was slowly injected into the sheath. Please see chart for dosing used. At this time a De La Rosa 1 glide catheter with a Glidewire inside of it was then placed through the sheath. The catheter and glidewire were then advanced through the arterial system, and positioned into the left internal carotid artery.  At this time, we used a Martinez exchange wire to replace the diagnostic catheter with a Terumo radial guide sheath.  Once the guide sheath was in the proximal internal carotid artery angiogram showed severe vasospasm of the cervical portion of the left internal carotid artery.  Because of this vasospasm, I could not advance a wire catheter passed it.  At this time, I slowly injected 10 mg of verapamil through the base catheter over the course of 10 minutes.  Repeat angiogram showed improvement of the vasospasm.  At this time is able to advance otherwise and catheters past the area of vasospasm. ? Under direct fluoroscopic guidance and using digital roadmap technique, a Vecta 71 aspiration catheter was passed over a 0.027 phenom 27 microcatheter which was passed over a 0.014 inch Synchro guidewire through the guide sheath and into the left internal carotid artery and the wire and microcatheter were passed across the site of occlusion. The Vecta 71 was advanced to the level of the site of occlusion. ? The microwire and microcatheter were both removed. The Vecta 74 catheter was then connected to aspiration for approximately 5 minutes. Under aspiration, the Vecta 71 catheter  was slowly removed and the Infinity was vigorously aspirated and flushed with heparinized saline. Follow up angiogram showed there was now patency of the M2 branch.  There was continued to not residual clot within the vessel.  In order to try to remove this, I then reloaded my system back into the base catheter. Under direct fluoroscopic guidance and using digital roadmap technique, a Vecta 71 aspiration catheter was passed over a 0.027 phenom 27 microcatheter which was passed over a 0.014 inch Synchro guidewire through the guide sheath and into the left internal carotid artery and the wire and microcatheter were passed across the site of occlusion.  The microwire was then removed and a 4 x 40 stent retriever Solitaire device was deployed across the occlusion.  Under aspiration this was removed and follow-up angiogram showed improved flow through the M2 vessel.  These findings were consistent with a TICI 3 thrombectomy. ? At this time, all catheters were removed and a TR band was inflated on the wrist prior to sheath removal. ? FINDINGS: Initial angiography of the left internal carotid artery shows normal distal cervical, petrous, cavernous and clinoid segments. The ophthalmic artery fills normally. The posterior communicating artery and anterior choroidal artery are easily visualized and of normal caliber. The anterior cerebral artery fills normally in all segments.  There is an occlusion of the dominant M2 trunk.  Follow-up angiogram after exchanging for the radial guide sheath shows severe vasospasm of the cervical segment of the internal carotid artery.  Follow-up angiogram after intra-arterial therapy with verapamil shows improvement.  Follow-up angiogram after the first lobectomy attempt shows that the dominant M2 trunk is now patent, but there is residual nonocclusive thrombus in the vessel.  Follow-up angiogram after second thrombectomy attempt shows continued patency of the M2 trunk with improved residual  thrombus in the vessel.  These findings are consistent with a TICI 3 thrombectomy. ? Flour Time (min): 534 ? Air Kerma (mGy): 24.2 ? Contrast: 100 cc of 320 mg/ml Visipaque ? IMPRESSION: TICI 3 thrombectomy of an acute left M2 occlusion.  Additionally, successful vasospasm treatment of the cervical segment of the ICA with intra-arterial verapamil.  No complications noted.     Addendum Date: 3/4/2022    PREOPERATIVE DIAGNOSIS: Left M2 occlusion ? POSTOPERATIVE DIAGNOSIS: Same ? INDICATION: This is a 61-year-old female who presented with worsening speech.  Imaging revealed an occlusion of the dominant left M2 trunk.  She was subsequently taken for thrombectomy. Emergency Consent Obtained PROCEDURES PERFORMED: 1.  1 vessel diagnostic cerebral arteriogram. 2. Transcatheter endovascular thrombectomy for treatment of acute thromboembolic stroke. 3.  Intra-arterial therapy with verapamil for vasospasm 4. Moderate Sedation 5.  Ultrasound guidance for vascular access ? Physician: Dr. Hamilton Nelson MD ? SEDATION: A total of 90 minutes of moderate sedation was provided by me. Physiologic vitals were monitored by an independently trained observer. A total of 25 mg of fentanyl and 0.5 mg of versed were given. Vessels selected: ? 1.  Right internal carotid artery ? PROCEDURE: ? The patient was taken to the bi-plane neuroangiography suite and placed supine on the procedure table. The patient was prepped and draped in the usual sterile fashion. A procedural timeout was performed prior to beginning the procedure. ? Ultrasound was used to identify the intended artery for access. It was patent, appropriate for catheterization, and used for guidance of the micropuncture. Successful cannulation was achieved and images were saved to PACS for review. With ultrasound assistance and use of the Seldinger technique, the right radial artery was accessed using a 5 [place] micropuncture radial slender guide sheath kit. Once the sheath was  placed, cocktail was slowly injected into the sheath. Please see chart for dosing used. At this time a De La Rosa 1 glide catheter with a Glidewire inside of it was then placed through the sheath. The catheter and glidewire were then advanced through the arterial system, and positioned into the left internal carotid artery.  At this time, we used a Martinez exchange wire to replace the diagnostic catheter with a Terumo radial guide sheath.  Once the guide sheath was in the proximal internal carotid artery angiogram showed severe vasospasm of the cervical portion of the left internal carotid artery.  Because of this vasospasm, I could not advance a wire catheter passed it.  At this time, I slowly injected 10 mg of verapamil through the base catheter over the course of 10 minutes.  Repeat angiogram showed improvement of the vasospasm.  At this time is able to advance otherwise and catheters past the area of vasospasm. ? Under direct fluoroscopic guidance and using digital roadmap technique, a Vecta 71 aspiration catheter was passed over a 0.027 phenom 27 microcatheter which was passed over a 0.014 inch Synchro guidewire through the guide sheath and into the left internal carotid artery and the wire and microcatheter were passed across the site of occlusion. The Vecta 71 was advanced to the level of the site of occlusion. ? The microwire and microcatheter were both removed. The Vecta 74 catheter was then connected to aspiration for approximately 5 minutes. Under aspiration, the Vecta 71 catheter was slowly removed and the Infinity was vigorously aspirated and flushed with heparinized saline. Follow up angiogram showed there was now patency of the M2 branch.  There was continued to not residual clot within the vessel.  In order to try to remove this, I then reloaded my system back into the base catheter. Under direct fluoroscopic guidance and using digital roadmap technique, a Vecta 71 aspiration catheter was passed over a  0.027 phenom 27 microcatheter which was passed over a 0.014 inch Synchro guidewire through the guide sheath and into the left internal carotid artery and the wire and microcatheter were passed across the site of occlusion.  The microwire was then removed and a 4 x 40 stent retriever Solitaire device was deployed across the occlusion.  Under aspiration this was removed and follow-up angiogram showed improved flow through the M2 vessel.  These findings were consistent with a TICI 3 thrombectomy. ? At this time, all catheters were removed and a TR band was inflated on the wrist prior to sheath removal. ? FINDINGS: Initial angiography of the left internal carotid artery shows normal distal cervical, petrous, cavernous and clinoid segments. The ophthalmic artery fills normally. The posterior communicating artery and anterior choroidal artery are easily visualized and of normal caliber. The anterior cerebral artery fills normally in all segments.  There is an occlusion of the dominant M2 trunk.  Follow-up angiogram after exchanging for the radial guide sheath shows severe vasospasm of the cervical segment of the internal carotid artery.  Follow-up angiogram after intra-arterial therapy with verapamil shows improvement.  Follow-up angiogram after the first lobectomy attempt shows that the dominant M2 trunk is now patent, but there is residual nonocclusive thrombus in the vessel.  Follow-up angiogram after second thrombectomy attempt shows continued patency of the M2 trunk with improved residual thrombus in the vessel.  These findings are consistent with a TICI 3 thrombectomy. ? Flour Time (min): 534 ? Air Kerma (mGy): 24.2 ? Contrast: 100 cc of 320 mg/ml Visipaque ? IMPRESSION: TICI 3 thrombectomy of an acute left M2 occlusion.  Additionally, successful vasospasm treatment of the cervical segment of the ICA with intra-arterial verapamil.  No complications noted.     Result Date: 3/3/2022  PREOPERATIVE DIAGNOSIS: Left M2  occlusion ? POSTOPERATIVE DIAGNOSIS: Same ? INDICATION: This is a 61-year-old female who presented with worsening speech.  Imaging revealed an occlusion of the dominant left M2 trunk.  She was subsequently taken for thrombectomy. Emergency Consent Obtained PROCEDURES PERFORMED: 1.  1 vessel diagnostic cerebral arteriogram. 2. Transcatheter endovascular thrombectomy for treatment of acute thromboembolic stroke. 3.  Intra-arterial therapy with verapamil for vasospasm 4. Moderate Sedation 5.  Ultrasound guidance for vascular access ? Physician: Dr. Hamilton Nelson MD ? SEDATION: A total of 90 minutes of moderate sedation was provided by me. Physiologic vitals were monitored by an independently trained observer. A total of 25 mg of fentanyl and 0.5 mg of versed were given. Vessels selected: ? 1.  Right internal carotid artery ? PROCEDURE: ? The patient was taken to the bi-plane neuroangiography suite and placed supine on the procedure table. The patient was prepped and draped in the usual sterile fashion. A procedural timeout was performed prior to beginning the procedure. ? Ultrasound was used to identify the intended artery for access. It was patent, appropriate for catheterization, and used for guidance of the micropuncture. Successful cannulation was achieved and images were saved to PACS for review. With ultrasound assistance and use of the Seldinger technique, the right radial artery was accessed using a 5 [place]anese micropuncture radial slender guide sheath kit. Once the sheath was placed, cocktail was slowly injected into the sheath. Please see chart for dosing used. At this time a De La Rosa 1 glide catheter with a Glidewire inside of it was then placed through the sheath. The catheter and glidewire were then advanced through the arterial system, and positioned into the left internal carotid artery.  At this time, we used a Martinez exchange wire to replace the diagnostic catheter with a Terumo radial guide sheath.   Once the guide sheath was in the proximal internal carotid artery angiogram showed severe vasospasm of the cervical portion of the right internal carotid artery.  Because of this vasospasm, I could not advance a wire catheter passed it.  At this time, I slowly injected 10 mg of verapamil through the base catheter over the course of 10 minutes.  Repeat angiogram showed improvement of the vasospasm.  At this time is able to advance otherwise and catheters past the area of vasospasm. ? Under direct fluoroscopic guidance and using digital roadmap technique, a Vecta 71 aspiration catheter was passed over a 0.027 phenom 27 microcatheter which was passed over a 0.014 inch Synchro guidewire through the guide sheath and into the left internal carotid artery and the wire and microcatheter were passed across the site of occlusion. The Vecta 71 was advanced to the level of the site of occlusion. ? The microwire and microcatheter were both removed. The Vecta 74 catheter was then connected to aspiration for approximately 5 minutes. Under aspiration, the Vecta 71 catheter was slowly removed and the Infinity was vigorously aspirated and flushed with heparinized saline. Follow up angiogram showed there was now patency of the M2 branch.  There was continued to not residual clot within the vessel.  In order to try to remove this, I then reloaded my system back into the base catheter. Under direct fluoroscopic guidance and using digital roadmap technique, a Vecta 71 aspiration catheter was passed over a 0.027 phenom 27 microcatheter which was passed over a 0.014 inch Synchro guidewire through the guide sheath and into the left internal carotid artery and the wire and microcatheter were passed across the site of occlusion.  The microwire was then removed and a 4 x 40 stent retriever Solitaire device was deployed across the occlusion.  Under aspiration this was removed and follow-up angiogram showed improved flow through the M2 vessel.   These findings were consistent with a TICI 3 thrombectomy. ? At this time, all catheters were removed and a TR band was inflated on the wrist prior to sheath removal. ? FINDINGS: Initial angiography of the left internal carotid artery shows normal distal cervical, petrous, cavernous and clinoid segments. The ophthalmic artery fills normally. The posterior communicating artery and anterior choroidal artery are easily visualized and of normal caliber. The anterior cerebral artery fills normally in all segments.  There is an occlusion of the dominant M2 trunk.  Follow-up angiogram after exchanging for the radial guide sheath shows severe vasospasm of the cervical segment of the internal carotid artery.  Follow-up angiogram after intra-arterial therapy with verapamil shows improvement.  Follow-up angiogram after the first lobectomy attempt shows that the dominant M2 trunk is now patent, but there is residual nonocclusive thrombus in the vessel.  Follow-up angiogram after second thrombectomy attempt shows continued patency of the M2 trunk with improved residual thrombus in the vessel.  These findings are consistent with a TICI 3 thrombectomy. ? Flour Time (min): 534 ? Air Kerma (mGy): 24.2 ? Contrast: 100 cc of 320 mg/ml Visipaque ?     TICI 3 thrombectomy of an acute left M2 occlusion.  Additionally, successful vasospasm treatment of the cervical segment of the ICA with intra-arterial verapamil.  No complications noted.     Doppler Transcranial Microbubble Injection CAR    Result Date: 3/3/2022  Velocity in the distal left MCA is modestly elevated at 117 cm/s in comparison with that of the right Otherwise, mean velocities and waveforms are normal in the right and left MCA and terminal ICA Following injection of agitated saline, both before and after Valsalva, no abnormal signals are seen in the left MCA Negative bubble study                        Results for orders placed during the hospital encounter of  03/02/22    Doppler Transcranial Microbubble Injection CAR    Interpretation Summary  Velocity in the distal left MCA is modestly elevated at 117 cm/s in comparison with that of the right    Otherwise, mean velocities and waveforms are normal in the right and left MCA and terminal ICA    Following injection of agitated saline, both before and after Valsalva, no abnormal signals are seen in the left MCA    Negative bubble study      Results for orders placed during the hospital encounter of 03/02/22    Doppler Transcranial Microbubble Injection CAR    Interpretation Summary  Velocity in the distal left MCA is modestly elevated at 117 cm/s in comparison with that of the right    Otherwise, mean velocities and waveforms are normal in the right and left MCA and terminal ICA    Following injection of agitated saline, both before and after Valsalva, no abnormal signals are seen in the left MCA    Negative bubble study      Results for orders placed during the hospital encounter of 03/02/22    Adult Transesophageal Echo (LINDA) W/ Cont if Necessary Per Protocol    Interpretation Summary  · Estimated left ventricular EF = 60% Left ventricular systolic function is normal.  · Saline test results are negative for intracardiac shunting.  · The most likely source based on the study is the aortic atheroma in her aortic arch.    Anesthesia: Cath lab/Echo lab moderate sedation    I was present with the patient for the duration of moderate sedation and supervised staff who had no other duties and monitored the patient for the entire procedure.    Name of independent trained observer: Carine Guerrero RN  Intra-service start time: 1005  Intra-service end time: 1032      Plan for Follow-up of Pending Labs/Results:    Discharge Details        Discharge Medications      New Medications      Instructions Start Date   aspirin 81 MG chewable tablet   81 mg, Oral, Daily      clopidogrel 75 MG tablet  Commonly known as: PLAVIX   75 mg, Oral,  Daily         Changes to Medications      Instructions Start Date   atorvastatin 80 MG tablet  Commonly known as: LIPITOR  What changed:   · medication strength  · how much to take  · when to take this   80 mg, Oral, Nightly         Continue These Medications      Instructions Start Date   DULoxetine 60 MG capsule  Commonly known as: CYMBALTA   60 mg, Oral, Daily      famotidine 20 MG tablet  Commonly known as: PEPCID   20 mg, 2 Times Daily      metFORMIN 500 MG tablet  Commonly known as: GLUCOPHAGE   500 mg, Oral, 2 Times Daily With Meals      OXcarbazepine 300 MG tablet  Commonly known as: TRILEPTAL   300 mg, Oral, 2 Times Daily      Zetia 10 MG tablet  Generic drug: ezetimibe   10 mg, Daily         Stop These Medications    omeprazole 20 MG capsule  Commonly known as: priLOSEC          No Known Allergies    Discharge Disposition: Rehab, see [facility]  Rehab Facility or Unit (DC - External)  Diet:  Hospital:  Diet Order   Procedures   • Diet Dysphagia; IV - Mechanical Soft No Mixed Consistencies; Nectar / Syrup Thick; No Straws       Activity: As tolerated      Restrictions or Other Recommendations:  None       CODE STATUS:    Code Status and Medical Interventions:   Ordered at: 03/02/22 1716     Code Status (Patient has no pulse and is not breathing):    CPR (Attempt to Resuscitate)     Medical Interventions (Patient has pulse or is breathing):    Full Support       No future appointments.      Geena Blanton MD  03/08/22      Time Spent on Discharge:  I spent  25 minutes on this discharge activity which included: face-to-face encounter with the patient, reviewing the data in the system, coordination of the care with the nursing staff as well as consultants, documentation, and entering orders.

## 2022-03-08 NOTE — THERAPY DISCHARGE NOTE
Patient Name: Maryana Luevano  : 1960    MRN: [id number]                              Today's Date: 3/8/2022       Admit Date: 3/2/2022    Visit Dx:     ICD-10-CM ICD-9-CM   1. Oropharyngeal dysphagia  R13.12 787.22   2. Acute ischemic stroke (HCC)  I63.9 434.91   3. Expressive aphasia  R47.01 784.3   4. Right-sided sensory deficit present  R44.9 781.99   5. Facial weakness, post-stroke  I69.392 438.83     Patient Active Problem List   Diagnosis   • AIS   • Hypertension   • Dyslipidemia   • T2DM    • Seizure disorder (on Trileptal)   • Depression     Past Medical History:   Diagnosis Date   • Depression 3/2/2022   • Dyslipidemia 3/2/2022   • Hypertension 3/2/2022   • Seizure disorder (on Trileptal) 3/2/2022   • T2DM  3/2/2022     Past Surgical History:   Procedure Laterality Date   • INTERVENTIONAL RADIOLOGY PROCEDURE Bilateral 3/2/2022    Procedure: CAROTID CEREBRAL ANGIOGRAM BILATERAL;  Surgeon: Hamilton Nelson MD;  Location: [facility] INVASIVE LOCATION;  Service: Interventional Radiology;  Laterality: Bilateral;      General Information     Row Name 22          Physical Therapy Time and Intention    Document Type therapy note (daily note);discharge treatment  -LR     Mode of Treatment physical therapy;individual therapy  -LR     Row Name 22          General Information    Existing Precautions/Restrictions fall;other (see comments)  expressive aphasia, R visual field cut  -LR     Barriers to Rehab medically complex  communication barrier (expressive aphasia)  -LR     Row Name 22          Cognition    Orientation Status (Cognition) oriented x 4  -LR     Row Name 22          Safety Issues, Functional Mobility    Safety Issues Affecting Function (Mobility) safety precautions follow-through/compliance;safety precaution awareness  -LR     Impairments Affecting Function (Mobility) strength;balance;endurance/activity tolerance;visual/perceptual  -LR           User Key   (r) = Recorded By, (t) = Taken By, (c) = Cosigned By    Initials Name Provider Type    Vilma Pablo PT Physical Therapist               Mobility     Row Name 03/08/22 0917          Bed Mobility    Supine-Sit [place] (Bed Mobility) not tested  -LR     Comment, (Bed Mobility) Sitting EOB on arrival and at end of treatment.  -LR     Row Name 03/08/22 0917          Transfers    Comment, (Transfers) Verbal cues to push up from bed to stand instead of pulling up on RW to stand.  -LR     Row Name 03/08/22 0917          Bed-Chair Transfer    Bed-Chair Blair (Transfers) not tested  -LR     Row Name 03/08/22 0917          Sit-Stand Transfer    Sit-Stand Blair (Transfers) verbal cues;supervision  -LR     Assistive Device (Sit-Stand Transfers) walker, front-wheeled  -LR     Row Name 03/08/22 0917          Gait/Stairs (Locomotion)    [place]air Level (Gait) verbal cues;standby assist  -LR     Assistive Device (Gait) walker, front-wheeled  -LR     Distance in Feet (Gait) 450  -LR     Deviations/Abnormal Patterns (Gait) bilateral deviations;keily decreased;gait speed decreased  -LR     Bilateral Gait Deviations forward flexed posture  -LR     [place]air Level (Stairs) not tested  -LR     Comment, (Gait/Stairs) Patient ambulated with step through gait pattern at slow pace with good upright posture. No veering to the right today with gait. Patient ambulated last 100 feet of ambulation without RW and had no LOB or unsteadiness.  -LR           User Key  (r) = Recorded By, (t) = Taken By, (c) = Cosigned By    Initials Name Provider Type    Vilma Pablo PT Physical Therapist               Obj/Interventions     Row Name 03/08/22 0917          Motor Skills    Therapeutic Exercise ankle;knee;hip;other (see comments)  cues for technique; no assist required  -LR     Row Name 03/08/22 0917          Hip (Therapeutic Exercise)    Hip Strengthening (Therapeutic Exercise)  bilateral;standing;aBduction;10 repetitions;extension;mini squats  -     Row Name 03/08/22 0917          Knee (Therapeutic Exercise)    Knee (Therapeutic Exercise) strengthening exercise  -LR     Knee Strengthening (Therapeutic Exercise) bilateral;marching while standing;standing;10 repetitions  standing calf raises  -     Row Name 03/08/22 0917          Balance    Balance Assessment sitting static balance;sitting dynamic balance;standing static balance;standing dynamic balance  -LR     Static Sitting Balance independent  -LR     Dynamic Sitting Balance independent  -LR     Position, Sitting Balance unsupported;sitting edge of bed  -LR     Static Standing Balance supervision  -LR     Dynamic Standing Balance supervision  -LR     Position/Device Used, Standing Balance walker, front-wheeled;supported  -LR           User Key  (r) = Recorded By, (t) = Taken By, (c) = Cosigned By    Initials Name Provider Type    LR Vilma Farah, PT Physical Therapist               Goals/Plan     Row Name 03/08/22 0917          Bed Mobility Goal 1 (PT)    Activity/Assistive Device (Bed Mobility Goal 1, PT) sit to supine/supine to sit  -LR     [place] Level/Cues Needed (Bed Mobility Goal 1, PT) supervision required  -LR     Time Frame (Bed Mobility Goal 1, PT) long term goal (LTG);2 weeks  -LR     Progress/Outcomes (Bed Mobility Goal 1, PT) goal not met;discharged from facility  -     Row Name 03/08/22 0917          Transfer Goal 1 (PT)    Activity/Assistive Device (Transfer Goal 1, PT) sit-to-stand/stand-to-sit;bed-to-chair/chair-to-bed;walker, rolling  -LR     [place] Level/Cues Needed (Transfer Goal 1, PT) supervision required  -LR     Time Frame (Transfer Goal 1, PT) long term goal (LTG);2 weeks  -LR     Progress/Outcome (Transfer Goal 1, PT) goal met;good progress toward goal  -     Row Name 03/08/22 0917          Gait Training Goal 1 (PT)    Activity/Assistive Device (Gait Training Goal 1, PT) gait  (walking locomotion);walker, rolling  -LR     [place] Level (Gait Training Goal 1, PT) modified independence  -LR     Distance (Gait Training Goal 1, PT) 500 feet  -LR     Time Frame (Gait Training Goal 1, PT) long term goal (LTG);2 weeks  -LR     Progress/Outcome (Gait Training Goal 1, PT) good progress toward goal;goal not met;discharged from facility  -LR           User Key  (r) = Recorded By, (t) = Taken By, (c) = Cosigned By    Initials Name Provider Type    LR Vilma Farah, PT Physical Therapist               Clinical Impression     Row Name 03/08/22 0917          Pain    Pretreatment Pain Rating 0/10 - no pain  -LR     Posttreatment Pain Rating 0/10 - no pain  -LR     Pain Intervention(s) Ambulation/increased activity;Repositioned  -LR     Row Name 03/08/22 0917          Plan of Care Review    Plan of Care Reviewed With patient  -LR     Progress improving  -LR     Outcome Evaluation Patient increased gait distance to 450 feet with RW, CGA, ambulated last 100 feet without RW and had no LOB or unsteadiness. No veering to the right with ambulation today. Patient has been d/c to [facility] today.  -LR     Row Name 03/08/22 0917          Therapy Assessment/Plan (PT)    Rehab Potential (PT) good, to achieve stated therapy goals  -LR     Criteria for Skilled Interventions Met (PT) yes;meets criteria;skilled treatment is necessary  -LR     Row Name 03/08/22 0917          Vital Signs    Pre Systolic BP Rehab 135  -LR     Pre Treatment Diastolic BP 66  -LR     Post Systolic BP Rehab 145  -LR     Post Treatment Diastolic BP 85  -LR     Pretreatment Heart Rate (beats/min) --  Heart monitor not reading  -LR     Pre Patient Position Sitting  -LR     Post Patient Position Sitting  -LR     Row Name 03/08/22 0917          Positioning and Restraints    Pre-Treatment Position in bed  -LR     Post Treatment Position bed  -LR     In Bed notified nsg;call light within reach;encouraged to call for assist;side rails up x2   -LR           User Key  (r) = Recorded By, (t) = Taken By, (c) = Cosigned By    Initials Name Provider Type    Vlima Pablo, AMAURI Physical Therapist               Outcome Measures     Row Name 03/08/22 0917          How much help from another person do you currently need...    Turning from your back to your side while in flat bed without using bedrails? 4  -LR     Moving from lying on back to sitting on the side of a flat bed without bedrails? 4  -LR     Moving to and from a bed to a chair (including a wheelchair)? 3  -LR     Standing up from a chair using your arms (e.g., wheelchair, bedside chair)? 3  -LR     Climbing 3-5 steps with a railing? 3  -LR     To walk in hospital room? 3  -LR     AM-PAC 6 Clicks Score (PT) 20  -LR     Row Name 03/08/22 0917          Functional Assessment    Outcome Measure Options AM-PAC 6 Clicks Basic Mobility (PT)  -LR           User Key  (r) = Recorded By, (t) = Taken By, (c) = Cosigned By    Initials Name Provider Type    Vilma Pablo, AMAURI Physical Therapist              Physical Therapy Education                 Title: PT OT SLP Therapies (Resolved)     Topic: Physical Therapy (Resolved)     Point: Mobility training (Resolved)     Learning Progress Summary           Patient Acceptance, E,D, VU,DU by LR at 3/8/2022 0917    Comment: Educated on benefits of mobility, safety with mobility, correct sit<->stand t/f technique, correct gait mechanics, and progression of POC.    Acceptance, E,D, VU,NR by LR at 3/7/2022 1411    Comment: Educated on safety with mobility, LE strengthening, correct sit<->stand t/f technique, correct gait mechanics, and progression of POC.    Acceptance, E, NR by KG at 3/6/2022 1427    Acceptance, E, NR by AE at 3/3/2022 1020   Family Acceptance, E,D, VU,NR by LR at 3/7/2022 1411    Comment: Educated on safety with mobility, LE strengthening, correct sit<->stand t/f technique, correct gait mechanics, and progression of POC.                    Point: Home exercise program (Resolved)     Learning Progress Summary           Patient Acceptance, E,D, VU,DU by LR at 3/8/2022 0917    Comment: Educated on benefits of mobility, safety with mobility, correct sit<->stand t/f technique, correct gait mechanics, and progression of POC.    Acceptance, E,D, VU,NR by LR at 3/7/2022 1411    Comment: Educated on safety with mobility, LE strengthening, correct sit<->stand t/f technique, correct gait mechanics, and progression of POC.    Acceptance, E, NR by KG at 3/6/2022 1427    Acceptance, E, NR by AE at 3/3/2022 1020   Family Acceptance, E,D, VU,NR by LR at 3/7/2022 1411    Comment: Educated on safety with mobility, LE strengthening, correct sit<->stand t/f technique, correct gait mechanics, and progression of POC.                   Point: Body mechanics (Resolved)     Learning Progress Summary           Patient Acceptance, E,D, VU,DU by LR at 3/8/2022 0917    Comment: Educated on benefits of mobility, safety with mobility, correct sit<->stand t/f technique, correct gait mechanics, and progression of POC.    Acceptance, E,D, VU,NR by LR at 3/7/2022 1411    Comment: Educated on safety with mobility, LE strengthening, correct sit<->stand t/f technique, correct gait mechanics, and progression of POC.    Acceptance, E, NR by KG at 3/6/2022 1427    Acceptance, E, NR by AE at 3/3/2022 1020   Family Acceptance, E,D, VU,NR by LR at 3/7/2022 1411    Comment: Educated on safety with mobility, LE strengthening, correct sit<->stand t/f technique, correct gait mechanics, and progression of POC.                   Point: Precautions (Resolved)     Learning Progress Summary           Patient Acceptance, E,D, VU,DU by LR at 3/8/2022 0917    Comment: Educated on benefits of mobility, safety with mobility, correct sit<->stand t/f technique, correct gait mechanics, and progression of POC.    Acceptance, E,D, VU,NR by LR at 3/7/2022 1411    Comment: Educated on safety with mobility, LE  strengthening, correct sit<->stand t/f technique, correct gait mechanics, and progression of POC.    Acceptance, E, NR by KG at 3/6/2022 1427    Acceptance, E, NR by AE at 3/3/2022 1020   Family Acceptance, E,D, VU,NR by LR at 3/7/2022 1411    Comment: Educated on safety with mobility, LE strengthening, correct sit<->stand t/f technique, correct gait mechanics, and progression of POC.                               User Key     Initials Effective Dates Name Provider Type Discipline    LR 06/16/21 -  Vilma Farah, PT Physical Therapist PT    KG 05/22/20 -  Triny Scott, PT Physical Therapist PT    AE 09/21/21 -  Ron Alvarez, PT Physical Therapist PT              PT Recommendation and Plan     Plan of Care Reviewed With: patient  Progress: improving  Outcome Evaluation: Patient increased gait distance to 450 feet with RW, CGA, ambulated last 100 feet without RW and had no LOB or unsteadiness. No veering to the right with ambulation today. Patient has been d/c to [facility] today.     Time Calculation:    PT Charges     Row Name 03/08/22 0917             Time Calculation    Start Time 0917  -LR      PT Received On 03/08/22  -LR      PT Goal Re-Cert Due Date 03/13/22  -LR              Timed Charges    47898 - PT Therapeutic Exercise Minutes 5  -LR      92284 - Gait Training Minutes  10  -LR              Total Minutes    Timed Charges Total Minutes 15  -LR       Total Minutes 15  -LR            User Key  (r) = Recorded By, (t) = Taken By, (c) = Cosigned By    Initials Name Provider Type    LR Vilma Farah, PT Physical Therapist              Therapy Charges for Today     Code Description Service Date Service Provider Modifiers Qty    65463110408 HC GAIT TRAINING EA 15 MIN 3/7/2022 Vilma Farah, PT GP 1    17291720716 HC GAIT TRAINING EA 15 MIN 3/8/2022 Vilma Farah, PT GP 1          PT G-Codes  Outcome Measure Options: AM-PAC 6 Clicks Basic Mobility (PT)  AM-PAC 6 Clicks  Score (PT): 20  AM-PAC 6 Clicks Score (OT): 19    PT Discharge Summary  Anticipated Discharge Disposition (PT): inpatient rehabilitation facility  Reason for Discharge: Discharge from facility  Outcomes Achieved: Patient able to partially acheive established goals  Discharge Destination: Inpatient rehabilitation facility    Vilma Farah, PT  3/8/2022

## 2022-03-08 NOTE — EXTERNAL PATIENT INSTRUCTIONS
Patient Education   Table of Contents       Aspirin Tablets       Clopidogrel Tablets       Ischemic Stroke       Ischemic Stroke       Stroke Prevention       Stroke Prevention     To view videos and all your education online visit,   [email or web address]   or scan this QR code with your smartphone.                  Aspirin Tablets     What is this medication?   ASPIRIN (AS pir in) lowers the risk of heart attack, stroke, or blood clot. It may also be used to treat mild to moderate pain, inflammation, or arthritis. It belongs to a group of medications called NSAIDs.   This medicine may be used for other purposes; ask your health care provider or pharmacist if you have questions.   COMMON BRAND NAME(S): Aspir-Low, Aspir-Shayla, Aspirtab, Evan Advanced Aspirin, Evan Aspirin, Evan Aspirin Extra Strength, Evan Aspirin Plus, Evan Extra Strength, Evan Extra Strength Plus, Evan Genuine Aspirin, Evan Womens Aspirin, Bufferin, Bufferin Extra Strength, Bufferin Low Dose   What should I tell my care team before I take this medication?   They need to know if you have any of these conditions:         Anemia       Asthma       Bleeding problems       Diabetes       Gout       History of stomach ulcers or bleeding       If you often drink alcohol       Kidney disease       Liver disease       Low level of vitamin K       Lupus       Smoke tobacco       An unusual or allergic reaction to aspirin, tartrazine dye, other medications, dyes, or preservatives       Pregnant or trying to get pregnant       Breast-feeding     How should I use this medication?   Take this medication by mouth with a glass of water. Follow the directions on the package or prescription label. You can take this medication with or without food. If it upsets your stomach, take it with food. Do not take it more often than directed.   Talk to your care team about the use of this medication in children. While this medication may be prescribed for  children as young as 12 years of age for selected conditions, precautions do apply. Children and teenagers should not use this medication to treat chicken pox or flu symptoms unless directed by a care team.   Patients over 65 years old may have a stronger reaction and need a smaller dose.   Overdosage: If you think you have taken too much of this medicine contact a poison control center or emergency room at once.   NOTE: This medicine is only for you. Do not share this medicine with others.   What if I miss a dose?   If you are taking this medication on a regular schedule and miss a dose, take it as soon as you can. If it is almost time for your next dose, take only that dose. Do not take double or extra doses.   What may interact with this medication?   Do not take this medication with any of the following:         Cidofovir       Ketorolac       Probenecid     This medication may also interact with the following:         Alcohol       Alendronate       Bismuth subsalicylate       Flavocoxid       Herbal supplements like feverfew, garlic, catherine, ginkgo biloba, horse chestnut       Medications for diabetes or glaucoma like acetazolamide, methazolamide       Medications for gout       Medications that prevent or treat blood clots like apixaban, clopidogrel, enoxaparin, heparin, rivaroxaban, warfarin       Other aspirin and aspirin-like medications       NSAIDs, medications for pain and inflammation, like ibuprofen or naproxen       Pemetrexed       Sulfinpyrazone       Varicella live vaccine     This list may not describe all possible interactions. Give your health care provider a list of all the medicines, herbs, non-prescription drugs, or dietary supplements you use. Also tell them if you smoke, drink alcohol, or use illegal drugs. Some items may interact with your medicine.   What should I watch for while using this medication?   If you are treating yourself for pain, tell your doctor or health care provider if  the pain lasts more than 10 days, if it gets worse, or if there is a new or different kind of pain. Tell your doctor if you see redness or swelling. Also, check with your doctor if you have a fever that lasts for more than 3 days. Only take this medication to prevent heart attacks or blood clotting if prescribed by your doctor or health care provider.   Do not take other medications that contain aspirin, ibuprofen, or naproxen with this medication. Side effects such as stomach upset, nausea, or ulcers may be more likely to occur. Many non-prescription medications contain aspirin, ibuprofen, or naproxen. Always read labels carefully.   This medication can cause serious ulcers and bleeding in the stomach. It can happen with no warning. Smoking, drinking alcohol, older age, and poor health can also increase risks. Call your health care provider right away if you have stomach pain or blood in your vomit or stool.   Alcohol may interfere with the effect of this medication. Avoid alcoholic drinks.   This medication may cause serious skin reactions. They can happen weeks to months after starting the medication. Contact your health care provider right away if you notice fevers or flu-like symptoms with a rash. The rash may be red or purple and then turn into blisters or peeling of the skin. Or, you might notice a red rash with swelling of the face, lips or lymph nodes in your neck or under your arms.   Talk to your health care provider if you are pregnant before taking this medication. Taking this medication between weeks 20 and 30 of pregnancy may harm your unborn baby. Your health care provider will monitor you closely if you need to take it. After 30 weeks of pregnancy, do not take this medication.   Be careful brushing or flossing your teeth or using a toothpick because you may get an infection or bleed more easily. If you have any dental work done, tell your dentist you are receiving this medication.   This medication  may make it more difficult to get pregnant. Talk to your health care provider if you are concerned about your fertility.   What side effects may I notice from receiving this medication?   Side effects that you should report to your care team as soon as possible:         Allergic reactions-skin rash, itching, hives, swelling of the face, lips, tongue, or throat       Bleeding-bloody or black, tar-like stools, vomiting blood or brown material that looks like coffee grounds, red or dark brown urine, red or purple spots on skin, unusual bruising or bleeding       Hearing loss, ringing in ears       Kidney injury-decrease in the amount of urine, swelling of the ankles, hands, or feet       Liver injury-right upper belly pain, loss of appetite, nausea, light-colored stool, dark yellow or brown urine, yellowing of the skin or eyes, unusual weakness, fatigue       Rash, fever, and swollen lymph nodes     Side effects that usually do not require medical attention (report to your care team if they continue or are bothersome):         Headache       Loss of appetite       Nausea       Upset stomach     This list may not describe all possible side effects. Call your doctor for medical advice about side effects. You may report side effects to FDA at 5-884-FDA-6377.   Where should I keep my medication?   Keep out of the reach of children and pets.   Store at room temperature between 15 and 30 degrees C (59 and 86 degrees F). Protect from heat and moisture. Get rid of any unused medication after the expiration date.   Do not use this medication if it has a strong vinegar smell.   To get rid of medications that are no longer needed or have :         Take the medication to a medication take-back program. Check with your pharmacy or law enforcement to find a location.       If you cannot return the medication, check the label or package insert to see if the medication should be thrown out in the garbage or flushed down the  toilet. If you are not sure, ask your care team. If it is safe to put it in the trash, empty the medication out of the container. Mix the medication with cat litter, dirt, coffee grounds, or other unwanted substance. Seal the mixture in a bag or container. Put it in the trash.     NOTE: This sheet is a summary. It may not cover all possible information. If you have questions about this medicine, talk to your doctor, pharmacist, or health care provider.     ? 2022 Elsevier/Gold Standard (2021-12-30 17:11:45)         Clopidogrel Tablets     What is this medication?   CLOPIDOGREL (kloh PID oh grel) lowers the risk of heart attack, stroke, or blood clots. It prevents blood cells (platelets) from clumping together to form a clot. It belongs to a group of medications called antiplatelets.   This medicine may be used for other purposes; ask your health care provider or pharmacist if you have questions.   COMMON BRAND NAME(S): Plavix   What should I tell my care team before I take this medication?   They need to know if you have any of the following conditions:         Bleeding disorders       Bleeding in the brain       Having surgery       History of stomach bleeding       An unusual or allergic reaction to clopidogrel, other medications, foods, dyes, or preservatives       Pregnant or trying to get pregnant       Breast-feeding     How should I use this medication?   Take this medication by mouth with a glass of water. Follow the directions on the prescription label. You may take this medication with or without food. If it upsets your stomach, take it with food. Take your medication at regular intervals. Do not take it more often than directed. Do not stop taking except on your care team's advice.   A special MedGuide will be given to you by the pharmacist with each prescription and refill. Be sure to read this information carefully each time.   Talk to your care team about the use of this medication in children. Special  care may be needed.   Overdosage: If you think you have taken too much of this medicine contact a poison control center or emergency room at once.   NOTE: This medicine is only for you. Do not share this medicine with others.   What if I miss a dose?   If you miss a dose, take it as soon as you can. If it is almost time for your next dose, take only that dose. Do not take double or extra doses.   What may interact with this medication?   Do not take this medication with the following:         Dasabuvir; ombitasvir; paritaprevir; ritonavir       Defibrotide       Selexipag     This medication may also interact with the following:         Certain medications that treat or prevent blood clots like warfarin       Narcotic medications for pain       NSAIDs, medications for pain and inflammation, like ibuprofen or naproxen       Repaglinide       SNRIs, medications for depression, like desvenlafaxine, duloxetine, levomilnacipran, venlafaxine       SSRIs, medications for depression, like citalopram, escitalopram, fluoxetine, fluvoxamine, paroxetine, sertraline       Stomach acid blockers like cimetidine, esomeprazole, omeprazole     This list may not describe all possible interactions. Give your health care provider a list of all the medicines, herbs, non-prescription drugs, or dietary supplements you use. Also tell them if you smoke, drink alcohol, or use illegal drugs. Some items may interact with your medicine.   What should I watch for while using this medication?   Visit your care team for regular check-ups. Do not stop taking your medication unless your care team tells you to.   Notify your care team and seek emergency services if you develop sudden numbness or weakness of the face, arm, or leg, trouble speaking, confusion, trouble walking, loss of balance or coordination, dizziness, severe headache, or change in vision. These can be signs that your condition has gotten worse.   If you are going to have surgery or  dental work, tell your care team that you are taking this medication.   Certain genetic factors may reduce the effect of this medication. Your care team may use genetic tests to determine treatment.   Only take aspirin if you are instructed to. Low doses of aspirin are used with this medication to treat some conditions. Taking aspirin with this medication can increase your risk of bleeding, so you must be careful. Talk to your care team if you have questions.   What side effects may I notice from receiving this medication?   Side effects that you should report to your care team as soon as possible:         Allergic reactions-skin rash, itching, hives, swelling of the face, lips, tongue, or throat       Bleeding-bloody or black, tar-like stools, red or dark brown urine, vomiting blood or brown material that looks like coffee grounds, small, red or purple spots on the skin, unusual bleeding or bruising       TTP-purple spots on the skin or inside the mouth, pale skin, yellowing skin or eyes, unusual weakness or fatigue, fever, fast or irregular heartbeat, confusion, change in vision, trouble speaking, trouble walking     Side effects that usually do not require medical attention (report to your care team if they continue or are bothersome):         Diarrhea       Headache     This list may not describe all possible side effects. Call your doctor for medical advice about side effects. You may report side effects to FDA at 0-370-PWC-5943.   Where should I keep my medication?   Keep out of the reach of children and pets.   Store at room temperature of 59 to 86 degrees F (15 to 30 degrees C). Throw away any unused medication after the expiration date.   NOTE: This sheet is a summary. It may not cover all possible information. If you have questions about this medicine, talk to your doctor, pharmacist, or health care provider.     ? 2022 Elsevier/Gold Standard (2021-11-19 15:33:52)         Ischemic Stroke        An ischemic  stroke (cerebrovascular accident, or CVA) is the sudden death of brain tissue that occurs when an area of the brain does not get enough blood flow. This condition is a medical emergency that must be treated right away. An ischemic stroke can cause permanent loss of brain function. Losing brain function can cause problems with how different parts of the body work.     What are the causes?    This condition is caused by a decrease of blood flow to an area of the brain, which may be the result of:       A small blood clot (embolus) or a buildup of plaque in the blood vessels, called atherosclerosis, that blocks blood flow in the brain.       An abnormal heart rhythm called atrial fibrillation, which sends a small blood clot to the brain.       A blocked or damaged artery in the head or neck.       Certain infections.       Inflammation of the arteries in the brain (vasculitis).     Sometimes, the cause of ischemic stroke is not known.   What increases the risk?   The following factors may make you more likely to develop this condition:   Factors that you can change        High blood pressure (hypertension) or certain other medical conditions, such as:       Heart disease.       Diabetes mellitus.       High cholesterol.       Obesity.       Sleep apnea.       Migraine headache.       Smoking cigarettes or using other tobacco products.       Physical inactivity.       Heavy alcohol use.       Use of illegal drugs, especially cocaine and methamphetamine.       Taking birth control pills, especially if you also use tobacco.     Factors that you cannot change         Being older than age 60.       History of blood clots, stroke, or mini-stroke (transient ischemic attack, TIA).       History of high blood pressure when pregnant (preeclampsia), in women.       Family history of stroke.       Sickle cell disease.       Blood clotting disorders (hypercoagulable state).     What are the signs or symptoms?    Symptoms of this  condition usually develop suddenly, or you may notice them after waking from sleep. These sudden symptoms may include:       Weakness or numbness of your face, arm, or leg, especially on one side of your body.       Loss of balance or coordination.       Slurred speech, or aphasia. Aphasia is trouble speaking or trouble understanding speech or both.       Vision changes in one or both eyes. You may have double vision, blurred vision, or loss of vision.       Dizziness or confusion.       Nausea and vomiting.       Severe headache with no known cause.     If possible, write down the exact time that you last felt like your normal self and what time your symptoms started. Tell your health care provider.   If symptoms come and go, they could be signs of a TIA (transient ischemic attack). Get help right away, even if you feel better.   How is this diagnosed?    This condition may be diagnosed based on:       Your symptoms, your medical history, and a physical exam.       CT scan of the brain.       MRI.       Imaging tests that scan blood flow (circulation) in the brain. These may be CT angiogram, MRI angiogram, or cerebral angiogram.     You may need to see a health care provider who specializes in stroke care. A stroke specialist can be seen in person or through communication using telephone or television technology (telemedicine).    You may also have other tests, including:       Electrocardiogram (ECG).       Continuous heart monitoring.       Transthoracic echocardiogram (TTE).       Transesophageal echocardiogram (LINDA).       Carotid ultrasound.       Blood tests.       Sleep study to check for sleep apnea.     How is this treated?    Treatment for this condition depends on the duration, severity, and cause of your symptoms and on the area of the brain affected. It is very important to get treatment at the first sign of stroke symptoms. Some treatments work better if they are done within 3?6 hours of the start of  stroke symptoms. These initial treatments may include:       Thrombolytic medicine that is injected to dissolve the blood clot.       Treatments given directly to the affected artery to remove or dissolve the blood clot.       Medicines to control blood pressure.       Anticoagulant or antiplatelet medicines to thin the blood.      Other treatments may include:       Oxygen.       IV fluids.       Procedures to increase blood flow.     Medicines and diet changes may be used to help manage risk factors for stroke, such as diabetes, high cholesterol, and high blood pressure.   After a stroke, you may work with physical, speech, mental health, or occupational therapists to help you recover.   Follow these instructions at home:   Medicines         Take over-the-counter and prescription medicines only as told by your health care provider.      If you were told to take a medicine to thin your blood, take your medicine exactly as told, at the same time every day. This includes aspirin or an anticoagulant.       Taking too much blood-thinning medicine can cause bleeding.       If you do not take enough blood-thinning medicine, you will not be protected enough against another stroke and other problems.      Understand the side effects of taking anticoagulant medicine. When taking this type of medicine, make sure you:       Hold pressure over any cuts for longer than usual.       Tell your dentist and other health care providers that you are taking anticoagulants before you have any procedures that may cause bleeding.       Avoid activities that could cause injury or bruising.       Wear a medical alert bracelet or carry a card that lists what medicines you take.     Eating and drinking         Follow instructions from your health care provider about diet.       Eat healthy foods.       If your stroke affected your ability to swallow, you may need to take steps to avoid choking. These may include taking small bites when  "eating and eating foods that are soft or pureed.     Safety         Follow instructions from your health care team about physical activity.       Use a walker or cane as told by your health care provider.      Take steps to create a safe home environment to lower your risk of falls. These steps may include:       Having your home looked at by specialists.       Installing grab bars in the bedroom and bathroom.       Using safety equipment, such as raised toilets and a seat in the shower.     General instructions        Do not  use any products that contain nicotine or tobacco, such as cigarettes, e-cigarettes, and chewing tobacco. If you need help quitting, ask your health care provider.      If you drink alcohol:      Limit how much you use to:       0?1 drink a day for women.       0?2 drinks a day for men.           Be aware of how much alcohol is in your drink. In the [place] one drink equals one 12 oz bottle of beer (355 mL), one 5 oz glass of wine (148 mL), or one 1? oz glass of hard liquor (44 mL).         If you need help to stop using drugs or alcohol, ask your health care provider about a referral to a program or specialist.       Maintain an active and healthy lifestyle. Get regular exercise as told.       Wear a medical bracelet as told by your health care provider.       Keep all follow-up visits as told by your health care provider, including visits with all specialists on your health care team. This is important.   How is this prevented?   You can lower your risk of another stroke by managing high blood pressure, high cholesterol, diabetes, heart disease, sleep apnea, and obesity. Your risk can also be lowered by quitting smoking, limiting alcohol, and staying physically active.   Your health care provider will continue to help you with ways to prevent short-term and long-term problems caused by stroke.   Get help right away if:       You have any symptoms of a stroke. \"BE FAST\"  is an easy way to " remember the main warning signs of a stroke:      B - Balance  . Signs are dizziness, sudden trouble walking, or loss of balance.      E - Eyes  . Signs are trouble seeing or a sudden change in vision.      F - Face  . Signs are sudden weakness or numbness of the face, or the face or eyelid drooping on one side.      A - Arms  . Signs are weakness or numbness in an arm. This happens suddenly and usually on one side of the body.      S - Speech  . Signs are sudden trouble speaking, slurred speech, or trouble understanding what people say.      T - Time  . Time to call emergency services. Write down what time symptoms started.      You have other signs of a stroke, such as:       A sudden, severe headache with no known cause.       Nausea or vomiting.       Seizure.     These symptoms may represent a serious problem that is an emergency. Do not wait to see if the symptoms will go away. Get medical help right away. Call your local emergency services (911 in the [place] Do not drive yourself to the hospital.     Summary         An ischemic stroke (cerebrovascular accident, or CVA) is the sudden death of brain tissue that occurs when an area of the brain does not get enough blood flow.       Symptoms of this condition usually develop suddenly, or you may notice them after waking from sleep.       It is very important to get treatment at the first sign of stroke symptoms. Stroke is a medical emergency that must be treated right away.     This information is not intended to replace advice given to you by your health care provider. Make sure you discuss any questions you have with your health care provider.     Document Released: 12/18/2006Document Revised: 12/14/2020Document Reviewed: 12/14/2020     Elsevier Patient Education ? 2021 [employer]         Ischemic Stroke        An ischemic stroke is the sudden death of brain tissue. This type of stroke happens when part of the brain does not get enough blood. This can cause  lifelong change in how the brain works. This change can cause problems in other parts of the body.   This condition is an emergency. It must be treated right away.     What are the causes?    This condition is caused by lower blood flow to part of the brain. This may be due to:       A small clump, or clot, of blood.       A buildup of fatty substance (plaque) in the blood vessels.       An abnormal heart rhythm.       A blocked or damaged artery in the head or neck. Arteries are blood vessels that move blood away from the heart.       Infection.       Swelling of the arteries in the brain.     What increases the risk?   Things that you can change        Other medical problems, such as:       High blood pressure (hypertension).       Heart disease.       Diabetes.       High cholesterol.       Being very overweight (obese).       Paused or stopped breathing during sleep (sleep apnea).       Migraine headache.       Smoking or other tobacco use.       Not being active.       Heavy alcohol use.       Using drugs.       Taking birth control pills.     Things that you cannot change         Being older than age 60.       Having had blood clots, stroke, or mini-stroke (transient ischemic attack, TIA) before.       High blood pressure when you are pregnant, in women.       Stroke in your family.       Sickle cell disease.       Disorders that affect how blood clots.     What are the signs or symptoms?    Symptoms of a stroke normally happen all of a sudden. They can include:       Weakness or loss of feeling in your face, arm, or leg, often on one side of the body.       Loss of balance.       Loss of controlled, correct movement of your body parts (coordination).       Slurred speech.       Trouble talking or trouble understanding what people say.       Problems with not seeing things correctly.       Feeling dizzy or confused.       Feeling like you may vomit (nauseous) and vomiting.       A very bad headache for no  reason.     If you can, write down the exact time that you last felt normal and what time you first had symptoms. Tell your doctor. If symptoms come and go, they could be caused by a mini-stroke. Get help right away, even if you feel better.   How is this treated?    You must get treatment as soon as you have stroke symptoms. Some treatments work better if they are done within 3?6 hours of your first symptoms. You may get medicines that:       Take out or break up the blood clot.       Control blood pressure.       Thin your blood.      Other treatments may include:       Treatment for breathing.       Fluids through an IV tube.       Procedures that make blood flow better.     You may need to manage your risk of stroke with medicines and diet changes. After a stroke, you may get therapy to help you get better.   Follow these instructions at home:   Medicines         Take over-the-counter and prescription medicines only as told by your doctor.      If you were told to take aspirin or another medicine to thin your blood, take it exactly as told. Take it at the same time each day.       Taking too much of the medicine can cause bleeding.       If you do not take enough medicine, it may not work as well.      Know the side effects of your medicines. If you are taking a blood thinner, make sure you:       Hold pressure over any cuts for longer than normal.       Tell your dentist and other doctors that you take this medicine.       Avoid activities that could hurt or bruise you.     Eating and drinking         Follow instructions from your doctor about diet.       Eat healthy foods.      If you have trouble swallowing:       Take small bites when eating.       Eat foods that are soft or pureed.     Safety         Follow instructions from your care team about physical activity.       Use a walker or cane as told by your doctor.      Keep your home safe so you do not fall. You may need to:       Have experts look at your  "home to make sure it is safe.       Put grab bars in the bedroom and bathroom.       Use raised toilets.       Put a seat in the shower.     General instructions        Do not  use any products that contain nicotine or tobacco, such as cigarettes, e-cigarettes, and chewing tobacco. If you need help quitting, ask your doctor.      If you drink alcohol:      Limit how much you use to:       0?1 drink a day for women.       0?2 drinks a day for men.           Be aware of how much alcohol is in your drink. In the [place] one drink equals one 12 oz bottle of beer (355 mL), one 5 oz glass of wine (148 mL), or one 1? oz glass of hard liquor (44 mL).         If you need help to stop using drugs or alcohol, ask your doctor to refer you to a program or specialist.       Stay active. Exercise as told.       Wear a medical bracelet as told by your doctor.       Keep all follow-up visits as told by your doctor. Go to visits with all specialists on your care team. This is important.   How is this prevented?    You can lower your risk of another stroke if you:       Manage high blood pressure, high cholesterol, diabetes, heart disease, sleep problems, and weight.       Quit smoking, limit alcohol, and stay active.     Work with your doctor to care for yourself after a stroke. This may keep you from getting more problems.   Get help right away if:       You have any signs of a stroke. \"BE FAST\"  is an easy way to remember the main warning signs:      B - Balance  . Signs are dizziness, sudden trouble walking, or loss of balance.      E - Eyes  . Signs are trouble seeing or a change in how you see.      F - Face  . Signs are sudden weakness or loss of feeling of the face, or the face or eyelid drooping on one side.      A - Arms  . Signs are weakness or loss of feeling in an arm. This happens suddenly and usually on one side of the body.      S - Speech  . Signs are sudden trouble speaking, slurred speech, or trouble understanding " what people say.      T - Time  . Time to call emergency services. Write down what time symptoms started.      You have other signs of a stroke, such as:       A sudden, very bad headache with no known cause.       Feeling like you may vomit.       Vomiting.       A seizure.     These symptoms may be an emergency. Do not wait to see if the symptoms will go away. Get medical help right away. Call your local emergency services (911 in the [place] Do not drive yourself to the hospital.     Summary         An ischemic stroke is the sudden death of brain tissue.       Symptoms of a stroke often happen all of a sudden.       You must get treatment as soon as you have stroke symptoms.       Stroke is an emergency. It must be treated right away.     This information is not intended to replace advice given to you by your health care provider. Make sure you discuss any questions you have with your health care provider.     Document Released: 12/06/2012Document Revised: 12/14/2020Document Reviewed: 12/14/2020     [employer] Patient Education ? 2021 [employer]         Stroke Prevention     Some medical conditions and behaviors are associated with a higher chance of having a stroke. You can help prevent a stroke by making nutrition, lifestyle, and other changes, including managing any medical conditions you may have.   What nutrition changes can be made?           Eat healthy foods. You can do this by:       Choosing foods high in fiber, such as fresh fruits and vegetables and whole grains.       Eating at least 5 or more servings of fruits and vegetables a day. Try to fill half of your plate at each meal with fruits and vegetables.       Choosing lean protein foods, such as lean cuts of meat, poultry without skin, fish, tofu, beans, and nuts.       Eating low-fat dairy products.       Avoiding foods that are high in salt (sodium). This can help lower blood pressure.       Avoiding foods that have saturated fat, trans fat, and  cholesterol. This can help prevent high cholesterol.       Avoiding processed and premade foods.      Follow your health care provider's specific guidelines for losing weight, controlling high blood pressure (hypertension), lowering high cholesterol, and managing diabetes. These may include:       Reducing your daily calorie intake.       Limiting your daily sodium intake to 1,500 milligrams (mg).       Using only healthy fats for cooking, such as olive oil, canola oil, or sunflower oil.       Counting your daily carbohydrate intake.       What lifestyle changes can be made?         Maintain a healthy weight. Talk to your health care provider about your ideal weight.       Get at least 30 minutes of moderate physical activity at least 5 days a week. Moderate activity includes brisk walking, biking, and swimming.      Do not  use any products that contain nicotine or tobacco, such as cigarettes and e-cigarettes. If you need help quitting, ask your health care provider. It may also be helpful to avoid exposure to secondhand smoke.       Limit alcohol intake to no more than 1 drink a day for nonpregnant women and 2 drinks a day for men. One drink equals 12 oz of beer, 5 oz of wine, or 1? oz of hard liquor.       Stop any illegal drug use.      Avoid taking birth control pills. Talk to your health care provider about the risks of taking birth control pills if:       You are over 35 years old.       You smoke.       You get migraines.       You have ever had a blood clot.     What other changes can be made?        Manage your cholesterol levels.       Eating a healthy diet is important for preventing high cholesterol. If cholesterol cannot be managed through diet alone, you may also need to take medicines.       Take any prescribed medicines to control your cholesterol as told by your health care provider.      Manage your diabetes.       Eating a healthy diet and exercising regularly are important parts of managing your  blood sugar. If your blood sugar cannot be managed through diet and exercise, you may need to take medicines.       Take any prescribed medicines to control your diabetes as told by your health care provider.      Control your hypertension.       To reduce your risk of stroke, try to keep your blood pressure below 130/80.       Eating a healthy diet and exercising regularly are an important part of controlling your blood pressure. If your blood pressure cannot be managed through diet and exercise, you may need to take medicines.       Take any prescribed medicines to control hypertension as told by your health care provider.       Ask your health care provider if you should monitor your blood pressure at home.       Have your blood pressure checked every year, even if your blood pressure is normal. Blood pressure increases with age and some medical conditions.       Get evaluated for sleep disorders (sleep apnea). Talk to your health care provider about getting a sleep evaluation if you snore a lot or have excessive sleepiness.       Take over-the-counter and prescription medicines only as told by your health care provider. Aspirin or blood thinners (antiplatelets or anticoagulants) may be recommended to reduce your risk of forming blood clots that can lead to stroke.       Make sure that any other medical conditions you have, such as atrial fibrillation or atherosclerosis, are managed.     What are the warning signs of a stroke?    The warning signs of a stroke can be easily remembered as BEFAST.      B is for balance. Signs include:       Dizziness.       Loss of balance or coordination.       Sudden trouble walking.      E is for eyes. Signs include:       A sudden change in vision.       Trouble seeing.      F is for face. Signs include:       Sudden weakness or numbness of the face.       The face or eyelid drooping to one side.      A is for arms. Signs include:       Sudden weakness or numbness of the arm,  usually on one side of the body.      S is for speech. Signs include:       Trouble speaking (aphasia).       Trouble understanding.      T is for time.      These symptoms may represent a serious problem that is an emergency. Do not wait to see if the symptoms will go away. Get medical help right away. Call your local emergency services (911 in the [place] Do not drive yourself to the hospital.      Other signs of stroke may include:       A sudden, severe headache with no known cause.       Nausea or vomiting.       Seizure.     Where to find more information    For more information, visit:       American Stroke Association: www.strokeassociation.org         National Stroke Association: www.stroke.org       Summary         You can prevent a stroke by eating healthy, exercising, not smoking, limiting alcohol intake, and managing any medical conditions you may have.       Do not use any products that contain nicotine or tobacco, such as cigarettes and e-cigarettes. If you need help quitting, ask your health care provider. It may also be helpful to avoid exposure to secondhand smoke.       Remember BEFAST for warning signs of stroke. Get help right away if you or a loved one has any of these signs.     This information is not intended to replace advice given to you by your health care provider. Make sure you discuss any questions you have with your health care provider.     Document Released: 01/25/2006Document Revised: 11/30/2018Document Reviewed: 01/23/2018     [email or web address] Patient Education ? 2021 [employer]         Stroke Prevention     Some medical conditions and lifestyle choices can lead to a higher risk for a stroke. You can help to prevent a stroke by eating healthy foods and exercising. It also helps to not smoke and to manage any health problems you may have.   How can this condition affect me?   A stroke is an emergency. It should be treated right away. A stroke can lead to brain damage or threaten your  life. There is a better chance of surviving and getting better after a stroke if you get medical help right away.   What can increase my risk?    The following medical conditions may increase your risk of a stroke:       Diseases of the heart and blood vessels (cardiovascular disease).       High blood pressure (hypertension).       Diabetes.       High cholesterol.       Sickle cell disease.       Problems with blood clotting.       Being very overweight.       Sleeping problems (obstructivesleep apnea).      Other risk factors include:       Being older than age 60.       A history of blood clots, stroke, or mini-stroke (TIA).       Race, ethnic background, or a family history of stroke.       Smoking or using tobacco products.       Taking birth control pills, especially if you smoke.       Heavy alcohol and drug use.       Not being active.     What actions can I take to prevent this?   Manage your health conditions        High cholesterol.       Eat a healthy diet. If this is not enough to manage your cholesterol, you may need to take medicines.       Take medicines as told by your doctor.      High blood pressure.       Try to keep your blood pressure below 130/80.       If your blood pressure cannot be managed through a healthy diet and regular exercise, you may need to take medicines.       Take medicines as told by your doctor.       Ask your doctor if you should check your blood pressure at home.       Have your blood pressure checked every year.      Diabetes.       Eat a healthy diet and get regular exercise. If your blood sugar (glucose) cannot be managed through diet and exercise, you may need to take medicines.       Take medicines as told by your doctor.      Talk to your doctor about getting checked for sleeping problems. Signs of a problem can include:       Snoring a lot.       Feeling very tired.       Make sure that you manage any other conditions you have.     Nutrition           Follow  instructions from your doctor about what to eat or drink. You may be told to:       Eat and drink fewer calories each day.       Limit how much salt (sodium) you use to 1,500 milligrams (mg) each day.       Use only healthy fats for cooking, such as olive oil, canola oil, and sunflower oil.      Eat healthy foods. To do this:       Choose foods that are high in fiber. These include whole grains, and fresh fruits and vegetables.       Eat at least 5 servings of fruits and vegetables a day. Try to fill one-half of your plate with fruits and vegetables at each meal.       Choose low-fat (lean) proteins. These include low-fat cuts of meat, chicken without skin, fish, tofu, beans, and nuts.       Eat low-fat dairy products.          Avoid foods that:       Are high in salt.       Have saturated fat.       Have trans fat.       Have cholesterol.       Are processed or pre-made.       Count how many carbohydrates you eat and drink each day.     Lifestyle        If you drink alcohol:      Limit how much you have to:       0?1 drink a day for women who are not pregnant.       0?2 drinks a day for men.           Know how much alcohol is in your drink. In the [place] one drink equals one 12 oz bottle of beer (355mL), one 5 oz glass of wine (148mL), or one 1? oz glass of hard liquor (44mL).        Do not  smoke or use any products that have nicotine or tobacco. If you need help quitting, ask your doctor.       Avoid secondhand smoke.      Do not  use drugs.   Activity            Try to stay at a healthy weight.      Get at least 30 minutes of exercise on most days, such as:       Fast walking.       Biking.       Swimming.       Medicines         Take over-the-counter and prescription medicines only as told by your doctor.      Avoid taking birth control pills. Talk to your doctor about the risks of taking birth control pills if:       You are over 35 years old.       You smoke.       You get very bad headaches.       You have  "had a blood clot.       Where to find more information         American Stroke Association: www.strokeassociation.org       Get help right away if:        You or a loved one has any signs of a stroke. \"BE FAST\"  is an easy way to remember the warning signs:      B - Balance.  Dizziness, sudden trouble walking, or loss of balance.      E - Eyes.  Trouble seeing or a change in how you see.      F - Face.  Sudden weakness or loss of feeling of the face. The face or eyelid may droop on one side.      A - Arms.  Weakness or loss of feeling in an arm. This happens all of a sudden and most often on one side of the body.      S - Speech.  Sudden trouble speaking, slurred speech, or trouble understanding what people say.      T - Time.  Time to call emergency services. Write down what time symptoms started.      You or a loved one has other signs of a stroke, such as:       A sudden, very bad headache with no known cause.       Feeling like you may vomit (nausea).       Vomiting.       A seizure.     These symptoms may be an emergency. Get help right away. Call your local emergency services (911 in the [place]      Do not wait to see if the symptoms will go away.      Do not drive yourself to the hospital.     Summary         You can help to prevent a stroke by eating healthy, exercising, and not smoking. It also helps to manage any health problems you have.      Do not  smoke or use any products that contain nicotine or tobacco.       Get help right away if you or a loved one has any signs of a stroke.     This information is not intended to replace advice given to you by your health care provider. Make sure you discuss any questions you have with your health care provider.     Document Released: 06/18/2013Document Revised: 07/19/2021Document Reviewed: 07/19/2021     [employer] Patient Education ? 2022 [employer]       "

## 2022-03-08 NOTE — PLAN OF CARE
VSS. SR on cardiac mx. NIH 3. Speech more clear, yet fragmental. Patieent ambulates in the room with SB assist. Denies pain/discomfort at this time. Will cont to mx. Call light in reach.

## 2022-03-08 NOTE — PLAN OF CARE
Problem: Adult Inpatient Plan of Care  Goal: Plan of Care Review  Recent Flowsheet Documentation  Taken 3/8/2022 0917 by Vilma Farah, PT  Progress: improving  Plan of Care Reviewed With: patient  Outcome Evaluation: Patient increased gait distance to 450 feet with RW, CGA, ambulated last 100 feet without RW and had no LOB or unsteadiness. No veering to the right with ambulation today. Patient has been d/c to [facility] today.   Goal Outcome Evaluation:  Plan of Care Reviewed With: patient        Progress: improving  Outcome Evaluation: Patient increased gait distance to 450 feet with RW, CGA, ambulated last 100 feet without RW and had no LOB or unsteadiness. No veering to the right with ambulation today. Patient has been d/c to [facility] today.

## 2022-03-09 NOTE — CONSULTS
Unable to see patient prior to d/c . Will follow up via letter. Not a candidate for the outpatient stroke/diabetes as patient d/c to another acute care facility (rehab)

## 2022-04-27 ENCOUNTER — OFFICE VISIT (OUTPATIENT)
Dept: NEUROLOGY | Facility: CLINIC | Age: 62
End: 2022-04-27

## 2022-04-27 VITALS
WEIGHT: 156 LBS | DIASTOLIC BLOOD PRESSURE: 64 MMHG | HEIGHT: 67 IN | SYSTOLIC BLOOD PRESSURE: 118 MMHG | HEART RATE: 79 BPM | BODY MASS INDEX: 24.48 KG/M2 | TEMPERATURE: 98 F

## 2022-04-27 DIAGNOSIS — G40.909 SEIZURE DISORDER: Chronic | ICD-10-CM

## 2022-04-27 DIAGNOSIS — Z86.73 HISTORY OF STROKE: Primary | ICD-10-CM

## 2022-04-27 PROCEDURE — 99214 OFFICE O/P EST MOD 30 MIN: CPT | Performed by: PSYCHIATRY & NEUROLOGY

## 2022-04-27 RX ORDER — CLOPIDOGREL BISULFATE 75 MG/1
75 TABLET ORAL DAILY
COMMUNITY

## 2022-04-27 NOTE — PROGRESS NOTES
"Subjective:    CC: Maryana Luevano is seen today in consultation at the request of No ref. provider found for Stroke       HPI:  Patient is a 61-year-old female with past medical history of hypertension, type 2 diabetes, seizure disorder on Trileptal, hyperlipidemia had sudden onset of speech difficulty and right-sided weakness in end of February 2022.  She was brought to the hospital and underwent detailed stroke work-up.  CT angiogram of brain showed left MCA inferior division clot for which she underwent mechanical thrombectomy with TICI 3 flow.  I reviewed CT angiogram of brain personally.  MRI brain was done which showed left MCA territory stroke.  CT angiogram of neck did not tell any vascular abnormalities or flow-limiting stenosis.  2D echocardiogram showed normal ejection fraction and valvular functions.  She was started on dual antiplatelet treatment with high-dose statins for secondary stroke prevention.  During hospital admission, she had significant expressive aphasia\" only 1-2 words which is improved and now she is able to complete sentences.  She does have mild to moderate expressive aphasia still.  She has not gotten formal speech therapy.  Right-sided weakness has resolved almost completely and she feels back to normal.    The following portions of the patient's history were reviewed today and updated as of 04/27/2022  : allergies, social history and problem list.  This document will be scanned to patient's chart.      Current Outpatient Medications:   •  aspirin 81 MG chewable tablet, Chew 1 tablet Daily., Disp: , Rfl:   •  atorvastatin (LIPITOR) 80 MG tablet, Take 1 tablet by mouth Every Night., Disp: , Rfl:   •  DULoxetine (CYMBALTA) 60 MG capsule, Take 60 mg by mouth Daily., Disp: , Rfl:   •  ezetimibe (ZETIA) 10 MG tablet, Take 10 mg by mouth Daily., Disp: , Rfl:   •  metFORMIN (GLUCOPHAGE) 500 MG tablet, Take 500 mg by mouth 2 (Two) Times a Day With Meals., Disp: , Rfl:   •  OXcarbazepine " "(TRILEPTAL) 300 MG tablet, Take 300 mg by mouth 2 (Two) Times a Day., Disp: , Rfl:   •  clopidogrel (Plavix) 75 MG tablet, Take 75 mg by mouth Daily., Disp: , Rfl:   •  famotidine (PEPCID) 20 MG tablet, Take 20 mg by mouth 2 (Two) Times a Day. (Patient not taking: No sig reported), Disp: , Rfl:    Past Medical History:   Diagnosis Date   • Depression 3/2/2022   • Dyslipidemia 3/2/2022   • Hypertension 3/2/2022   • Seizure disorder (on Trileptal) 3/2/2022   • T2DM  3/2/2022      Past Surgical History:   Procedure Laterality Date   • INTERVENTIONAL RADIOLOGY PROCEDURE Bilateral 3/2/2022    Procedure: CAROTID CEREBRAL ANGIOGRAM BILATERAL;  Surgeon: Hamilton Nelson MD;  Location: [facility] INVASIVE LOCATION;  Service: Interventional Radiology;  Laterality: Bilateral;      History reviewed. No pertinent family history.   Review of Systems    All other systems reviewed and are negative     Objective:    /64   Pulse 79   Temp 98 °F (36.7 °C)   Ht 170 cm (66.93\")   Wt 70.8 kg (156 lb)   BMI 24.48 kg/m²     Neurology Exam:    General apperance: NAD.     Mental status: Alert, awake and oriented to time place and person.    Recent and Remote memory: Can recall 3/3 objects at 5 minutes. Can recall historical events.     Attention span and Concentration: Serial 7s: Normal.     Fund of knowledge:  Normal.     Language and Speech: Rate expressive aphasia noted.    Naming , Repitition and Comprehension:  Can name objects, repeat a sentence and follow commands.  Moderate's expressive aphasia noted.    Cranial Nerves:   CN II: Visual fields are full. Intact. Fundi - Normal, No papillederma, Pupils - JOSE  CN III, IV and VI: Extraocular movements are intact. Normal saccades.   CN V: Facial sensation is intact.   CN VII: Muscles of facial expression reveal no asymmetry. Intact.   CN VIII: Hearing is intact. Whispered voice intact.   CN IX and X: Palate elevates symmetrically. Intact  CN XI: Shoulder shrug is intact. "   CN XII: Tongue is midline without evidence of atrophy or fasciculation.     Motor:  Right UE muscle strength 5/5. Normal tone.     Left UE muscle strength 5/5. Normal tone.      Right LE muscle strength5/5. Normal tone.     Left LE muscle strength 5/5. Normal tone.      Sensory: Normal light touch, vibration and pinprick sensation bilaterally.    DTRs: 2+ bilaterally in upper and lower extremities.    Babinski: Negative bilaterally.    Co-ordination: Normal finger-to-nose, heel to shin B/L.    Rhomberg: Negative.    Gait: Normal.    Cardiovascular: Regular rate and rhythm without murmur, gallop or rub.    Ophthalmoscopic exam: Normal fundi, no papilledema.    Assessment and Plan:  1. History of stroke  2.  Seizure disorder  Patient with left MCA territory stroke- atheroembolic with left MCA inferior division clot status post mechanical thrombectomy in end of February 2022.  She had severe expressive aphasia which has improved and currently she is able to complete sentences.  She will need speech therapy for further improvement in this so appropriate referral will be made.  Have advised her to continue dual antiplatelet treatment till end of May and then stop Plavix and increase aspirin to full dose 325 mg daily along with Lipitor 80 mg daily for secondary stroke prevention.  She remains seizure-free on Trileptal 300 mg twice daily.  Continue with slight glycemic control and I will see her back in 3 months for follow-up.       Return in about 3 months (around 7/27/2022).     Dav Blair MD

## 2022-06-07 ENCOUNTER — CALL CENTER PROGRAMS (OUTPATIENT)
Dept: CALL CENTER | Facility: HOSPITAL | Age: 62
End: 2022-06-07

## 2022-06-07 NOTE — OUTREACH NOTE
Stroke Kia Survey    Flowsheet Row Responses   Facility patient discharged from? [facility]   Attempt successful No   Unsuccessful attempts Attempt 1          MIKE NEWBERRY - Registered Nurse

## 2022-06-13 ENCOUNTER — CALL CENTER PROGRAMS (OUTPATIENT)
Dept: CALL CENTER | Facility: HOSPITAL | Age: 62
End: 2022-06-13

## 2022-06-14 ENCOUNTER — CALL CENTER PROGRAMS (OUTPATIENT)
Dept: CALL CENTER | Facility: HOSPITAL | Age: 62
End: 2022-06-14

## 2022-06-14 NOTE — OUTREACH NOTE
Stroke Kia Survey    Flowsheet Row Responses   Facility patient discharged from? [facility]   Attempt successful No   Unsuccessful attempts Attempt 3   Call Center [place] Score 7          LOCO LOWE - Registered Nurse

## (undated) DEVICE — ST EXT IV SMARTSITE 2VLV SP M LL 5ML IV1

## (undated) DEVICE — CANSTR COL ENGINE FOR INDIGO SYS

## (undated) DEVICE — NEURO GUIDEWIRE WITH HYDROPHILIC COATING: Brand: SYNCHRO 14

## (undated) DEVICE — STPCK 3/WY HP M/RA W/OFF/HNDL 1050PSI STRL

## (undated) DEVICE — Device

## (undated) DEVICE — RETRV STNT SOLITAIRE3 REVASCULARIZATION 4X40MM 180CM

## (undated) DEVICE — RADIFOCUS GLIDECATH: Brand: GLIDECATH

## (undated) DEVICE — ROTATING HEMOSTATIC VALVE .096": Brand: RHV

## (undated) DEVICE — LEX NEURO ANGIOGRAPHY: Brand: MEDLINE INDUSTRIES, INC.

## (undated) DEVICE — DEV COMP RAD PRELUDESYNC 24CM

## (undated) DEVICE — INTERMEDIATE CATHETER: Brand: AXS VECTA 71

## (undated) DEVICE — ST INF PRI SMRTSTE 20DRP 2VLV 24ML 117

## (undated) DEVICE — TBG CONN ASP PENUMBRA SYSTEM MAX .88IN

## (undated) DEVICE — CATH MIC PHENOM 27 .040IN 15CM

## (undated) DEVICE — CVR TRANSD FLX 3DIMEN 14X29.2CM LF STRL

## (undated) DEVICE — DEV TORQ H2OTORQ GW .025TO.040IN ORNG

## (undated) DEVICE — GLIDESHEATH BASIC HYDROPHILIC COATED INTRODUCER SHEATH: Brand: GLIDESHEATH

## (undated) DEVICE — STPCK LP 1WY RA 200PSI

## (undated) DEVICE — LIMB HOLDER, WRIST/ANKLE: Brand: DEROYAL

## (undated) DEVICE — R2P DESTINATION SLENDER GUIDING SHEATH: Brand: R2P DESTINATION SLENDER

## (undated) DEVICE — NDL ANGIOGR ADV THN SMOTH SGLWALL 21G 1.5